# Patient Record
Sex: FEMALE | Race: WHITE | NOT HISPANIC OR LATINO | Employment: OTHER | URBAN - METROPOLITAN AREA
[De-identification: names, ages, dates, MRNs, and addresses within clinical notes are randomized per-mention and may not be internally consistent; named-entity substitution may affect disease eponyms.]

---

## 2023-03-10 ENCOUNTER — LAB VISIT (OUTPATIENT)
Dept: LAB | Facility: HOSPITAL | Age: 78
End: 2023-03-10
Attending: INTERNAL MEDICINE
Payer: MEDICARE

## 2023-03-10 ENCOUNTER — OFFICE VISIT (OUTPATIENT)
Dept: PRIMARY CARE CLINIC | Facility: CLINIC | Age: 78
End: 2023-03-10
Payer: MEDICARE

## 2023-03-10 VITALS
DIASTOLIC BLOOD PRESSURE: 58 MMHG | SYSTOLIC BLOOD PRESSURE: 118 MMHG | HEIGHT: 66 IN | RESPIRATION RATE: 18 BRPM | OXYGEN SATURATION: 100 % | BODY MASS INDEX: 24.1 KG/M2 | HEART RATE: 99 BPM | TEMPERATURE: 98 F | WEIGHT: 149.94 LBS

## 2023-03-10 DIAGNOSIS — I10 HYPERTENSION, UNSPECIFIED TYPE: ICD-10-CM

## 2023-03-10 DIAGNOSIS — E03.9 HYPOTHYROIDISM, UNSPECIFIED TYPE: ICD-10-CM

## 2023-03-10 DIAGNOSIS — D69.2 SENILE PURPURA: Primary | ICD-10-CM

## 2023-03-10 DIAGNOSIS — I47.10 SVT (SUPRAVENTRICULAR TACHYCARDIA): ICD-10-CM

## 2023-03-10 DIAGNOSIS — E78.5 DYSLIPIDEMIA: ICD-10-CM

## 2023-03-10 DIAGNOSIS — L65.9 HAIR LOSS: ICD-10-CM

## 2023-03-10 LAB
ALBUMIN SERPL BCP-MCNC: 3.9 G/DL (ref 3.5–5.2)
ALP SERPL-CCNC: 83 U/L (ref 55–135)
ALT SERPL W/O P-5'-P-CCNC: 18 U/L (ref 10–44)
ANION GAP SERPL CALC-SCNC: 9 MMOL/L (ref 8–16)
AST SERPL-CCNC: 24 U/L (ref 10–40)
BASOPHILS # BLD AUTO: 0.06 K/UL (ref 0–0.2)
BASOPHILS NFR BLD: 0.8 % (ref 0–1.9)
BILIRUB SERPL-MCNC: 0.6 MG/DL (ref 0.1–1)
BUN SERPL-MCNC: 17 MG/DL (ref 8–23)
CALCIUM SERPL-MCNC: 9.6 MG/DL (ref 8.7–10.5)
CHLORIDE SERPL-SCNC: 109 MMOL/L (ref 95–110)
CHOLEST SERPL-MCNC: 148 MG/DL (ref 120–199)
CHOLEST/HDLC SERPL: 3.1 {RATIO} (ref 2–5)
CO2 SERPL-SCNC: 22 MMOL/L (ref 23–29)
CREAT SERPL-MCNC: 0.9 MG/DL (ref 0.5–1.4)
DIFFERENTIAL METHOD: ABNORMAL
EOSINOPHIL # BLD AUTO: 0.3 K/UL (ref 0–0.5)
EOSINOPHIL NFR BLD: 3.8 % (ref 0–8)
ERYTHROCYTE [DISTWIDTH] IN BLOOD BY AUTOMATED COUNT: 14.2 % (ref 11.5–14.5)
EST. GFR  (NO RACE VARIABLE): >60 ML/MIN/1.73 M^2
GLUCOSE SERPL-MCNC: 101 MG/DL (ref 70–110)
HCT VFR BLD AUTO: 43.3 % (ref 37–48.5)
HCV AB SERPL QL IA: NORMAL
HDLC SERPL-MCNC: 48 MG/DL (ref 40–75)
HDLC SERPL: 32.4 % (ref 20–50)
HGB BLD-MCNC: 13.4 G/DL (ref 12–16)
IMM GRANULOCYTES # BLD AUTO: 0.01 K/UL (ref 0–0.04)
IMM GRANULOCYTES NFR BLD AUTO: 0.1 % (ref 0–0.5)
LDLC SERPL CALC-MCNC: 82.4 MG/DL (ref 63–159)
LYMPHOCYTES # BLD AUTO: 2.2 K/UL (ref 1–4.8)
LYMPHOCYTES NFR BLD: 30.7 % (ref 18–48)
MCH RBC QN AUTO: 26 PG (ref 27–31)
MCHC RBC AUTO-ENTMCNC: 30.9 G/DL (ref 32–36)
MCV RBC AUTO: 84 FL (ref 82–98)
MONOCYTES # BLD AUTO: 0.6 K/UL (ref 0.3–1)
MONOCYTES NFR BLD: 8.1 % (ref 4–15)
NEUTROPHILS # BLD AUTO: 4.1 K/UL (ref 1.8–7.7)
NEUTROPHILS NFR BLD: 56.5 % (ref 38–73)
NONHDLC SERPL-MCNC: 100 MG/DL
NRBC BLD-RTO: 0 /100 WBC
PLATELET # BLD AUTO: 227 K/UL (ref 150–450)
PMV BLD AUTO: 12 FL (ref 9.2–12.9)
POTASSIUM SERPL-SCNC: 4.6 MMOL/L (ref 3.5–5.1)
PROT SERPL-MCNC: 6.8 G/DL (ref 6–8.4)
RBC # BLD AUTO: 5.16 M/UL (ref 4–5.4)
SODIUM SERPL-SCNC: 140 MMOL/L (ref 136–145)
T4 FREE SERPL-MCNC: 1.25 NG/DL (ref 0.71–1.51)
TRIGL SERPL-MCNC: 88 MG/DL (ref 30–150)
TSH SERPL DL<=0.005 MIU/L-ACNC: 1.18 UIU/ML (ref 0.4–4)
WBC # BLD AUTO: 7.19 K/UL (ref 3.9–12.7)

## 2023-03-10 PROCEDURE — 80053 COMPREHEN METABOLIC PANEL: CPT | Performed by: INTERNAL MEDICINE

## 2023-03-10 PROCEDURE — 99204 OFFICE O/P NEW MOD 45 MIN: CPT | Mod: S$PBB,,, | Performed by: INTERNAL MEDICINE

## 2023-03-10 PROCEDURE — 36415 COLL VENOUS BLD VENIPUNCTURE: CPT | Mod: PN | Performed by: INTERNAL MEDICINE

## 2023-03-10 PROCEDURE — 99999 PR PBB SHADOW E&M-NEW PATIENT-LVL III: ICD-10-PCS | Mod: PBBFAC,,, | Performed by: INTERNAL MEDICINE

## 2023-03-10 PROCEDURE — 85025 COMPLETE CBC W/AUTO DIFF WBC: CPT | Performed by: INTERNAL MEDICINE

## 2023-03-10 PROCEDURE — 80061 LIPID PANEL: CPT | Performed by: INTERNAL MEDICINE

## 2023-03-10 PROCEDURE — 99203 OFFICE O/P NEW LOW 30 MIN: CPT | Mod: PBBFAC,PN | Performed by: INTERNAL MEDICINE

## 2023-03-10 PROCEDURE — 99204 PR OFFICE/OUTPT VISIT, NEW, LEVL IV, 45-59 MIN: ICD-10-PCS | Mod: S$PBB,,, | Performed by: INTERNAL MEDICINE

## 2023-03-10 PROCEDURE — 84443 ASSAY THYROID STIM HORMONE: CPT | Performed by: INTERNAL MEDICINE

## 2023-03-10 PROCEDURE — 86803 HEPATITIS C AB TEST: CPT | Performed by: INTERNAL MEDICINE

## 2023-03-10 PROCEDURE — 84630 ASSAY OF ZINC: CPT | Performed by: INTERNAL MEDICINE

## 2023-03-10 PROCEDURE — 84439 ASSAY OF FREE THYROXINE: CPT | Performed by: INTERNAL MEDICINE

## 2023-03-10 PROCEDURE — 99999 PR PBB SHADOW E&M-NEW PATIENT-LVL III: CPT | Mod: PBBFAC,,, | Performed by: INTERNAL MEDICINE

## 2023-03-10 RX ORDER — LOSARTAN POTASSIUM 100 MG/1
100 TABLET ORAL EVERY MORNING
Qty: 90 TABLET | Refills: 1 | Status: SHIPPED | OUTPATIENT
Start: 2023-03-10 | End: 2023-09-07

## 2023-03-10 RX ORDER — LOSARTAN POTASSIUM 100 MG/1
100 TABLET ORAL EVERY MORNING
COMMUNITY
Start: 2022-12-20 | End: 2023-03-10 | Stop reason: SDUPTHER

## 2023-03-10 RX ORDER — METOPROLOL TARTRATE 100 MG/1
100 TABLET ORAL 2 TIMES DAILY
COMMUNITY
Start: 2023-01-28 | End: 2023-03-10 | Stop reason: SDUPTHER

## 2023-03-10 RX ORDER — METOPROLOL TARTRATE 100 MG/1
100 TABLET ORAL 2 TIMES DAILY
Qty: 180 TABLET | Refills: 1 | Status: SHIPPED | OUTPATIENT
Start: 2023-03-10 | End: 2023-10-27

## 2023-03-10 RX ORDER — NAPROXEN SODIUM 220 MG/1
81 TABLET, FILM COATED ORAL DAILY
COMMUNITY
End: 2024-01-24

## 2023-03-10 RX ORDER — ROSUVASTATIN CALCIUM 10 MG/1
10 TABLET, COATED ORAL
COMMUNITY
Start: 2022-12-20 | End: 2023-03-10 | Stop reason: SDUPTHER

## 2023-03-10 RX ORDER — LEVOTHYROXINE SODIUM 75 UG/1
75 TABLET ORAL EVERY MORNING
Qty: 90 TABLET | Refills: 1 | Status: SHIPPED | OUTPATIENT
Start: 2023-03-10 | End: 2023-09-29

## 2023-03-10 RX ORDER — LEVOTHYROXINE SODIUM 75 UG/1
75 TABLET ORAL EVERY MORNING
COMMUNITY
Start: 2023-01-07 | End: 2023-03-10 | Stop reason: SDUPTHER

## 2023-03-10 RX ORDER — ROSUVASTATIN CALCIUM 10 MG/1
10 TABLET, COATED ORAL DAILY
Qty: 90 TABLET | Refills: 1 | Status: SHIPPED | OUTPATIENT
Start: 2023-03-10 | End: 2023-09-03

## 2023-03-10 NOTE — PROGRESS NOTES
[facility] Progress Note  3/10/2023          Reason for Visit:      had concerns including Establish Care (Just moved [place] /Establishing care /Would like some refills on some medications/).       History of Present Illness:     Pt is here today to establish care after recently moving her from [facility].    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  Doesn't really want to take oral nsaids.  Discussed possibly trying topical voltaren gel and she was agreeable to try that.      SVT  Previously diagnosed.  On metoprolol 100 bid and has extra 25 mg tablets that she takes if she has episodes, which isn't often.  Has always resolved within just a few minutes with 1 or 2 doses of the 25 mg tablet.      Skin lesion face  There is a slightly scaly triangular fleshy colored area on the right cheek just below right eye.  It isn't painful or itchy.  Doesn't really look concerning for a skin cancer.  Suggested trying topical emmollient/moisturizer, considering derm follow up if not improving.    Hair loss  PT has had hair loss.    Has family history of hair loss in females.  Mom had to wear a wig  Pt does have hypothyroidism, thinks its been well controlled on current dose of meds  Agreeable to check labs including zinc, cbc today    Hypothyroidism  Diagnosed on routine labs.  Has been on stable dose for a while  Agreeable to recheck labs today    Senile purpura  Has scattered bruising on both forearms.    No significant bleeding.  Reassured about the senile purupura    HM  Last booster 6/22/22-discussed bivalent covid vaccine.  Has had pneumococcal vaccination previously - will request immunization records from [employer].  Has had shingles vaccine in past  Pt declined MMG/Dexa - doesn't want screening tests anymore    Problem List:     Patient Active Problem List   Diagnosis    Hypothyroidism    HTN  "(hypertension)    Dyslipidemia    SVT (supraventricular tachycardia)         Medications:       Current Outpatient Medications:     aspirin 81 MG Chew, Take 81 mg by mouth once daily., Disp: , Rfl:     levothyroxine (SYNTHROID) 75 MCG tablet, Take 1 tablet (75 mcg total) by mouth every morning., Disp: 90 tablet, Rfl: 1    losartan (COZAAR) 100 MG tablet, Take 1 tablet (100 mg total) by mouth every morning., Disp: 90 tablet, Rfl: 1    metoprolol tartrate (LOPRESSOR) 100 MG tablet, Take 1 tablet (100 mg total) by mouth 2 (two) times daily., Disp: 180 tablet, Rfl: 1    rosuvastatin (CRESTOR) 10 MG tablet, Take 1 tablet (10 mg total) by mouth once daily., Disp: 90 tablet, Rfl: 1        Review of Systems:     Review of Systems   Constitutional:  Negative for chills and fever.   HENT:  Negative for ear pain and sore throat.    Respiratory:  Negative for cough, shortness of breath and wheezing.    Cardiovascular:  Negative for chest pain and palpitations.   Gastrointestinal:  Negative for constipation, diarrhea, nausea and vomiting.   Genitourinary:  Negative for dysuria and hematuria.   Musculoskeletal:  Negative for joint swelling and joint deformity.   Neurological:  Negative for dizziness and weakness.         Physical Exam:     Temp:    97.9 °F (36.6 °C) (Oral)        Blood Pressure:  (!) 118/58        Pulse:   99     Respirations:  18  Weight:   68 kg (149 lb 14.6 oz)  Height:   5' 6" (1.676 m)  BMI:     Body mass index is 24.2 kg/m².    Physical Exam  Constitutional:       General: She is not in acute distress.  HENT:      Right Ear: Tympanic membrane normal.      Left Ear: Tympanic membrane normal.      Nose: No congestion or rhinorrhea.      Mouth/Throat:      Pharynx: No oropharyngeal exudate or posterior oropharyngeal erythema.   Cardiovascular:      Rate and Rhythm: Normal rate and regular rhythm.   Pulmonary:      Effort: Pulmonary effort is normal. No respiratory distress.      Breath sounds: No wheezing. "   Abdominal:      Palpations: Abdomen is soft.      Tenderness: There is no abdominal tenderness.   Skin:     General: Skin is warm.   Neurological:      General: No focal deficit present.      Mental Status: She is alert and oriented to person, place, and time.         Labs/Imaging/Testing:     Most recent CBC:  Lab Results   Component Value Date    WBC 7.19 03/10/2023    HGB 13.4 03/10/2023     03/10/2023    MCV 84 03/10/2023       Most recent Lipids:  Lab Results   Component Value Date    CHOL 148 03/10/2023    HDL 48 03/10/2023    LDLCALC 82.4 03/10/2023    TRIG 88 03/10/2023       Most recent Chemistry:  Lab Results   Component Value Date     03/10/2023    K 4.6 03/10/2023     03/10/2023    CO2 22 (L) 03/10/2023    BUN 17 03/10/2023    CREATININE 0.9 03/10/2023     03/10/2023    CALCIUM 9.6 03/10/2023    ALT 18 03/10/2023    AST 24 03/10/2023    ALKPHOS 83 03/10/2023    PROT 6.8 03/10/2023    ALBUMIN 3.9 03/10/2023       Other tests:  Lab Results   Component Value Date    TSH 1.183 03/10/2023    FREET4 1.25 03/10/2023             Assessment and Plan:     1. SVT (supraventricular tachycardia)  Continue metoprolol, stable    2. Hypothyroidism, unspecified type  Recheck labs including thyroid fucntions, continue levothyroxine 75 mcg      3. Hypertension, unspecified type  Cotninue losartan 100 and metoprolol 100 bid  Stable    4. Dyslipidemia  Recheck FLP    5. Senile purpura  Reassured today    6. Hair loss  - CBC Auto Differential; Future  - Comprehensive Metabolic Panel; Future  - Lipid Panel; Future  - TSH; Future  - Hepatitis C Antibody; Future  - ZINC; Future  - T4, Free; Future     RTC 6 mos or sooner honorio Hernandez MD  3/10/2023    This note was prepared using voice-recognition software.  Although efforts are made to proofread the note, some errors may persist in the final document.

## 2023-03-13 ENCOUNTER — TELEPHONE (OUTPATIENT)
Dept: PRIMARY CARE CLINIC | Facility: CLINIC | Age: 78
End: 2023-03-13
Payer: MEDICARE

## 2023-03-13 NOTE — TELEPHONE ENCOUNTER
----- Message from Simon Hernandez MD sent at 3/13/2023 12:56 PM CDT -----  Please advise - the labs looked ok - no new recommendations at this time (the zinc test is still pending).

## 2023-03-14 LAB — ZINC SERPL-MCNC: 61 UG/DL (ref 60–130)

## 2023-09-02 NOTE — TELEPHONE ENCOUNTER
No care due was identified.  Health [facility] Embedded Care Due Messages. Reference number: [id number].   9/02/2023 7:46:28 AM CDT

## 2023-09-03 RX ORDER — ROSUVASTATIN CALCIUM 10 MG/1
10 TABLET, COATED ORAL
Qty: 90 TABLET | Refills: 1 | Status: SHIPPED | OUTPATIENT
Start: 2023-09-03 | End: 2023-12-05 | Stop reason: SDUPTHER

## 2023-09-03 NOTE — TELEPHONE ENCOUNTER
Refill Authorization Note     Refill Decision Note   Anette Willis  is requesting a refill authorization.  Brief Assessment and Rationale for Refill:  Approve     Medication Therapy Plan:       Medication Reconciliation Completed: No   Comments:     No Care Gaps recommended.     Note composed:10:55 AM 09/03/2023

## 2023-09-07 RX ORDER — LOSARTAN POTASSIUM 100 MG/1
100 TABLET ORAL
Qty: 90 TABLET | Refills: 1 | Status: SHIPPED | OUTPATIENT
Start: 2023-09-07 | End: 2023-12-05 | Stop reason: SDUPTHER

## 2023-09-07 NOTE — TELEPHONE ENCOUNTER
Refill Decision Note   Anette Lazaro  is requesting a refill authorization.  Brief Assessment and Rationale for Refill:  Approve     Medication Therapy Plan:         Comments:     Note composed:12:53 PM 09/07/2023

## 2023-09-07 NOTE — TELEPHONE ENCOUNTER
No care due was identified.  Health [facility] Embedded Care Due Messages. Reference number: [id number].   9/07/2023 12:41:41 AM CDT

## 2023-09-19 RX ORDER — METOPROLOL TARTRATE 25 MG/1
25 TABLET, FILM COATED ORAL 2 TIMES DAILY
Qty: 180 TABLET | Refills: 3 | OUTPATIENT
Start: 2023-09-19

## 2023-09-19 NOTE — TELEPHONE ENCOUNTER
No care due was identified.  Health [facility] Embedded Care Due Messages. Reference number: [id number].   9/19/2023 12:47:50 AM CDT

## 2023-09-19 NOTE — TELEPHONE ENCOUNTER
Refill Decision Note   Anette Willis  is requesting a refill authorization.  Brief Assessment and Rationale for Refill:  Quick Discontinue     Medication Therapy Plan:  PATIENT WAS INCREASED TO 100MG ON 01/28/23    Medication Reconciliation Completed: No   Comments:     No Care Gaps recommended.     Note composed:12:23 PM 09/19/2023

## 2023-09-20 DIAGNOSIS — Z78.0 MENOPAUSE: ICD-10-CM

## 2023-09-29 RX ORDER — LEVOTHYROXINE SODIUM 75 UG/1
75 TABLET ORAL EVERY MORNING
Qty: 90 TABLET | Refills: 1 | Status: SHIPPED | OUTPATIENT
Start: 2023-09-29 | End: 2023-12-05 | Stop reason: SDUPTHER

## 2023-09-29 NOTE — TELEPHONE ENCOUNTER
No care due was identified.  Health [facility] Embedded Care Due Messages. Reference number: [id number].   9/29/2023 12:41:50 AM CDT

## 2023-09-29 NOTE — TELEPHONE ENCOUNTER
Refill Decision Note   Anette Willis  is requesting a refill authorization.  Brief Assessment and Rationale for Refill:  Approve     Medication Therapy Plan:       Medication Reconciliation Completed: No    Comments:     No Care Gaps recommended.     Note composed:12:11 PM 09/29/2023

## 2023-10-27 RX ORDER — METOPROLOL TARTRATE 100 MG/1
100 TABLET ORAL 2 TIMES DAILY
Qty: 180 TABLET | Refills: 1 | Status: SHIPPED | OUTPATIENT
Start: 2023-10-27 | End: 2023-12-05 | Stop reason: SDUPTHER

## 2023-10-27 NOTE — TELEPHONE ENCOUNTER
No care due was identified.  Health [facility] Embedded Care Due Messages. Reference number: [id number].   10/27/2023 1:39:19 AM CDT

## 2023-10-27 NOTE — TELEPHONE ENCOUNTER
Refill Decision Note   Anette Willis  is requesting a refill authorization.  Brief Assessment and Rationale for Refill:  Approve     Medication Therapy Plan:       Medication Reconciliation Completed: No   Comments:     No Care Gaps recommended.     Note composed:10:04 AM 10/27/2023

## 2023-10-31 ENCOUNTER — OFFICE VISIT (OUTPATIENT)
Dept: DERMATOLOGY | Facility: CLINIC | Age: 78
End: 2023-10-31
Payer: MEDICARE

## 2023-10-31 VITALS — WEIGHT: 149 LBS | BODY MASS INDEX: 24.05 KG/M2

## 2023-10-31 DIAGNOSIS — D18.01 CHERRY ANGIOMA: ICD-10-CM

## 2023-10-31 DIAGNOSIS — L82.1 SEBORRHEIC KERATOSES: Primary | ICD-10-CM

## 2023-10-31 DIAGNOSIS — L81.1 MELASMA: ICD-10-CM

## 2023-10-31 DIAGNOSIS — Z12.83 SKIN EXAM, SCREENING FOR CANCER: ICD-10-CM

## 2023-10-31 DIAGNOSIS — L81.4 LENTIGINES: ICD-10-CM

## 2023-10-31 PROCEDURE — 99203 PR OFFICE/OUTPT VISIT, NEW, LEVL III, 30-44 MIN: ICD-10-PCS | Mod: S$PBB,,, | Performed by: DERMATOLOGY

## 2023-10-31 PROCEDURE — 99999 PR PBB SHADOW E&M-EST. PATIENT-LVL III: CPT | Mod: PBBFAC,,, | Performed by: DERMATOLOGY

## 2023-10-31 PROCEDURE — 99203 OFFICE O/P NEW LOW 30 MIN: CPT | Mod: S$PBB,,, | Performed by: DERMATOLOGY

## 2023-10-31 PROCEDURE — 99213 OFFICE O/P EST LOW 20 MIN: CPT | Mod: PBBFAC,PO | Performed by: DERMATOLOGY

## 2023-10-31 PROCEDURE — 99999 PR PBB SHADOW E&M-EST. PATIENT-LVL III: ICD-10-PCS | Mod: PBBFAC,,, | Performed by: DERMATOLOGY

## 2023-10-31 NOTE — PROGRESS NOTES
Subjective:      Patient ID:  Anette Willis is a 78 y.o. female who presents for   Chief Complaint   Patient presents with    Skin Check     Recently moved here from [place] no hx of skin cancers, would like skin check.  Has problems with dry skin on finger tips.       Review of Systems   Constitutional:  Negative for fever, chills, weight loss, weight gain, fatigue, night sweats and malaise.   Skin:  Positive for daily sunscreen use and wears hat. Negative for activity-related sunscreen use.   Hematologic/Lymphatic: Does not bruise/bleed easily.       Objective:   Physical Exam   Constitutional: She appears well-developed and well-nourished. No distress.   Neurological: She is alert and oriented to person, place, and time. She is not disoriented.   Psychiatric: She has a normal mood and affect.   Skin:   Areas Examined (abnormalities noted in diagram):   Head / Face Inspection Performed  Neck Inspection Performed  Chest / Axilla Inspection Performed  Abdomen Inspection Performed  Back Inspection Performed  RUE Inspected  LUE Inspection Performed  RLE Inspected  LLE Inspection Performed  Nails and Digits Inspection Performed                     Diagram Legend     Erythematous scaling macule/papule c/w actinic keratosis       Vascular papule c/w angioma      Pigmented verrucoid papule/plaque c/w seborrheic keratosis      Yellow umbilicated papule c/w sebaceous hyperplasia      Irregularly shaped tan macule c/w lentigo     1-2 mm smooth white papules consistent with Milia      Movable subcutaneous cyst with punctum c/w epidermal inclusion cyst      Subcutaneous movable cyst c/w pilar cyst      Firm pink to brown papule c/w dermatofibroma      Pedunculated fleshy papule(s) c/w skin tag(s)      Evenly pigmented macule c/w junctional nevus     Mildly variegated pigmented, slightly irregular-bordered macule c/w mildly atypical nevus      Flesh colored to evenly pigmented papule c/w intradermal nevus       Pink pearly  "papule/plaque c/w basal cell carcinoma      Erythematous hyperkeratotic cursted plaque c/w SCC      Surgical scar with no sign of skin cancer recurrence      Open and closed comedones      Inflammatory papules and pustules      Verrucoid papule consistent consistent with wart     Erythematous eczematous patches and plaques     Dystrophic onycholytic nail with subungual debris c/w onychomycosis     Umbilicated papule    Erythematous-base heme-crusted tan verrucoid plaque consistent with inflamed seborrheic keratosis     Erythematous Silvery Scaling Plaque c/w Psoriasis     See annotation      Assessment / Plan:        Seborrheic keratoses  Seborrheic keratosis scattered, told benign no treatment needed.  Brochure provided.  Can try cerave renewing cream for lesions on face      Cherry angiomas  This is a benign vascular lesion. Reassurance given. No treatment required.       Lentigines  The "ABCD" rules to observe pigmented lesions were reviewed.      Melasma  sunscreen    Skin exam, screening for cancer   No lesions suspicious for malignancy noted.    Recommend daily sun protection/avoidance and use of at least SPF 30, broad spectrum sunscreen (OTC drug).     Dermatitis fingers  Aveeno moisturizer after hand washing           Follow up in about 1 year (around 10/31/2024).  "

## 2023-11-14 ENCOUNTER — OFFICE VISIT (OUTPATIENT)
Dept: INTERNAL MEDICINE | Facility: CLINIC | Age: 78
End: 2023-11-14
Payer: MEDICARE

## 2023-11-14 DIAGNOSIS — M79.605 LEFT LEG PAIN: Primary | ICD-10-CM

## 2023-11-14 PROCEDURE — 99499 NO LOS: ICD-10-PCS | Mod: 95,,, | Performed by: PHYSICIAN ASSISTANT

## 2023-11-14 PROCEDURE — 99499 UNLISTED E&M SERVICE: CPT | Mod: 95,,, | Performed by: PHYSICIAN ASSISTANT

## 2023-11-14 NOTE — PROGRESS NOTES
Pt re-directed to UC - c/o not appropriate for audio call only. -ACM   Answers submitted by the patient for this visit:  Back Pain Questionnaire (Submitted on 11/14/2023)  Chief Complaint: Back pain  Chronicity: new  Onset: in the past 7 days  Frequency: daily  Progression since onset: waxing and waning  Pain location: sacro-iliac  Pain quality: stabbing  Radiates to: right foot, right knee, right thigh  Pain - numeric: 8/10  Pain is: worse during the day  Aggravated by: position, standing  Stiffness is present: all day  leg pain: Yes  weakness: Yes  Risk factors: lack of exercise  Pain severity: moderate  Improvement on treatment: mild

## 2023-11-15 ENCOUNTER — OFFICE VISIT (OUTPATIENT)
Dept: URGENT CARE | Facility: CLINIC | Age: 78
End: 2023-11-15
Payer: MEDICARE

## 2023-11-15 VITALS
RESPIRATION RATE: 16 BRPM | WEIGHT: 149.06 LBS | SYSTOLIC BLOOD PRESSURE: 163 MMHG | TEMPERATURE: 98 F | OXYGEN SATURATION: 98 % | HEART RATE: 77 BPM | HEIGHT: 66 IN | DIASTOLIC BLOOD PRESSURE: 84 MMHG | BODY MASS INDEX: 23.95 KG/M2

## 2023-11-15 DIAGNOSIS — M54.41 ACUTE RIGHT-SIDED LOW BACK PAIN WITH RIGHT-SIDED SCIATICA: Primary | ICD-10-CM

## 2023-11-15 DIAGNOSIS — M62.838 MUSCLE SPASM: ICD-10-CM

## 2023-11-15 PROCEDURE — 99214 PR OFFICE/OUTPT VISIT, EST, LEVL IV, 30-39 MIN: ICD-10-PCS | Mod: S$GLB,,, | Performed by: PHYSICIAN ASSISTANT

## 2023-11-15 PROCEDURE — 99214 OFFICE O/P EST MOD 30 MIN: CPT | Mod: S$GLB,,, | Performed by: PHYSICIAN ASSISTANT

## 2023-11-15 RX ORDER — METOPROLOL TARTRATE 25 MG/1
25 TABLET, FILM COATED ORAL 2 TIMES DAILY
COMMUNITY
Start: 2023-06-16 | End: 2023-12-05 | Stop reason: SDUPTHER

## 2023-11-15 RX ORDER — MELOXICAM 7.5 MG/1
7.5 TABLET ORAL DAILY PRN
Qty: 30 TABLET | Refills: 0 | Status: SHIPPED | OUTPATIENT
Start: 2023-11-15 | End: 2023-11-15 | Stop reason: SDUPTHER

## 2023-11-15 RX ORDER — METHOCARBAMOL 500 MG/1
500 TABLET, FILM COATED ORAL EVERY 12 HOURS PRN
Qty: 30 TABLET | Refills: 0 | Status: SHIPPED | OUTPATIENT
Start: 2023-11-15 | End: 2023-11-25

## 2023-11-15 RX ORDER — GABAPENTIN 300 MG/1
CAPSULE ORAL
Qty: 59 CAPSULE | Refills: 0 | Status: SHIPPED | OUTPATIENT
Start: 2023-11-15 | End: 2023-12-05

## 2023-11-15 RX ORDER — MELOXICAM 7.5 MG/1
7.5 TABLET ORAL DAILY PRN
Qty: 30 TABLET | Refills: 0 | Status: SHIPPED | OUTPATIENT
Start: 2023-11-15 | End: 2023-12-05

## 2023-11-15 RX ORDER — GABAPENTIN 300 MG/1
CAPSULE ORAL
Qty: 59 CAPSULE | Refills: 0 | Status: SHIPPED | OUTPATIENT
Start: 2023-11-15 | End: 2023-11-15 | Stop reason: SDUPTHER

## 2023-11-15 RX ORDER — METHOCARBAMOL 500 MG/1
500 TABLET, FILM COATED ORAL EVERY 12 HOURS PRN
Qty: 30 TABLET | Refills: 0 | Status: SHIPPED | OUTPATIENT
Start: 2023-11-15 | End: 2023-11-15 | Stop reason: SDUPTHER

## 2023-11-15 NOTE — PATIENT INSTRUCTIONS
PLEASE READ YOUR DISCHARGE INSTRUCTIONS ENTIRELY AS IT CONTAINS IMPORTANT INFORMATION.  - OTC Tylenol/anti-inflammatory as needed for pain (see below). These medications can be obtained over the counter at any local pharmacy without requiring a prescription.   OTC ORAL medications for pain reliever or fever reducing:  - Acetaminophen (tylenol 650mg every 8 hour as needed with food) or Ibuprofen (advil,motrin 400-600mg every 8 hour with food as needed) as directed as needed for fever/pain. Avoid tylenol if you have a history of liver disease or allergic reactions. Do not take ibuprofen if you have a history of allergic reactions, stomach bleeding ulcers, kidney disease, or if you take blood thinners.  -The patient was advised that NSAID-type medications have two very important potential side effects: gastrointestinal irritation including hemorrhage and renal injuries. patient was asked to take the medication with food and to stop if patient experiences any GI upset. I asked patient to call for vomiting, abdominal pain or black/bloody stools. The patient expresses understanding of these issues and all questions were answered.  OTC TOPICAL meds for pain reliever :  -You can apply OTC diclofenac or Volatren Gel 2-3 times daily to muscle or joints.  -You can also apply OTC lidocaine 4-5% with OR without menthol ointment 2-3 daily to muscle or joints.  -Please let one absorb before using the other. Do not use both at the same time as it may decrease efficacy. Please stop using if you develop any skin rash or irritation.  -Please wash your hands after application of these topical products.     - do not use OTC anti-inflammatory if taking prescribed (Rx--meloxicam) anti-inflammatory. Start Rx inflammatory in 6-12 hours from clinic visit because you were given a concentrated anti-inflammatory injection in clinic.  - no operating machinery with gabapentin or muscle relaxant as it may cause drowsiness.  Take 300mg (1cap) every  evening x3days. Then, increase to 300mg (1cap) twice daily x3days. May titrate up to 300mg three times daily as tolerated. Hold/stop taking if you develop any daytime drowsiness, vision changes, or leg swelling.  - continue heat (muscle) /ice (bone/joint) compression, rice therapy, and muscle stretches.   - Radiographs and all diagnostic testing reviewed with patient  - if no improvement or worsening symptoms, recommend follow-up with *orthopedics or PCP for further evaluation.  Please call the number below to set up appointment; if a referral has been placed.  - Follow up with your PCP or specialty clinic as directed.  You can call [phone] or [phone] to schedule an appointment with the appropriate provider.    - If you smoke, please stop smoking.  - discussed weight loss given obesity  -You must understand that you've received an Urgent Care treatment only and that you may be released before all your medical problems are known or treated. You, the patient, will arrange for follow up care as instructed. Please arrange follow up with your primary medical clinic within 2-5 days if your signs and symptoms have not resolved or worsen.   - If your condition worsens or fails to improve we recommend that you receive another evaluation at the emergency room immediately or contact your primary medical clinic to discuss your concerns in next 2-5 days.  Strict ER versus clinic precautions given.      RED FLAGS/WARNING SYMPTOMS DISCUSSED WITH PATIENT THAT WOULD WARRANT EMERGENT MEDICAL ATTENTION. Patient aware and verbalized understanding.      RICE     Rest an injury, elevate it, and use ice and compression as directed.   RICE stands for rest, ice, compression, and elevation. These can limit pain and swelling after an injury. RICE may be recommended to help treat fractures, sprains, strains, and bruises or bumps.   Home care  The following explain the details of RICE:  Rest. Limit the use of the injured body part.  This helps prevent further damage to the body part and gives it time to heal. In some cases, you may need a sling, brace, splint, or cast to help keep the body part still until it has healed.  Ice. Applying ice right after an injury helps relieve pain and swelling. Wrap a bag of ice in a thin towel. Then, place it over the injured area. Do this for 10 to 15 minutes every 3 to 4 hours. Continue for the next 1 to 3 days or until your symptoms improve. Never put ice directly on your skin or ice an area longer than 15 minutes at a time.  Compression. Putting pressure on an injury helps reduce swelling and provides support. Wrap the injured area firmly with an elastic bandage/wrap. Make sure not to wrap the bandage too tightly or you will cut off blood flow to the injured area. If your bandage loosens, rewrap it.  Elevation. Keeping an injury raised above the level of your heart reduces swelling, pain, and throbbing. For instance, if you have a broken leg, it may help to rest your leg on several pillows when sitting or lying down. Try to keep the injured area elevated for at least 2 to 3 hours per day.  Follow-up care  Follow up with your healthcare provider, or as advised.  When to seek medical advice  Call your healthcare provider right away if any of these occur:  Fever of 100.4°F (38°C) or higher, or as directed by your healthcare provider  Increased pain or swelling in the injured body part  Injured body part becomes cold, blue, numb, or tingly  Signs of infection. These include warmth in the skin, redness, drainage, or bad smell coming from the injured body part.  Date Last Reviewed: 1/18/2016  © 9814-1645 [employer]. [address] [place] [address], [place] [address]. All rights reserved. This information is not intended as a substitute for professional medical care. Always follow your healthcare professional's instructions.

## 2023-11-15 NOTE — PROGRESS NOTES
"Subjective:      Patient ID: Anette Willis is a 78 y.o. female.    Vitals:  height is 5' 6" (1.676 m) and weight is 67.6 kg (149 lb 0.5 oz). Her oral temperature is 98.3 °F (36.8 °C). Her blood pressure is 163/84 (abnormal) and her pulse is 77. Her respiration is 16 and oxygen saturation is 98%.     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  Complains symptoms started 1 week ago after she was stretching.  Complaining new right buttocks pain radiating to right posterior lateral leg until ankle.  Pain worsens at night when she is lying down or increased physical activity.  Pain improves with bending over while walking.  Has tried OTC Tylenol, leave, icy Hot, ice pack, and warm compresses with no significant relief.  Has associated muscle spasms and legs at night.  Using support cane since pain started as she feels her right leg is weak when pain is severe.  No other associated symptoms.  No fever, rash, recent hospitalization, surgery, immobilization, malignancy, urinary symptoms, bladder bowel incontinence, or saddle anesthesia.    Leg Pain   The incident occurred more than 1 week ago. The incident occurred at home. Injury mechanism: possible stretching. The pain is present in the right foot, right ankle, right thigh, right hip, right knee and right leg. The quality of the pain is described as aching, cramping and burning. The pain is at a severity of 6/10. The pain is moderate. The pain has been Intermittent since onset. Associated symptoms include muscle weakness. Pertinent negatives include no inability to bear weight, loss of motion, loss of sensation, numbness or tingling. She reports no foreign bodies present. Nothing aggravates the symptoms. She has tried acetaminophen, heat, ice, NSAIDs, immobilization and rest (Aleve) for the " symptoms. The treatment provided mild relief.       Constitution: Negative for activity change, appetite change, chills, sweating, fatigue, fever and generalized weakness.   HENT:  Negative for ear pain, hearing loss, facial swelling, congestion, postnasal drip, sinus pain, sinus pressure, sore throat, trouble swallowing and voice change.    Neck: Negative for neck pain, neck stiffness and painful lymph nodes.   Cardiovascular:  Negative for chest pain, leg swelling, palpitations, sob on exertion and passing out.   Eyes:  Negative for eye discharge, eye pain, photophobia, vision loss, double vision and blurred vision.   Respiratory:  Negative for chest tightness, cough, sputum production, bloody sputum, COPD, shortness of breath, stridor, wheezing and asthma.    Gastrointestinal:  Negative for abdominal pain, nausea, vomiting, constipation, diarrhea, bright red blood in stool, rectal bleeding, heartburn and bowel incontinence.   Genitourinary:  Negative for dysuria, frequency, urgency, urine decreased, flank pain, bladder incontinence and hematuria.   Musculoskeletal:  Positive for back pain and muscle ache. Negative for trauma, joint pain, joint swelling, abnormal ROM of joint, muscle cramps and history of spine disorder.   Skin:  Negative for color change, pale, rash and wound.   Allergic/Immunologic: Negative for seasonal allergies, asthma and immunocompromised state.   Neurological:  Negative for dizziness, history of vertigo, light-headedness, passing out, facial drooping, speech difficulty, coordination disturbances, loss of balance, headaches, disorientation, altered mental status, loss of consciousness, numbness, tingling and seizures.   Hematologic/Lymphatic: Negative for swollen lymph nodes, easy bruising/bleeding and trouble clotting. Does not bruise/bleed easily.   Psychiatric/Behavioral:  Negative for altered mental status and disorientation.       Objective:     Physical Exam   Constitutional: She  appears well-developed. She is cooperative.  Non-toxic appearance. She does not appear ill. No distress.   HENT:   Head: Normocephalic and atraumatic.   Ears:   Right Ear: Hearing, external ear and ear canal normal.   Left Ear: Hearing, external ear and ear canal normal.   Nose: Nose normal.   Mouth/Throat: Oropharynx is clear and moist. Mucous membranes are moist. Oropharynx is clear.   Eyes: Conjunctivae, EOM and lids are normal. Right eye exhibits no discharge. Left eye exhibits no discharge. vision grossly intact gaze aligned appropriately   Neck: Neck supple. No neck rigidity present.   Cardiovascular: Normal rate, regular rhythm and normal pulses.      Comments: No leg edema, calf tenderness/erythema, or Homans sign bilaterally.       Pulmonary/Chest: Effort normal. No accessory muscle usage. No respiratory distress. She has no wheezes. She exhibits no tenderness.   Abdominal: Normal appearance. She exhibits no distension. There is no abdominal tenderness. There is no rebound, no guarding, no left CVA tenderness and no right CVA tenderness.   Musculoskeletal: Normal range of motion.         General: No tenderness. Normal range of motion.      Right lower leg: No edema.      Left lower leg: No edema.      Comments: Spinal exam:   Moves all extremities with good strength 5/5  BUE- deltoid, triceps, biceps, wrist ext/flexion, hand , and interossei  BLE- hip flexion, knee ext/flexion, dorsiflexion, plantar flexion, EHL, ankle inversion, and ankle eversion      Standing and sitting posture normal.  Gait antalgic.      Negative straight leg raise   Negative clonus   Negative Pam's bilaterally.    Sensation intact to light touch throughout.  2+ DTR bilaterally.    Full back ROM; no pain with all ROM  Full neck ROM; no pain with all ROM.    Negative Lhermitte's or Spurling's    There is no tenderness to palpation of midline spine.  There is no bony step-off, crepitus, or bony tenderness to palpation.       There is muscle spasms present.     No tenderness to palpation of bilateral SI joint   No pain on hip ROM. No TTP trochanteric bursa.  Enoc's test negative         Neurological: no focal deficit. She is alert and at baseline. She has normal motor skills and normal sensation. She displays no weakness, facial symmetry and normal reflexes. No cranial nerve deficit or sensory deficit. She exhibits normal muscle tone. Gait and coordination normal. Coordination normal.   Skin: Skin is warm, dry, not diaphoretic and no rash. Capillary refill takes less than 2 seconds.        Psychiatric: Her behavior is normal. Mood and thought content normal.   Nursing note and vitals reviewed.      Assessment:     1. Acute right-sided low back pain with right-sided sciatica    2. Muscle spasm      Note dictated with voice recognition software, please excuse any grammatical errors.    Patient presents with clinical exam findings and history consistent with above.  We discussed the differential diagnosis.    On exam, patient is nontoxic appearing and vitals are stable.  Patient is essentially neurovascularly intact on exam.      Diagnostic testing results were independently reviewed and interpreted, which were discussed in depth with patient.   Test ordered in clinic:  None  Additionally, previous progress notes/admissions/lab were reviewed and interpreted.  CMP 03/10/2023 with Good kidney function and EGFR.  PCP progress note 03/10/2023 reviewed.      Plan:     Offered outpatient referral for physical therapy but patient declined.  Patient was prescribed meds and recommended OTC treatments for their symptoms. Patient was treated conservatively with activity modification, OTC pain reliever, muscle stretches, and Warm vs. RICE therapy.   Patient was referred to orthopedics/back and spine clinic for evaluation or if they fail conservative treatment.   If symptoms do not improve/worsens, patient was referred back to PCP for continued  outpatient workup and management.       Acute right-sided low back pain with right-sided sciatica  -     Ambulatory referral/consult to Back & Spine Clinic  -     Discontinue: meloxicam (MOBIC) 7.5 MG tablet; Take 1 tablet (7.5 mg total) by mouth daily as needed for Pain (Take with food in AM).  Dispense: 30 tablet; Refill: 0  -     Discontinue: methocarbamoL (ROBAXIN) 500 MG Tab; Take 1 tablet (500 mg total) by mouth every 12 (twelve) hours as needed (Muscle spasm).  Dispense: 30 tablet; Refill: 0  -     Discontinue: gabapentin (NEURONTIN) 300 MG capsule; Take 1 capsule (300 mg total) by mouth every evening for 3 days, THEN 1 capsule (300 mg total) every 12 (twelve) hours.  Dispense: 59 capsule; Refill: 0  -     gabapentin (NEURONTIN) 300 MG capsule; Take 1 capsule (300 mg total) by mouth every evening for 3 days, THEN 1 capsule (300 mg total) every 12 (twelve) hours.  Dispense: 59 capsule; Refill: 0  -     meloxicam (MOBIC) 7.5 MG tablet; Take 1 tablet (7.5 mg total) by mouth daily as needed for Pain (Take with food in AM).  Dispense: 30 tablet; Refill: 0  -     methocarbamoL (ROBAXIN) 500 MG Tab; Take 1 tablet (500 mg total) by mouth every 12 (twelve) hours as needed (Muscle spasm).  Dispense: 30 tablet; Refill: 0    Muscle spasm  -     Ambulatory referral/consult to Back & Spine Clinic  -     Discontinue: meloxicam (MOBIC) 7.5 MG tablet; Take 1 tablet (7.5 mg total) by mouth daily as needed for Pain (Take with food in AM).  Dispense: 30 tablet; Refill: 0  -     Discontinue: methocarbamoL (ROBAXIN) 500 MG Tab; Take 1 tablet (500 mg total) by mouth every 12 (twelve) hours as needed (Muscle spasm).  Dispense: 30 tablet; Refill: 0  -     Discontinue: gabapentin (NEURONTIN) 300 MG capsule; Take 1 capsule (300 mg total) by mouth every evening for 3 days, THEN 1 capsule (300 mg total) every 12 (twelve) hours.  Dispense: 59 capsule; Refill: 0  -     gabapentin (NEURONTIN) 300 MG capsule; Take 1 capsule (300 mg total) by  mouth every evening for 3 days, THEN 1 capsule (300 mg total) every 12 (twelve) hours.  Dispense: 59 capsule; Refill: 0  -     meloxicam (MOBIC) 7.5 MG tablet; Take 1 tablet (7.5 mg total) by mouth daily as needed for Pain (Take with food in AM).  Dispense: 30 tablet; Refill: 0  -     methocarbamoL (ROBAXIN) 500 MG Tab; Take 1 tablet (500 mg total) by mouth every 12 (twelve) hours as needed (Muscle spasm).  Dispense: 30 tablet; Refill: 0             Additional MDM:     Heart Failure Score:   COPD = No          Patient Instructions   PLEASE READ YOUR DISCHARGE INSTRUCTIONS ENTIRELY AS IT CONTAINS IMPORTANT INFORMATION.  - OTC Tylenol/anti-inflammatory as needed for pain (see below). These medications can be obtained over the counter at any local pharmacy without requiring a prescription.   OTC ORAL medications for pain reliever or fever reducing:  - Acetaminophen (tylenol 650mg every 8 hour as needed with food) or Ibuprofen (advil,motrin 400-600mg every 8 hour with food as needed) as directed as needed for fever/pain. Avoid tylenol if you have a history of liver disease or allergic reactions. Do not take ibuprofen if you have a history of allergic reactions, stomach bleeding ulcers, kidney disease, or if you take blood thinners.  -The patient was advised that NSAID-type medications have two very important potential side effects: gastrointestinal irritation including hemorrhage and renal injuries. patient was asked to take the medication with food and to stop if patient experiences any GI upset. I asked patient to call for vomiting, abdominal pain or black/bloody stools. The patient expresses understanding of these issues and all questions were answered.  OTC TOPICAL meds for pain reliever :  -You can apply OTC diclofenac or Volatren Gel 2-3 times daily to muscle or joints.  -You can also apply OTC lidocaine 4-5% with OR without menthol ointment 2-3 daily to muscle or joints.  -Please let one absorb before using the  other. Do not use both at the same time as it may decrease efficacy. Please stop using if you develop any skin rash or irritation.  -Please wash your hands after application of these topical products.     - do not use OTC anti-inflammatory if taking prescribed (Rx--meloxicam) anti-inflammatory. Start Rx inflammatory in 6-12 hours from clinic visit because you were given a concentrated anti-inflammatory injection in clinic.  - no operating machinery with gabapentin or muscle relaxant as it may cause drowsiness.  Take 300mg (1cap) every evening x3days. Then, increase to 300mg (1cap) twice daily x3days. May titrate up to 300mg three times daily as tolerated. Hold/stop taking if you develop any daytime drowsiness, vision changes, or leg swelling.  - continue heat (muscle) /ice (bone/joint) compression, rice therapy, and muscle stretches.   - Radiographs and all diagnostic testing reviewed with patient  - if no improvement or worsening symptoms, recommend follow-up with *orthopedics or PCP for further evaluation.  Please call the number below to set up appointment; if a referral has been placed.  - Follow up with your PCP or specialty clinic as directed.  You can call [phone] or [phone] to schedule an appointment with the appropriate provider.    - If you smoke, please stop smoking.  - discussed weight loss given obesity  -You must understand that you've received an Urgent Care treatment only and that you may be released before all your medical problems are known or treated. You, the patient, will arrange for follow up care as instructed. Please arrange follow up with your primary medical clinic within 2-5 days if your signs and symptoms have not resolved or worsen.   - If your condition worsens or fails to improve we recommend that you receive another evaluation at the emergency room immediately or contact your primary medical clinic to discuss your concerns in next 2-5 days.  Strict ER versus clinic  precautions given.      RED FLAGS/WARNING SYMPTOMS DISCUSSED WITH PATIENT THAT WOULD WARRANT EMERGENT MEDICAL ATTENTION. Patient aware and verbalized understanding.      RICE     Rest an injury, elevate it, and use ice and compression as directed.   RICE stands for rest, ice, compression, and elevation. These can limit pain and swelling after an injury. RICE may be recommended to help treat fractures, sprains, strains, and bruises or bumps.   Home care  The following explain the details of RICE:  Rest. Limit the use of the injured body part. This helps prevent further damage to the body part and gives it time to heal. In some cases, you may need a sling, brace, splint, or cast to help keep the body part still until it has healed.  Ice. Applying ice right after an injury helps relieve pain and swelling. Wrap a bag of ice in a thin towel. Then, place it over the injured area. Do this for 10 to 15 minutes every 3 to 4 hours. Continue for the next 1 to 3 days or until your symptoms improve. Never put ice directly on your skin or ice an area longer than 15 minutes at a time.  Compression. Putting pressure on an injury helps reduce swelling and provides support. Wrap the injured area firmly with an elastic bandage/wrap. Make sure not to wrap the bandage too tightly or you will cut off blood flow to the injured area. If your bandage loosens, rewrap it.  Elevation. Keeping an injury raised above the level of your heart reduces swelling, pain, and throbbing. For instance, if you have a broken leg, it may help to rest your leg on several pillows when sitting or lying down. Try to keep the injured area elevated for at least 2 to 3 hours per day.  Follow-up care  Follow up with your healthcare provider, or as advised.  When to seek medical advice  Call your healthcare provider right away if any of these occur:  Fever of 100.4°F (38°C) or higher, or as directed by your healthcare provider  Increased pain or swelling in the  injured body part  Injured body part becomes cold, blue, numb, or tingly  Signs of infection. These include warmth in the skin, redness, drainage, or bad smell coming from the injured body part.  Date Last Reviewed: 1/18/2016  © 5578-6839 [employer]. [address] [place] [address]. All rights reserved. This information is not intended as a substitute for professional medical care. Always follow your healthcare professional's instructions.

## 2023-11-27 ENCOUNTER — OFFICE VISIT (OUTPATIENT)
Dept: SPINE | Facility: CLINIC | Age: 78
End: 2023-11-27
Payer: MEDICARE

## 2023-11-27 VITALS
OXYGEN SATURATION: 100 % | HEIGHT: 66 IN | BODY MASS INDEX: 27.26 KG/M2 | DIASTOLIC BLOOD PRESSURE: 53 MMHG | TEMPERATURE: 98 F | SYSTOLIC BLOOD PRESSURE: 109 MMHG | WEIGHT: 169.63 LBS | HEART RATE: 65 BPM | RESPIRATION RATE: 12 BRPM

## 2023-11-27 DIAGNOSIS — M47.816 SPONDYLOSIS OF LUMBAR REGION WITHOUT MYELOPATHY OR RADICULOPATHY: ICD-10-CM

## 2023-11-27 DIAGNOSIS — M54.9 DORSALGIA: Primary | ICD-10-CM

## 2023-11-27 DIAGNOSIS — M51.36 DDD (DEGENERATIVE DISC DISEASE), LUMBAR: ICD-10-CM

## 2023-11-27 DIAGNOSIS — S39.012A LUMBAR STRAIN, INITIAL ENCOUNTER: ICD-10-CM

## 2023-11-27 PROCEDURE — 99215 OFFICE O/P EST HI 40 MIN: CPT | Mod: PBBFAC | Performed by: NURSE PRACTITIONER

## 2023-11-27 PROCEDURE — 99999 PR PBB SHADOW E&M-EST. PATIENT-LVL V: ICD-10-PCS | Mod: PBBFAC,,, | Performed by: NURSE PRACTITIONER

## 2023-11-27 PROCEDURE — 99214 PR OFFICE/OUTPT VISIT, EST, LEVL IV, 30-39 MIN: ICD-10-PCS | Mod: S$PBB,,, | Performed by: NURSE PRACTITIONER

## 2023-11-27 PROCEDURE — 99999 PR PBB SHADOW E&M-EST. PATIENT-LVL V: CPT | Mod: PBBFAC,,, | Performed by: NURSE PRACTITIONER

## 2023-11-27 PROCEDURE — 99214 OFFICE O/P EST MOD 30 MIN: CPT | Mod: S$PBB,,, | Performed by: NURSE PRACTITIONER

## 2023-11-27 NOTE — PROGRESS NOTES
Subjective:     Patient ID: Anette Willis is a 78 y.o. female.    Chief Complaint: Low-back Pain, Leg Pain, and Neck Pain (Right side)    HPI Ms. Willis is a 78-year-old female here for evaluation of low back and leg pain    Complaints of intermittent low back and leg pain for the past few weeks  She reports that the pain radiates into her right glute down to the calf, she denies numbness or tingling for describes the pain as sharp and shooting  She also endorses neck pain  No PT or injections in the past but has tried stretching exercises  Prescribed gabapentin, meloxicam, and Robaxin    No past medical history on file.    History reviewed. No pertinent surgical history.    No family history on file.    Social History     Socioeconomic History    Marital status:    Tobacco Use    Smoking status: Never     Passive exposure: Never    Smokeless tobacco: Never     Social Determinants of Health     Financial Resource Strain: Low Risk  (11/14/2023)    Overall Financial Resource Strain (CARDIA)     Difficulty of Paying Living Expenses: Not hard at all   Food Insecurity: No Food Insecurity (11/14/2023)    Hunger Vital Sign     Worried About Running Out of Food in the Last Year: Never true     Ran Out of Food in the Last Year: Never true   Transportation Needs: No Transportation Needs (11/14/2023)    PRAPARE - Transportation     Lack of Transportation (Medical): No     Lack of Transportation (Non-Medical): No   Physical Activity: Insufficiently Active (11/14/2023)    Exercise Vital Sign     Days of Exercise per Week: 2 days     Minutes of Exercise per Session: 20 min   Stress: No Stress Concern Present (11/14/2023)    [employer] [place] [employer] - Occupational Stress Questionnaire     Feeling of Stress : Only a little   Social Connections: Unknown (11/14/2023)    Social Connection and Isolation Panel [NHANES]     Frequency of Communication with Friends and Family: More than three times a week     Frequency  of Social Gatherings with Friends and Family: Once a week     Active Member of Clubs or Organizations: Yes     Attends Club or Organization Meetings: 1 to 4 times per year     Marital Status:    Housing Stability: Low Risk  (11/14/2023)    Housing Stability Vital Sign     Unable to Pay for Housing in the Last Year: No     Number of Places Lived in the Last Year: 1     Unstable Housing in the Last Year: No       Current Outpatient Medications   Medication Sig Dispense Refill    aspirin 81 MG Chew Take 81 mg by mouth once daily.      gabapentin (NEURONTIN) 300 MG capsule Take 1 capsule (300 mg total) by mouth every evening for 3 days, THEN 1 capsule (300 mg total) every 12 (twelve) hours. 59 capsule 0    levothyroxine (SYNTHROID) 75 MCG tablet Take 1 tablet (75 mcg total) by mouth every morning. 90 tablet 1    losartan (COZAAR) 100 MG tablet TAKE 1 TABLET BY MOUTH EVERY DAY IN THE MORNING 90 tablet 1    meloxicam (MOBIC) 7.5 MG tablet Take 1 tablet (7.5 mg total) by mouth daily as needed for Pain (Take with food in AM). 30 tablet 0    metoprolol tartrate (LOPRESSOR) 100 MG tablet TAKE 1 TABLET BY MOUTH TWICE A  tablet 1    metoprolol tartrate (LOPRESSOR) 25 MG tablet Take 25 mg by mouth 2 (two) times daily.      rosuvastatin (CRESTOR) 10 MG tablet TAKE 1 TABLET BY MOUTH EVERY DAY 90 tablet 1     No current facility-administered medications for this visit.       Review of patient's allergies indicates:  No Known Allergies      Review of Systems   Constitutional: Negative for fever.   Cardiovascular:  Negative for chest pain.   Respiratory:  Negative for shortness of breath.    Musculoskeletal:  Positive for back pain. Negative for falls.        Right leg   Gastrointestinal:  Negative for bowel incontinence.   Genitourinary:  Negative for bladder incontinence.   Neurological:  Negative for numbness and paresthesias.        Objective:     General: Anette is well-developed, well-nourished, appears stated  age, in no acute distress, alert and oriented to time, place and person.     General    Vitals reviewed.  Constitutional: She is oriented to person, place, and time. She appears well-developed and well-nourished.   HENT:   Head: Atraumatic.   Nose: Nose normal.   Eyes: Conjunctivae are normal.   Cardiovascular:  Normal rate.            Pulmonary/Chest: Effort normal.   Neurological: She is alert and oriented to person, place, and time.   Psychiatric: She has a normal mood and affect. Her behavior is normal. Judgment and thought content normal.     General Musculoskeletal Exam   Gait: normal     Back (L-Spine & T-Spine) / Neck (C-Spine) Exam     Tenderness Right paramedian tenderness of the Lower L-Spine and Sacrum.     Back (L-Spine & T-Spine) Range of Motion   Extension:  10   Flexion:  90     Spinal Sensation   Right Side Sensation  C-Spine Level: normal   L-Spine Level: normal  S-Spine Level: normal  Left Side Sensation  C-Spine Level: normal  L-Spine Level: normal  S-Spine Level: normal    Other   She has no scoliosis .  Spinal Kyphosis:  Absent      Muscle Strength   Right Upper Extremity   Biceps: 5/5   Deltoid:  5/5  Triceps:  5/5  Wrist extension: 5/5   Finger Extensors:  5/5  Left Upper Extremity  Biceps: 5/5   Deltoid:  5/5  Triceps:  5/5  Wrist extension: 5/5   Finger Extensors:  5/5  Right Lower Extremity   Hip Flexion: 5/5   Quadriceps:  5/5   Ankle Dorsiflexion:  5/5   Anterior tibial:  5/5   EHL:  5/5  Left Lower Extremity   Hip Flexion: 5/5   Quadriceps:  5/5   Ankle Dorsiflexion:  5/5   Anterior tibial:  5/5   EHL:  5/5    Reflexes     Left Side  Biceps:  2+  Brachioradialis:  2+  Achilles:  2+    Right Side   Biceps:  2+  Brachioradialis:  2+  Achilles:  2+    Vascular Exam     Right Pulses        Carotid:                  2+    Left Pulses        Carotid:                  2+          Assessment:     1. Dorsalgia    2. DDD (degenerative disc disease), lumbar    3. Spondylosis of lumbar region  without myelopathy or radiculopathy    4. Lumbar strain, initial encounter         Plan:        Prior records reviewed today  We discussed back pain and the nature of back pain.  We discussed that it is not one thing that causes the pain but an accumulation of multiple things that we do.    Referral to physical therapy for evaluation and treatment low back and leg pain  Consider lumbar MRI if no improvement with PT  We discussed posture sitting and the importance of trying to sit better.    We discussed the benefits of therapy and exercise and continuing to move.   Return for follow-up in 8 weeks    More than 50% of the total time  of 45 minutes was spent face to face in counseling on diagnosis and treatment options. I also counseled patient  on common and most usual side effect of prescribed medications.  I reviewed Primary care , and other specialty's notes to better coordinate patient's care. All questions were answered, and patient voiced understanding.      Follow-up: No follow-ups on file. If there are any questions prior to this, the patient was instructed to contact the office.

## 2023-11-29 ENCOUNTER — CLINICAL SUPPORT (OUTPATIENT)
Dept: REHABILITATION | Facility: HOSPITAL | Age: 78
End: 2023-11-29
Payer: MEDICARE

## 2023-11-29 DIAGNOSIS — M47.816 SPONDYLOSIS OF LUMBAR REGION WITHOUT MYELOPATHY OR RADICULOPATHY: ICD-10-CM

## 2023-11-29 DIAGNOSIS — Z74.09 IMPAIRED FUNCTIONAL MOBILITY, BALANCE, GAIT, AND ENDURANCE: Primary | ICD-10-CM

## 2023-11-29 DIAGNOSIS — M51.36 DDD (DEGENERATIVE DISC DISEASE), LUMBAR: ICD-10-CM

## 2023-11-29 DIAGNOSIS — S39.012A LUMBAR STRAIN, INITIAL ENCOUNTER: ICD-10-CM

## 2023-11-29 DIAGNOSIS — M54.2 CERVICALGIA: Primary | ICD-10-CM

## 2023-11-29 PROBLEM — M51.369 DDD (DEGENERATIVE DISC DISEASE), LUMBAR: Status: ACTIVE | Noted: 2023-11-29

## 2023-11-29 PROCEDURE — 97161 PT EVAL LOW COMPLEX 20 MIN: CPT | Mod: PO

## 2023-11-29 PROCEDURE — 97110 THERAPEUTIC EXERCISES: CPT | Mod: PO

## 2023-11-29 NOTE — PLAN OF CARE
[facility]   Physical Therapy Initial Evaluation      Name: Anette Willis  Clinic Number: [id number]    Therapy Diagnosis:   Encounter Diagnoses   Name Primary?    DDD (degenerative disc disease), lumbar     Spondylosis of lumbar region without myelopathy or radiculopathy     Lumbar strain, initial encounter     Impaired functional mobility, balance, gait, and endurance Yes        Physician: Ruth Johnson, NP    Physician Orders: PT Eval and Treat   Medical Diagnosis from Referral: DDD (degenerative disc disease), lumbar [M51.36], Spondylosis of lumbar region without myelopathy or radiculopathy [M47.816], Lumbar strain, initial encounter [S39.012A]   Evaluation Date: 11/29/2023  Authorization Period Expiration: 11/26/24  Plan of Care Expiration: 2/29/24  Progress Note Due: 12/29/23  Date of Surgery: N/A  Visit # / Visits authorized: 1/ 1   FOTO: 1/ 3    Precautions: Standard     Time In: 10:00  Time Out: 10:46  Total Billable Time: 46 minutes    Subjective     Date of onset: several weeks    History of current condition - Barbara reports: right sided back and leg pain that comes and goes for a few weeks. For the last week, she has been feeling good most of the day but has pain at night or early in the morning. She feels like muscles and nerves are extremely tight at times. Pain began after doing some stretches.     Falls: no    Imaging: no    Prior Therapy: no  Social History:  lives with their spouse, no steps in home  Occupation: retired teacher  Prior Level of Function: independent  Current Level of Function: intermittent pain with ADLs    Pain:  Current 0/10, worst 9/10, best 0/10   Location: right low back and leg   Description: muscle feel tight, pain and cramping into the calf,   Aggravating Factors: night time, morning time  Easing Factors: rest    Patients goals: reduce back and leg pain.      Medical History:   No past medical history on file.    Surgical History:   Anette  Lazaro  has no past surgical history on file.      Medications:   Anette has a current medication list which includes the following prescription(s): aspirin, gabapentin, levothyroxine, losartan, meloxicam, metoprolol tartrate, metoprolol tartrate, and rosuvastatin.    Allergies:   Review of patient's allergies indicates:  No Known Allergies     Objective      Observation: enters clinic ambulating independently     Posture:  forward head, rounded shoulders    Lumbar Range of Motion:    Limitations Pain   Flexion Unable to assess   This is what initiated the pain         Extension 0%   no        Left Side Bending 0% no        Right Side Bending 0% no          Rotation: full and pain free bilaterally     Lower Extremity Strength  Right LE  Left LE    Quadriceps: 5/5 Quadriceps: 5/5   Hamstrings: 5/5 Hamstrings: 5/5   Iliospoas: 4/5 Iliospoas: 4/5   Hip extension:  4/5 Hip extension: 4/5   PGM: 4-/5 PGM: 4-/5   Hip ER:  4-/5 Hip ER: 4/5   Hip IR: 4-/5 Hip IR: 4/5   Ankle dorsiflexion: 5/5 Ankle dorsiflexion: 5/5       Special Tests:  -SLR Test: positive R; negative L   -Repeated Flexion: pain not present currently  -Repeated Ext: pain not present currently   -Slump Test: negative bilaterally       Joint Mobility: hypomobile lumbar segmental mobility      Palpation: tenderness at posterior greater trochanter      Flexibility:     -Hamstring : R = mod limitation ; L = mod limitation    Jagdish Test Right  Left    Iliopsoas - -   Rectus Femoris  - -           Intake Outcome Measure for FOTO lumbar Survey    Therapist reviewed FOTO scores for Anette Willis on 11/29/2023.   FOTO report - see Media section or FOTO account episode details.    Intake Score: 65%         Treatment     Total Treatment time (time-based codes) separate from Evaluation: 15 minutes     Barbara received the treatments listed below:      therapeutic exercises to develop strength, endurance, ROM, flexibility, posture, and core stabilization for 10 minutes  "including:  Supine sciatic nerve glide with ankle pump x10 B  Sidelying clamshells 2x10  Pt education  manual therapy techniques: Joint mobilizations, Manual traction, and Soft tissue Mobilization were applied for 0 minutes, including:      neuromuscular re-education activities to improve: Coordination, Kinesthetic, Sense, and Proprioception for 5 minutes. The following activities were included:  Posterior pelvic tilt 20x3"    therapeutic activities to improve functional performance for 0  minutes, including:        Patient Education and Home Exercises     Education provided:   - education on condition  -home exercise program    Written Home Exercises Provided: yes. Exercises were reviewed and Barbara was able to demonstrate them prior to the end of the session.  Barbara demonstrated good  understanding of the education provided. See EMR under Patient Instructions for exercises provided during therapy sessions.    Assessment     Anette is a 78 y.o. female referred to outpatient Physical Therapy with a medical diagnosis of DDD (degenerative disc disease), lumbar [M51.36], Spondylosis of lumbar region without myelopathy or radiculopathy [M47.816], Lumbar strain, initial encounter [S39.012A] . Patient presents with signs/symptoms consistent with neural tension beginning after doing stretches at home several weeks ago. She presents today with positive special testing, lower extremity weakness, and flexibility deficits. She will benefit from skilled PT to address these deficits and reduce risk for future episodes of back and leg pain.     Patient prognosis is Good.   Patient will benefit from skilled outpatient Physical Therapy to address the deficits stated above and in the chart below, provide patient /family education, and to maximize patientt's level of independence.     Plan of care discussed with patient: Yes  Patient's spiritual, cultural and educational needs considered and patient is agreeable to the plan of care and " goals as stated below:     Anticipated Barriers for therapy: none    Medical Necessity is demonstrated by the following  History  Co-morbidities and personal factors that may impact the plan of care [x] LOW: no personal factors / co-morbidities  [] MODERATE: 1-2 personal factors / co-morbidities  [] HIGH: 3+ personal factors / co-morbidities    Moderate / High Support Documentation:   Co-morbidities affecting plan of care:     Personal Factors:   age     Examination  Body Structures and Functions, activity limitations and participation restrictions that may impact the plan of care [x] LOW: addressing 1-2 elements  [] MODERATE: 3+ elements  [] HIGH: 4+ elements (please support below)    Moderate / High Support Documentation:      Clinical Presentation [x] LOW: stable  [] MODERATE: Evolving  [] HIGH: Unstable     Decision Making/ Complexity Score: low       GOALS: (not met, progressing unless otherwise specified)  Short Term Goals:  4 weeks  1.Report decreased right sided back and leg pain  < / =  4/10 at worst to increase tolerance for sleeping  2. Increase lumbar flexion range of motion to 0% limited in order to perform ADLs without difficulty.  3. Increase strength by 1/3 MMT grade in bilateral lower extremity to increase tolerance for ADL and work activities.  4. Pt to tolerate HEP to improve ROM and independence with ADL's    Long Term Goals: 8 weeks  1.Report decreased right sided back and leg pain < / = 2/10  to increase tolerance for ADLs  3.Increase strength to 4+/5 in  bilateral lower extremity to increase tolerance for ADL and work activities.  4. Pt will report at CJ level (20-40%) on FOTO  to demonstrate increase in LE function with every day tasks.      Plan     Plan of care Certification: 11/29/2023 to 2/29/24.    Outpatient Physical Therapy 1 times weekly for 10 weeks to include the following interventions: Cervical/Lumbar Traction, Electrical Stimulation  , Gait Training, Manual Therapy, Moist Heat/  Ice, Neuromuscular Re-ed, Patient Education, Therapeutic Activities, Therapeutic Exercise, and Ultrasound.     Brittni Guzman, PT        Physician's Signature: _________________________________________ Date: ________________

## 2023-11-29 NOTE — Clinical Note
Good afternoon Ruth, This patient reports she has been having neck pain. Would you mind placing a referral for this so we can eval at her next visit?   Thanks, Brittni Guzman

## 2023-12-04 NOTE — PROGRESS NOTES
[facility] Progress Note  12/5/2023          Reason for Visit:      had concerns including Follow-up and Spasms.       History of Present Illness:     Pt is here today for a check up    Left leg pain  Started when stretching a few weeks ago  Went to Urgent care 11/15/23 for this  Rx gabapentin, methocarbmaol and meloxicam.  The methocarbamol has been helping a great deal for sleep, which has been a chronic issue for her    Poor memory  Has been ongoing about 1 year  Has seemed to get worse with 's medical problems  Not affecting ability to do daily activities.  Discussed doing MMSE but she wanted to wait until a future visit.    ASVT  Previously diagnosed.  On metoprolol 100 bid and has extra 25 mg tablets that she takes if she has episodes, which isn't often.  Has always resolved within just a few minutes with 1 or 2 doses of the 25 mg tablet.  Requests refill on that today     Hypothyroidism  Diagnosed on routine labs.  Has been on stable dose for a while  Agreeable to recheck labs in march- orders placed     Senile purpura  Has scattered bruising on both forearms.    No significant bleeding.  Reassured about the senile purupura     HM  Has had pneumococcal vaccination previously at [facility] she says  Has had shingles vaccine in past  Pt declined MMG/Dexa - doesn't want screening tests anymore      Problem List:     Patient Active Problem List   Diagnosis    Hypothyroidism    HTN (hypertension)    Dyslipidemia    SVT (supraventricular tachycardia)    Impaired functional mobility, balance, gait, and endurance    DDD (degenerative disc disease), lumbar    Spondylosis of lumbar region without myelopathy or radiculopathy    Lumbar strain, initial encounter         Medications:       Current Outpatient Medications:     aspirin 81 MG Chew, Take 81 mg by mouth once daily., Disp: , Rfl:     levothyroxine (SYNTHROID) 75 MCG tablet, Take 1 tablet (75 mcg total) by mouth every morning., Disp: 90 tablet,  "Rfl: 1    losartan (COZAAR) 100 MG tablet, Take 1 tablet (100 mg total) by mouth once daily., Disp: 90 tablet, Rfl: 1    methocarbamoL (ROBAXIN) 500 MG Tab, Take 1 tablet (500 mg total) by mouth 3 (three) times daily as needed (90)., Disp: 90 tablet, Rfl: 3    metoprolol tartrate (LOPRESSOR) 100 MG tablet, Take 1 tablet (100 mg total) by mouth 2 (two) times daily., Disp: 180 tablet, Rfl: 1    metoprolol tartrate (LOPRESSOR) 25 MG tablet, Take 1 tablet (25 mg total) by mouth 2 (two) times daily., Disp: 60 tablet, Rfl: 5    rosuvastatin (CRESTOR) 10 MG tablet, Take 1 tablet (10 mg total) by mouth once daily., Disp: 90 tablet, Rfl: 1        Review of Systems:     Review of Systems   Constitutional:  Negative for chills and fever.   HENT:  Negative for ear pain and sore throat.    Respiratory:  Negative for cough, shortness of breath and wheezing.    Cardiovascular:  Negative for chest pain and palpitations.   Gastrointestinal:  Negative for constipation, diarrhea, nausea and vomiting.   Genitourinary:  Negative for dysuria and hematuria.   Musculoskeletal:  Negative for joint swelling and joint deformity.   Neurological:  Negative for dizziness and weakness.           Physical Exam:     Temp:             Blood Pressure:  118/76        Pulse:   69     Respirations:  16  Weight:   75.3 kg (166 lb 0.1 oz)  Height:   5' 6" (1.676 m)  BMI:     Body mass index is 26.79 kg/m².    Physical Exam  Constitutional:       General: She is not in acute distress.  HENT:      Right Ear: Tympanic membrane normal.      Left Ear: Tympanic membrane normal.      Nose: No congestion or rhinorrhea.      Mouth/Throat:      Pharynx: No oropharyngeal exudate or posterior oropharyngeal erythema.   Cardiovascular:      Rate and Rhythm: Normal rate and regular rhythm.   Pulmonary:      Effort: Pulmonary effort is normal. No respiratory distress.      Breath sounds: No wheezing.   Abdominal:      Palpations: Abdomen is soft.      Tenderness: There " is no abdominal tenderness.   Skin:     General: Skin is warm.   Neurological:      General: No focal deficit present.      Mental Status: She is alert and oriented to person, place, and time.           Labs/Imaging/Testing:     Most recent CBC:  Lab Results   Component Value Date    WBC 7.19 03/10/2023    HGB 13.4 03/10/2023     03/10/2023    MCV 84 03/10/2023       Most recent Lipids:  Lab Results   Component Value Date    CHOL 148 03/10/2023    HDL 48 03/10/2023    LDLCALC 82.4 03/10/2023    TRIG 88 03/10/2023       Most recent Chemistry:  Lab Results   Component Value Date     03/10/2023    K 4.6 03/10/2023     03/10/2023    CO2 22 (L) 03/10/2023    BUN 17 03/10/2023    CREATININE 0.9 03/10/2023     03/10/2023    CALCIUM 9.6 03/10/2023    ALT 18 03/10/2023    AST 24 03/10/2023    ALKPHOS 83 03/10/2023    PROT 6.8 03/10/2023    ALBUMIN 3.9 03/10/2023       Other tests:  Lab Results   Component Value Date    TSH 1.183 03/10/2023    FREET4 1.25 03/10/2023         Assessment and Plan:     1. Hypertension, unspecified type  Continue current meds, controlled well    - CBC Auto Differential; Future  - Comprehensive Metabolic Panel; Future  - Lipid Panel; Future  - Hemoglobin A1C; Future  - TSH; Future  - T4, Free; Future  - Vitamin D; Future    2. Hypothyroidism, unspecified type  Recheck thyroid fucntions  Continue current meds  - TSH; Future  - T4, Free; Future    3. Vitamin D deficiency  - Vitamin D; Future    4. Dyslipidemia  - Lipid Panel; Future    Will refill the methocarbamol for the leg spasms but may continue to use prn for sleep    RTC 6 mos or sooner prn       Simon Hernandez MD  12/5/2023    This note was prepared using voice-recognition software.  Although efforts are made to proofread the note, some errors may persist in the final document.

## 2023-12-05 ENCOUNTER — OFFICE VISIT (OUTPATIENT)
Dept: PRIMARY CARE CLINIC | Facility: CLINIC | Age: 78
End: 2023-12-05
Payer: MEDICARE

## 2023-12-05 VITALS
SYSTOLIC BLOOD PRESSURE: 118 MMHG | WEIGHT: 166 LBS | RESPIRATION RATE: 16 BRPM | HEIGHT: 66 IN | BODY MASS INDEX: 26.68 KG/M2 | DIASTOLIC BLOOD PRESSURE: 76 MMHG | HEART RATE: 69 BPM | OXYGEN SATURATION: 96 %

## 2023-12-05 DIAGNOSIS — I10 HYPERTENSION, UNSPECIFIED TYPE: Primary | ICD-10-CM

## 2023-12-05 DIAGNOSIS — E78.5 DYSLIPIDEMIA: ICD-10-CM

## 2023-12-05 DIAGNOSIS — E03.9 HYPOTHYROIDISM, UNSPECIFIED TYPE: ICD-10-CM

## 2023-12-05 DIAGNOSIS — E55.9 VITAMIN D DEFICIENCY: ICD-10-CM

## 2023-12-05 DIAGNOSIS — R73.01 IFG (IMPAIRED FASTING GLUCOSE): ICD-10-CM

## 2023-12-05 PROCEDURE — 99214 OFFICE O/P EST MOD 30 MIN: CPT | Mod: S$PBB,,, | Performed by: INTERNAL MEDICINE

## 2023-12-05 PROCEDURE — 99999 PR PBB SHADOW E&M-EST. PATIENT-LVL III: ICD-10-PCS | Mod: PBBFAC,,, | Performed by: INTERNAL MEDICINE

## 2023-12-05 PROCEDURE — 99213 OFFICE O/P EST LOW 20 MIN: CPT | Mod: PBBFAC,PN | Performed by: INTERNAL MEDICINE

## 2023-12-05 PROCEDURE — 99999 PR PBB SHADOW E&M-EST. PATIENT-LVL III: CPT | Mod: PBBFAC,,, | Performed by: INTERNAL MEDICINE

## 2023-12-05 PROCEDURE — 99214 PR OFFICE/OUTPT VISIT, EST, LEVL IV, 30-39 MIN: ICD-10-PCS | Mod: S$PBB,,, | Performed by: INTERNAL MEDICINE

## 2023-12-05 RX ORDER — METOPROLOL TARTRATE 25 MG/1
25 TABLET, FILM COATED ORAL 2 TIMES DAILY
Qty: 60 TABLET | Refills: 5 | Status: SHIPPED | OUTPATIENT
Start: 2023-12-05 | End: 2024-01-24

## 2023-12-05 RX ORDER — LOSARTAN POTASSIUM 100 MG/1
100 TABLET ORAL DAILY
Qty: 90 TABLET | Refills: 1 | Status: SHIPPED | OUTPATIENT
Start: 2023-12-05

## 2023-12-05 RX ORDER — LEVOTHYROXINE SODIUM 75 UG/1
75 TABLET ORAL EVERY MORNING
Qty: 90 TABLET | Refills: 1 | Status: SHIPPED | OUTPATIENT
Start: 2023-12-05

## 2023-12-05 RX ORDER — METOPROLOL TARTRATE 100 MG/1
100 TABLET ORAL 2 TIMES DAILY
Qty: 180 TABLET | Refills: 1 | Status: SHIPPED | OUTPATIENT
Start: 2023-12-05 | End: 2024-01-24

## 2023-12-05 RX ORDER — METHOCARBAMOL 500 MG/1
500 TABLET, FILM COATED ORAL
COMMUNITY
End: 2023-12-05 | Stop reason: SDUPTHER

## 2023-12-05 RX ORDER — ROSUVASTATIN CALCIUM 10 MG/1
10 TABLET, COATED ORAL DAILY
Qty: 90 TABLET | Refills: 1 | Status: SHIPPED | OUTPATIENT
Start: 2023-12-05

## 2023-12-05 RX ORDER — METHOCARBAMOL 500 MG/1
500 TABLET, FILM COATED ORAL 3 TIMES DAILY PRN
Qty: 90 TABLET | Refills: 3 | Status: SHIPPED | OUTPATIENT
Start: 2023-12-05 | End: 2024-02-29

## 2023-12-05 NOTE — PROGRESS NOTES
"[facility]   Physical Therapy Treatment Note      Name: Anette Willis  Clinic Number: [id number]    Therapy Diagnosis:   Encounter Diagnoses   Name Primary?    Impaired functional mobility, balance, gait, and endurance Yes    DDD (degenerative disc disease), lumbar     Spondylosis of lumbar region without myelopathy or radiculopathy     Lumbar strain, initial encounter      Physician: Ruth Johnson NP    Visit Date: 12/6/2023    Physician Orders: PT Eval and Treat   Medical Diagnosis from Referral: DDD (degenerative disc disease), lumbar [M51.36], Spondylosis of lumbar region without myelopathy or radiculopathy [M47.816], Lumbar strain, initial encounter [S39.012A]   Evaluation Date: 11/29/2023  Authorization Period Expiration: 11/26/24  Plan of Care Expiration: 2/29/24  Progress Note Due: 12/29/23  Date of Surgery: N/A  Visit # / Visits authorized: 1/ 20  FOTO: 1/ 3     Precautions: Standard      Time In: 9:00 am  Time Out: 9:47 am  Total Billable Time: 23 minutes one on one    PTA Visit #: 1/5       Subjective     Patient reports: "if you hurt me I'm not coming back."  She was compliant with home exercise program.  Response to previous treatment: tolerated well  Functional change: ongoing    Pain: 0/10  Location: low back/right LE    Objective      Objective Measures updated at progress report unless specified.     Treatment     Barbara received the treatments listed below:      Bold = performed    therapeutic exercises to develop strength, endurance, ROM, flexibility, posture, and core stabilization for 27 minutes including:  Nu step 5'  LTR 2x10  Hamstring string stretch 3x30"  Supine sciatic nerve glide with ankle pump x20 B  Sidelying clamshells 2x10  Bridges 2x10  Review of HEP    manual therapy techniques: Joint mobilizations, Manual traction, and Soft tissue Mobilization were applied for 0 minutes, including:        neuromuscular re-education activities to improve: " "Coordination, Kinesthetic, Sense, and Proprioception for 20 minutes. The following activities were included:  Posterior pelvic tilt 20x3"  TrA activation with orange physioball 20x3"  Hip adduction isometrics with ball 20x3"  Hip abduction isometrics with pilates ring 20x3"     therapeutic activities to improve functional performance for 0  minutes, including:    Patient Education and Home Exercises       Education provided:   - HEP  - Possibility of delayed onset muscle soreness from starting new exercises    Written Home Exercises Provided: Patient instructed to cont prior HEP. Exercises were reviewed and Barbara was able to demonstrate them prior to the end of the session.  Barbara demonstrated good  understanding of the education provided. See Electronic Medical Record under Patient Instructions for exercises provided during therapy sessions    Assessment     Barbara presents to treatment ambulating independently. She denied having pain today and reports compliance with HEP provided at initial evaluation. Discussed starting a walking program to tolerance as patient reports she used to walk 4-5x/week. Education provided on possibility of delayed onset muscle soreness due to starting new exercises. Good tolerance to exercises with no pain reported today. She was provided with additional exercises for HEP. Continue to progress as tolerated.     Barbara Is progressing well towards her goals.   Patient prognosis is Good.     Patient will continue to benefit from skilled outpatient physical therapy to address the deficits listed in the problem list box on initial evaluation, provide pt/family education and to maximize pt's level of independence in the home and community environment.     Patient's spiritual, cultural and educational needs considered and pt agreeable to plan of care and goals.     Anticipated barriers to physical therapy: none    GOALS: (not met, progressing unless otherwise specified)  Short Term Goals:  4 " weeks  1.Report decreased right sided back and leg pain  < / =  4/10 at worst to increase tolerance for sleeping  2. Increase lumbar flexion range of motion to 0% limited in order to perform ADLs without difficulty.  3. Increase strength by 1/3 MMT grade in bilateral lower extremity to increase tolerance for ADL and work activities.  4. Pt to tolerate HEP to improve ROM and independence with ADL's     Long Term Goals: 8 weeks  1.Report decreased right sided back and leg pain < / = 2/10  to increase tolerance for ADLs  3.Increase strength to 4+/5 in  bilateral lower extremity to increase tolerance for ADL and work activities.  4. Pt will report at CJ level (20-40%) on FOTO  to demonstrate increase in LE function with every day tasks.      Plan     Continue to progress per PT POC    Nathalia Chapman PTA

## 2023-12-06 ENCOUNTER — CLINICAL SUPPORT (OUTPATIENT)
Dept: REHABILITATION | Facility: HOSPITAL | Age: 78
End: 2023-12-06
Payer: MEDICARE

## 2023-12-06 DIAGNOSIS — S39.012A LUMBAR STRAIN, INITIAL ENCOUNTER: ICD-10-CM

## 2023-12-06 DIAGNOSIS — M47.816 SPONDYLOSIS OF LUMBAR REGION WITHOUT MYELOPATHY OR RADICULOPATHY: ICD-10-CM

## 2023-12-06 DIAGNOSIS — Z74.09 IMPAIRED FUNCTIONAL MOBILITY, BALANCE, GAIT, AND ENDURANCE: Primary | ICD-10-CM

## 2023-12-06 DIAGNOSIS — M51.36 DDD (DEGENERATIVE DISC DISEASE), LUMBAR: ICD-10-CM

## 2023-12-06 PROCEDURE — 97110 THERAPEUTIC EXERCISES: CPT | Mod: PO,CQ

## 2023-12-06 PROCEDURE — 97112 NEUROMUSCULAR REEDUCATION: CPT | Mod: PO,CQ

## 2023-12-12 NOTE — PROGRESS NOTES
"[facility]   Physical Therapy Treatment Note      Name: Anette Willis  Clinic Number: [id number]    Therapy Diagnosis:   Encounter Diagnoses   Name Primary?    Impaired functional mobility, balance, gait, and endurance Yes    DDD (degenerative disc disease), lumbar     Spondylosis of lumbar region without myelopathy or radiculopathy     Lumbar strain, initial encounter        Physician: Ruth Johnson NP    Visit Date: 12/13/2023    Physician Orders: PT Eval and Treat   Medical Diagnosis from Referral: DDD (degenerative disc disease), lumbar [M51.36], Spondylosis of lumbar region without myelopathy or radiculopathy [M47.816], Lumbar strain, initial encounter [S39.012A]   Evaluation Date: 11/29/2023  Authorization Period Expiration: 11/26/24  Plan of Care Expiration: 2/29/24  Progress Note Due: 12/29/23  Date of Surgery: N/A  Visit # / Visits authorized: 2/ 20  FOTO: 1/ 3     Precautions: Standard      Time In: 10:00  Time Out: 10:48  Total Billable Time: 25 minutes    PTA Visit #: 0/5       Subjective     Patient reports: getting twinges of pain in the right calf but nothing compared to what it was  She was compliant with home exercise program.  Response to previous treatment: tolerated well  Functional change: ongoing    Pain: .5 /10  Location: low back/right LE    Objective      Objective Measures updated at progress report unless specified.     Treatment     Barbara received the treatments listed below:      Bold = performed    therapeutic exercises to develop strength, endurance, ROM, flexibility, posture, and core stabilization for 25 minutes including:  Nu step 5'  LTR 2x10  Hamstring string stretch 3x30"  Supine sciatic nerve glide with ankle pump x20 B  Sidelying clamshells with red band 2x10 B  Bridges with red band 2x10  Review of HEP    manual therapy techniques: Joint mobilizations, Manual traction, and Soft tissue Mobilization were applied for 8 minutes, including:   " "hypervolt to right calf     neuromuscular re-education activities to improve: Coordination, Kinesthetic, Sense, and Proprioception for 15 minutes. The following activities were included:  Posterior pelvic tilt 20x3"  TrA activation with orange physioball 20x3"  Hip adduction isometrics with ball 20x3"  Hip abduction isometrics with pilates ring 20x3"     therapeutic activities to improve functional performance for 0  minutes, including:    Patient Education and Home Exercises       Education provided:   - HEP  - Possibility of delayed onset muscle soreness from starting new exercises    Written Home Exercises Provided: Patient instructed to cont prior HEP. Exercises were reviewed and Barbara was able to demonstrate them prior to the end of the session.  Barbara demonstrated good  understanding of the education provided. See Electronic Medical Record under Patient Instructions for exercises provided during therapy sessions    Assessment     Barbara presents with .5/10 pain today identified only as intermittent twinges of pain in the calf. Otherwise, she reports compliance with HEP and was provided with red theraband and printout of bridges to ADD to her home program. She was able to complete all exercises within session and will continue to benefit from strengthening to reduce future instances of back pain.     Barbara Is progressing well towards her goals.   Patient prognosis is Good.     Patient will continue to benefit from skilled outpatient physical therapy to address the deficits listed in the problem list box on initial evaluation, provide pt/family education and to maximize pt's level of independence in the home and community environment.     Patient's spiritual, cultural and educational needs considered and pt agreeable to plan of care and goals.     Anticipated barriers to physical therapy: none    GOALS: (not met, progressing unless otherwise specified)  Short Term Goals:  4 weeks   1.Report decreased right sided back " and leg pain  < / =  4/10 at worst to increase tolerance for sleeping  2. Increase lumbar flexion range of motion to 0% limited in order to perform ADLs without difficulty.  3. Increase strength by 1/3 MMT grade in bilateral lower extremity to increase tolerance for ADL and work activities.  4. Pt to tolerate HEP to improve ROM and independence with ADL's     Long Term Goals: 8 weeks  1.Report decreased right sided back and leg pain < / = 2/10  to increase tolerance for ADLs  3.Increase strength to 4+/5 in  bilateral lower extremity to increase tolerance for ADL and work activities.  4. Pt will report at CJ level (20-40%) on FOTO  to demonstrate increase in LE function with every day tasks.      Plan     Continue to progress per PT POC    Brittni Guzman, PT

## 2023-12-13 ENCOUNTER — CLINICAL SUPPORT (OUTPATIENT)
Dept: REHABILITATION | Facility: HOSPITAL | Age: 78
End: 2023-12-13
Payer: MEDICARE

## 2023-12-13 DIAGNOSIS — M47.816 SPONDYLOSIS OF LUMBAR REGION WITHOUT MYELOPATHY OR RADICULOPATHY: ICD-10-CM

## 2023-12-13 DIAGNOSIS — Z74.09 IMPAIRED FUNCTIONAL MOBILITY, BALANCE, GAIT, AND ENDURANCE: Primary | ICD-10-CM

## 2023-12-13 DIAGNOSIS — M51.36 DDD (DEGENERATIVE DISC DISEASE), LUMBAR: ICD-10-CM

## 2023-12-13 DIAGNOSIS — S39.012A LUMBAR STRAIN, INITIAL ENCOUNTER: ICD-10-CM

## 2023-12-13 PROCEDURE — 97112 NEUROMUSCULAR REEDUCATION: CPT | Mod: PO

## 2023-12-13 PROCEDURE — 97140 MANUAL THERAPY 1/> REGIONS: CPT | Mod: PO

## 2023-12-20 ENCOUNTER — CLINICAL SUPPORT (OUTPATIENT)
Dept: REHABILITATION | Facility: HOSPITAL | Age: 78
End: 2023-12-20
Payer: MEDICARE

## 2023-12-20 DIAGNOSIS — Z74.09 IMPAIRED FUNCTIONAL MOBILITY, BALANCE, GAIT, AND ENDURANCE: Primary | ICD-10-CM

## 2023-12-20 DIAGNOSIS — S39.012A LUMBAR STRAIN, INITIAL ENCOUNTER: ICD-10-CM

## 2023-12-20 DIAGNOSIS — M51.36 DDD (DEGENERATIVE DISC DISEASE), LUMBAR: ICD-10-CM

## 2023-12-20 DIAGNOSIS — M47.816 SPONDYLOSIS OF LUMBAR REGION WITHOUT MYELOPATHY OR RADICULOPATHY: ICD-10-CM

## 2023-12-20 PROCEDURE — 97140 MANUAL THERAPY 1/> REGIONS: CPT | Mod: PO,CQ

## 2023-12-20 PROCEDURE — 97110 THERAPEUTIC EXERCISES: CPT | Mod: PO,CQ

## 2023-12-20 PROCEDURE — 97112 NEUROMUSCULAR REEDUCATION: CPT | Mod: PO,CQ

## 2023-12-20 NOTE — PROGRESS NOTES
"[facility]   Physical Therapy Treatment Note      Name: Anette Willis  Clinic Number: [id number]    Therapy Diagnosis:   Encounter Diagnoses   Name Primary?    Impaired functional mobility, balance, gait, and endurance Yes    DDD (degenerative disc disease), lumbar     Spondylosis of lumbar region without myelopathy or radiculopathy     Lumbar strain, initial encounter        Physician: Ruth Johnson NP    Visit Date: 12/20/2023    Physician Orders: PT Eval and Treat   Medical Diagnosis from Referral: DDD (degenerative disc disease), lumbar [M51.36], Spondylosis of lumbar region without myelopathy or radiculopathy [M47.816], Lumbar strain, initial encounter [S39.012A]   Evaluation Date: 11/29/2023  Authorization Period Expiration: 11/26/24  Plan of Care Expiration: 2/29/24  Progress Note Due: 12/29/23  Date of Surgery: N/A  Visit # / Visits authorized: 3/ 20  FOTO: 1/ 3     Precautions: Standard      Time In: 10:00 am  Time Out: 10:50 am  Total Billable Time: 50 minutes    PTA Visit #: 1/5       Subjective     Patient reports: she has been having some pain in her right groin the past couple days  She was compliant with home exercise program.  Response to previous treatment: tolerated well  Functional change: ongoing    Pain: 0 /10  Location: low back/right LE, R groin - pain with certain movements only    Objective      Objective Measures updated at progress report unless specified.     Treatment     Barbara received the treatments listed below:      Bold = performed    therapeutic exercises to develop strength, endurance, ROM, flexibility, posture, and core stabilization for 20 minutes including:  Nu step 5'  LTR 2x10  Hamstring string stretch 3x30"  Supine sciatic nerve glide with ankle pump x20 B  Sidelying clamshells with red band 2x10 B  Bridges with red band 2x10  +seated ball rollouts 10x10"  Review of HEP    manual therapy techniques: Joint mobilizations, Manual traction, and " "Soft tissue Mobilization were applied for 10 minutes, including:   hypervolt to right calf  +short axis traction     neuromuscular re-education activities to improve: Coordination, Kinesthetic, Sense, and Proprioception for 20 minutes. The following activities were included:  Posterior pelvic tilt 20x3"  TrA activation with orange physioball 20x3"  Hip adduction isometrics with ball 20x3"  Hip abduction isometrics with pilates ring 20x3"     therapeutic activities to improve functional performance for 0  minutes, including:    Patient Education and Home Exercises       Education provided:   - HEP  - Possibility of delayed onset muscle soreness from starting new exercises    Written Home Exercises Provided: Patient instructed to cont prior HEP. Exercises were reviewed and Barbara was able to demonstrate them prior to the end of the session.  Barbara demonstrated good  understanding of the education provided. See Electronic Medical Record under Patient Instructions for exercises provided during therapy sessions    Assessment     Barbara presents to treatment with reports of right groin pain that she has had the last few days. Favorable response to manual therapy interventions with addition of short axis traction. Good tolerance to exercises with no increase in pain. Continue to progress as tolerated.     Barbara Is progressing well towards her goals.   Patient prognosis is Good.     Patient will continue to benefit from skilled outpatient physical therapy to address the deficits listed in the problem list box on initial evaluation, provide pt/family education and to maximize pt's level of independence in the home and community environment.     Patient's spiritual, cultural and educational needs considered and pt agreeable to plan of care and goals.     Anticipated barriers to physical therapy: none    GOALS: (not met, progressing unless otherwise specified)  Short Term Goals:  4 weeks   1.Report decreased right sided back and leg " pain  < / =  4/10 at worst to increase tolerance for sleeping  2. Increase lumbar flexion range of motion to 0% limited in order to perform ADLs without difficulty.  3. Increase strength by 1/3 MMT grade in bilateral lower extremity to increase tolerance for ADL and work activities.  4. Pt to tolerate HEP to improve ROM and independence with ADL's     Long Term Goals: 8 weeks  1.Report decreased right sided back and leg pain < / = 2/10  to increase tolerance for ADLs  3.Increase strength to 4+/5 in  bilateral lower extremity to increase tolerance for ADL and work activities.  4. Pt will report at CJ level (20-40%) on FOTO  to demonstrate increase in LE function with every day tasks.      Plan     Continue to progress per PT POC    Nathalia Chapman, PTA

## 2023-12-20 NOTE — PROGRESS NOTES
"[facility]   Physical Therapy Treatment Note      Name: Anette Willis  Clinic Number: [id number]    Therapy Diagnosis:   Encounter Diagnoses   Name Primary?    Impaired functional mobility, balance, gait, and endurance Yes    DDD (degenerative disc disease), lumbar     Spondylosis of lumbar region without myelopathy or radiculopathy     Lumbar strain, initial encounter        Physician: Ruth Johnson NP    Visit Date: 12/20/2023    Physician Orders: PT Eval and Treat   Medical Diagnosis from Referral: DDD (degenerative disc disease), lumbar [M51.36], Spondylosis of lumbar region without myelopathy or radiculopathy [M47.816], Lumbar strain, initial encounter [S39.012A]   Evaluation Date: 11/29/2023  Authorization Period Expiration: 11/26/24  Plan of Care Expiration: 2/29/24  Progress Note Due: 12/29/23  Date of Surgery: N/A  Visit # / Visits authorized: 2/ 20  FOTO: 1/ 3     Precautions: Standard      Time In: 10:00  Time Out: 10:48  Total Billable Time: 25 minutes    PTA Visit #: 0/5       Subjective     Patient reports: getting twinges of pain in the right calf but nothing compared to what it was  She was compliant with home exercise program.  Response to previous treatment: tolerated well  Functional change: ongoing    Pain: .5 /10  Location: low back/right LE    Objective      Objective Measures updated at progress report unless specified.     Treatment     Barbara received the treatments listed below:      Bold = performed    therapeutic exercises to develop strength, endurance, ROM, flexibility, posture, and core stabilization for 25 minutes including:  Nu step 5'  LTR 2x10  Hamstring string stretch 3x30"  Supine sciatic nerve glide with ankle pump x20 B  Sidelying clamshells with red band 2x10 B  Bridges with red band 2x10  Review of HEP    manual therapy techniques: Joint mobilizations, Manual traction, and Soft tissue Mobilization were applied for 8 minutes, including:   " "hypervolt to right calf     neuromuscular re-education activities to improve: Coordination, Kinesthetic, Sense, and Proprioception for 15 minutes. The following activities were included:  Posterior pelvic tilt 20x3"  TrA activation with orange physioball 20x3"  Hip adduction isometrics with ball 20x3"  Hip abduction isometrics with pilates ring 20x3"     therapeutic activities to improve functional performance for 0  minutes, including:    Patient Education and Home Exercises       Education provided:   - HEP  - Possibility of delayed onset muscle soreness from starting new exercises    Written Home Exercises Provided: Patient instructed to cont prior HEP. Exercises were reviewed and Barbara was able to demonstrate them prior to the end of the session.  Barbara demonstrated good  understanding of the education provided. See Electronic Medical Record under Patient Instructions for exercises provided during therapy sessions    Assessment     Barbara presents with .5/10 pain today identified only as intermittent twinges of pain in the calf. Otherwise, she reports compliance with HEP and was provided with red theraband and printout of bridges to ADD to her home program. She was able to complete all exercises within session and will continue to benefit from strengthening to reduce future instances of back pain.     Barbara Is progressing well towards her goals.   Patient prognosis is Good.     Patient will continue to benefit from skilled outpatient physical therapy to address the deficits listed in the problem list box on initial evaluation, provide pt/family education and to maximize pt's level of independence in the home and community environment.     Patient's spiritual, cultural and educational needs considered and pt agreeable to plan of care and goals.     Anticipated barriers to physical therapy: none    GOALS: (not met, progressing unless otherwise specified)  Short Term Goals:  4 weeks   1.Report decreased right sided back " and leg pain  < / =  4/10 at worst to increase tolerance for sleeping  2. Increase lumbar flexion range of motion to 0% limited in order to perform ADLs without difficulty.  3. Increase strength by 1/3 MMT grade in bilateral lower extremity to increase tolerance for ADL and work activities.  4. Pt to tolerate HEP to improve ROM and independence with ADL's     Long Term Goals: 8 weeks  1.Report decreased right sided back and leg pain < / = 2/10  to increase tolerance for ADLs  3.Increase strength to 4+/5 in  bilateral lower extremity to increase tolerance for ADL and work activities.  4. Pt will report at CJ level (20-40%) on FOTO  to demonstrate increase in LE function with every day tasks.      Plan     Continue to progress per PT POC    Nunu Hood, PT

## 2023-12-22 ENCOUNTER — OFFICE VISIT (OUTPATIENT)
Dept: INTERNAL MEDICINE | Facility: CLINIC | Age: 78
End: 2023-12-22
Payer: MEDICARE

## 2023-12-22 VITALS
SYSTOLIC BLOOD PRESSURE: 138 MMHG | HEART RATE: 64 BPM | HEIGHT: 66 IN | BODY MASS INDEX: 27.07 KG/M2 | RESPIRATION RATE: 16 BRPM | WEIGHT: 168.44 LBS | OXYGEN SATURATION: 97 % | DIASTOLIC BLOOD PRESSURE: 60 MMHG

## 2023-12-22 DIAGNOSIS — S39.012A LUMBAR STRAIN, INITIAL ENCOUNTER: Primary | ICD-10-CM

## 2023-12-22 PROCEDURE — 99214 OFFICE O/P EST MOD 30 MIN: CPT | Mod: PBBFAC | Performed by: STUDENT IN AN ORGANIZED HEALTH CARE EDUCATION/TRAINING PROGRAM

## 2023-12-22 PROCEDURE — 99999 PR PBB SHADOW E&M-EST. PATIENT-LVL IV: ICD-10-PCS | Mod: PBBFAC,,, | Performed by: STUDENT IN AN ORGANIZED HEALTH CARE EDUCATION/TRAINING PROGRAM

## 2023-12-22 PROCEDURE — 99213 PR OFFICE/OUTPT VISIT, EST, LEVL III, 20-29 MIN: ICD-10-PCS | Mod: S$PBB,,, | Performed by: STUDENT IN AN ORGANIZED HEALTH CARE EDUCATION/TRAINING PROGRAM

## 2023-12-22 PROCEDURE — 99213 OFFICE O/P EST LOW 20 MIN: CPT | Mod: S$PBB,,, | Performed by: STUDENT IN AN ORGANIZED HEALTH CARE EDUCATION/TRAINING PROGRAM

## 2023-12-22 PROCEDURE — 99999 PR PBB SHADOW E&M-EST. PATIENT-LVL IV: CPT | Mod: PBBFAC,,, | Performed by: STUDENT IN AN ORGANIZED HEALTH CARE EDUCATION/TRAINING PROGRAM

## 2023-12-22 RX ORDER — PREDNISONE 20 MG/1
40 TABLET ORAL DAILY
Qty: 10 TABLET | Refills: 0 | Status: SHIPPED | OUTPATIENT
Start: 2023-12-22 | End: 2023-12-27

## 2023-12-22 RX ORDER — MELOXICAM 7.5 MG/1
7.5 TABLET ORAL DAILY PRN
Qty: 30 TABLET | Refills: 0 | Status: SHIPPED | OUTPATIENT
Start: 2023-12-22 | End: 2024-01-18

## 2023-12-22 NOTE — PATIENT INSTRUCTIONS
[facility] [phone]    Spa sensations by [facility] 12 inch memory foam mattress firm.

## 2023-12-22 NOTE — PROGRESS NOTES
Subjective     Patient ID: Anette Willis is a 78 y.o. female.    Chief Complaint: Leg Pain    Leg Pain       Patient is a 79 yo female here for right leg pain and low back pain. Had similar pain a month ago, went to  then. She never used to stretch or do exercises but she started 6 weeks ago. A week after she started, that is when her pain started. She received some meds at  and felt better but this pain started again yesterday. She went walking 2 days ago but not other strenuous activity. She is doing PT once a week. She applied ice a little bit. This pain is worse when sitting and laying down. Unable to sleep well at night. Describes it like an electric shock in her lower leg. At the , she received robaxin, meloxicam and gabapentin. She even saw spine service after that who referred her to PT. Said to f/u in 8 weeks. If no improvement after PT, they will do MRI.   -might need to change her mattress.   Review of Systems   Constitutional:  Negative for chills and fever.   HENT:  Negative for rhinorrhea and sore throat.    Respiratory:  Negative for cough, chest tightness and shortness of breath.    Cardiovascular: Negative.  Negative for chest pain.   Gastrointestinal:  Negative for abdominal pain, blood in stool, constipation and diarrhea.   Genitourinary:  Negative for dysuria and hematuria.   Musculoskeletal:  Positive for back pain and leg pain.   Neurological:  Negative for dizziness, light-headedness and headaches.          Objective     Physical Exam  Vitals and nursing note reviewed.   Constitutional:       Appearance: Normal appearance.   Eyes:      Conjunctiva/sclera: Conjunctivae normal.   Cardiovascular:      Rate and Rhythm: Normal rate and regular rhythm.      Heart sounds: Normal heart sounds.   Pulmonary:      Effort: Pulmonary effort is normal. No respiratory distress.      Breath sounds: Normal breath sounds.   Musculoskeletal:      Right lower leg: No edema.      Left lower leg: No edema.       Comments: No pain with palpation of the spine or the muscles on the right side of the back   Skin:     General: Skin is warm.   Neurological:      Mental Status: She is alert and oriented to person, place, and time.   Psychiatric:         Mood and Affect: Mood normal.         Behavior: Behavior normal.            Assessment and Plan     1. Lumbar strain, initial encounter  Assessment & Plan:  Possible lumbar strain from her exercises and her posture. She saw spine clinic who asked her to return in 8 weeks after PT. If no improvement, they will do MRI. Asked pt to do stretching exercises. Apply ice 2-3 times a day for 10-15 mins. Will try prednisone for 5 days and will refill meloxicam. Asked her to buy a new mattress which is firm, her current one is too soft.       Other orders  -     predniSONE (DELTASONE) 20 MG tablet; Take 2 tablets (40 mg total) by mouth once daily. for 5 days  Dispense: 10 tablet; Refill: 0  -     meloxicam (MOBIC) 7.5 MG tablet; Take 1 tablet (7.5 mg total) by mouth daily as needed for Pain.  Dispense: 30 tablet; Refill: 0

## 2023-12-22 NOTE — ASSESSMENT & PLAN NOTE
Possible lumbar strain from her exercises and her posture. She saw spine clinic who asked her to return in 8 weeks after PT. If no improvement, they will do MRI. Asked pt to do stretching exercises. Apply ice 2-3 times a day for 10-15 mins. Will try prednisone for 5 days and will refill meloxicam. Asked her to buy a new mattress which is firm, her current one is too soft.

## 2023-12-28 ENCOUNTER — PATIENT MESSAGE (OUTPATIENT)
Dept: PRIMARY CARE CLINIC | Facility: CLINIC | Age: 78
End: 2023-12-28
Payer: MEDICARE

## 2024-01-05 ENCOUNTER — PATIENT MESSAGE (OUTPATIENT)
Dept: PRIMARY CARE CLINIC | Facility: CLINIC | Age: 79
End: 2024-01-05

## 2024-01-05 ENCOUNTER — OFFICE VISIT (OUTPATIENT)
Dept: PRIMARY CARE CLINIC | Facility: CLINIC | Age: 79
End: 2024-01-05
Payer: MEDICARE

## 2024-01-05 DIAGNOSIS — D69.2 SENILE PURPURA: Primary | ICD-10-CM

## 2024-01-05 DIAGNOSIS — I47.10 SVT (SUPRAVENTRICULAR TACHYCARDIA): ICD-10-CM

## 2024-01-05 DIAGNOSIS — M54.32 SCIATICA, LEFT SIDE: ICD-10-CM

## 2024-01-05 PROCEDURE — 99214 OFFICE O/P EST MOD 30 MIN: CPT | Mod: 95,,, | Performed by: INTERNAL MEDICINE

## 2024-01-05 RX ORDER — TRAMADOL HYDROCHLORIDE 50 MG/1
50 TABLET ORAL EVERY 6 HOURS PRN
Qty: 28 TABLET | Refills: 0 | Status: SHIPPED | OUTPATIENT
Start: 2024-01-05 | End: 2024-01-24

## 2024-01-05 RX ORDER — METHYLPREDNISOLONE 4 MG/1
TABLET ORAL
Qty: 21 EACH | Refills: 0 | Status: SHIPPED | OUTPATIENT
Start: 2024-01-05 | End: 2024-01-24

## 2024-01-05 NOTE — TELEPHONE ENCOUNTER
Please see my chart message.     Per pt she would like to move forward with the Steroids and pain medication. States since visit earlier today she's experienced severe leg pain

## 2024-01-05 NOTE — PROGRESS NOTES
[facility] Progress Note  1/5/2024    This was a virtual visit conducted via webcam.      Reason for Visit:      is discussing leg pain  Time spent on visit today: 15 minutes    History of Present Illness:     Leg started to get better after her previous visit/injury in early December.  Was doing physical therapy, but then.  She says she moved boxes that she didn't think was too heavy, then it all restarted.  For the last 2 weeks or so has had singificant pain, severely limiting her activities.  Was mostly in the back of thigh and calf.  Walking made it worse.  She started taking the same meds from the first time, and they didn't help at all this time, whereas the first time they worked very well (meloxicam, gabapentin and methocarbamol).      She does feel better today than she has in a couple weeks.  We disucssed option of doing steroids, but she prefers to stick with PT for now and do gentle exercises at home.    She has PT still ordered and goes on Wednesdays - she will cotninue to do that.  If not improving, discussed plan to get MRI of lumbar spine.        Problem List:     Problem List:  2024-01: Senile purpura  2023-11: Impaired functional mobility, balance, gait, and endurance  2023-11: DDD (degenerative disc disease), lumbar  2023-11: Spondylosis of lumbar region without myelopathy or   radiculopathy  2023-11: Lumbar strain, initial encounter  2023-03: Hypothyroidism  2023-03: HTN (hypertension)  2023-03: Dyslipidemia  2023-03: SVT (supraventricular tachycardia)        Medications:       Current Outpatient Medications:     aspirin 81 MG Chew, Take 81 mg by mouth once daily., Disp: , Rfl:     levothyroxine (SYNTHROID) 75 MCG tablet, Take 1 tablet (75 mcg total) by mouth every morning., Disp: 90 tablet, Rfl: 1    losartan (COZAAR) 100 MG tablet, Take 1 tablet (100 mg total) by mouth once daily., Disp: 90 tablet, Rfl: 1    meloxicam (MOBIC) 7.5 MG tablet, Take 1 tablet (7.5 mg total) by  mouth daily as needed for Pain., Disp: 30 tablet, Rfl: 0    methocarbamoL (ROBAXIN) 500 MG Tab, Take 1 tablet (500 mg total) by mouth 3 (three) times daily as needed (90)., Disp: 90 tablet, Rfl: 3    metoprolol tartrate (LOPRESSOR) 100 MG tablet, Take 1 tablet (100 mg total) by mouth 2 (two) times daily., Disp: 180 tablet, Rfl: 1    metoprolol tartrate (LOPRESSOR) 25 MG tablet, Take 1 tablet (25 mg total) by mouth 2 (two) times daily., Disp: 60 tablet, Rfl: 5    rosuvastatin (CRESTOR) 10 MG tablet, Take 1 tablet (10 mg total) by mouth once daily., Disp: 90 tablet, Rfl: 1      Review of Systems:     Review of Systems   Constitutional:  Positive for activity change. Negative for unexpected weight change.   HENT:  Negative for hearing loss, rhinorrhea and trouble swallowing.    Eyes:  Negative for discharge and visual disturbance.   Respiratory:  Negative for chest tightness and wheezing.    Cardiovascular:  Negative for chest pain and palpitations.   Gastrointestinal:  Negative for blood in stool, constipation, diarrhea and vomiting.   Endocrine: Negative for polydipsia and polyuria.   Genitourinary:  Negative for difficulty urinating, dysuria, hematuria and menstrual problem.   Musculoskeletal:  Negative for arthralgias, joint swelling and neck pain.   Neurological:  Positive for weakness. Negative for headaches.   Psychiatric/Behavioral:  Negative for confusion and dysphoric mood.            Physical Exam:     Pt is alert and oriented.  Speech is clear and coherent.  Speaking in complete sentences.  No distress.  Mood and affect are normal.      Assessment and Plan:     1. Sciatica, left side  See discussion in HPI.  Pt will keep PT appts and plan MRI if not improved/recurs.    2. Senile purpura  Stable    3. SVT (supraventricular tachycardia)  Stable, continue metoprolol       Simon Hernandez MD  1/5/2024    This note was prepared using voice-recognition software.  Although efforts are made to proofread the note,  some errors may persist in the final document.    Dr. Hernandez's [place] License number is [id number]  This was a virtual (audio/video visit) in lieu of in-person visit in context of the coronavirus emergency.      Patient/Family members identity was confirmed, and confidentiality/privacy confirmed prior to visit. Verbal informed consent was obtained from the patient's legal guardian or patient when appropriate to conduct this virtual visit.  They authorized me to provide medical care and voiced understanding of the risks, benefits, and alternatives of virtual care.  Guardian understands the limitations inherent of a virtual visit, that they may choose to be seen in person if desired or needed, and that they may halt the virtual visit at any time for any reason.     Originating Site: Patient's home   Distant Site:  [facility]     I certify that this visit was done via secure two-way simultaneous audio and video transmission with informed consent of the patient and/or guardian. Over 50% of the time was counseling or coordinating care.

## 2024-01-10 ENCOUNTER — CLINICAL SUPPORT (OUTPATIENT)
Dept: REHABILITATION | Facility: HOSPITAL | Age: 79
End: 2024-01-10
Payer: MEDICARE

## 2024-01-10 DIAGNOSIS — S39.012A LUMBAR STRAIN, INITIAL ENCOUNTER: ICD-10-CM

## 2024-01-10 DIAGNOSIS — M47.816 SPONDYLOSIS OF LUMBAR REGION WITHOUT MYELOPATHY OR RADICULOPATHY: ICD-10-CM

## 2024-01-10 DIAGNOSIS — M51.36 DDD (DEGENERATIVE DISC DISEASE), LUMBAR: ICD-10-CM

## 2024-01-10 DIAGNOSIS — Z74.09 IMPAIRED FUNCTIONAL MOBILITY, BALANCE, GAIT, AND ENDURANCE: Primary | ICD-10-CM

## 2024-01-10 PROCEDURE — 97110 THERAPEUTIC EXERCISES: CPT | Mod: PO

## 2024-01-10 PROCEDURE — 97112 NEUROMUSCULAR REEDUCATION: CPT | Mod: PO

## 2024-01-10 NOTE — PROGRESS NOTES
[facility]   Physical Therapy Treatment Note /Reassessment Note     Name: Anette Willis  Clinic Number: [id number]    Therapy Diagnosis:   Encounter Diagnoses   Name Primary?    Impaired functional mobility, balance, gait, and endurance Yes    DDD (degenerative disc disease), lumbar     Spondylosis of lumbar region without myelopathy or radiculopathy     Lumbar strain, initial encounter          Physician: Ruth Johnson NP    Visit Date: 1/10/2024    Physician Orders: PT Eval and Treat   Medical Diagnosis from Referral: DDD (degenerative disc disease), lumbar [M51.36], Spondylosis of lumbar region without myelopathy or radiculopathy [M47.816], Lumbar strain, initial encounter [S39.012A]   Evaluation Date: 11/29/2023  Authorization Period Expiration: 11/26/24  Plan of Care Expiration: 2/29/24  Progress Note Due: 2/10/2024  Date of Surgery: N/A  Visit # / Visits authorized: 5/ 20  FOTO: 1/ 3     Precautions: Standard      Time In: 1000 am  Time Out: 1048 am  Total Billable Time: 48 minutes    PTA Visit #: 0/5       Subjective     Patient reports: a few weeks ago, she was moving boxes around and she woke a few days later with a return of the pain that she was initially referred to therapy for. She tried to take the same meds but this time the meds did not help as much.  She has been taking it easy and on some steroids to try and calm everything. Today feels like a better today  She was compliant with home exercise program.  Response to previous treatment: tolerated well  Functional change: ongoing    Pain: 0 /10  Location: low back/right LE, R groin - pain with certain movements only    Objective      Lumbar Range of Motion:     Limitations Pain   Flexion 65%     This is what initiated the pain          Extension 0%    no         Left Side Bending 0% no         Right Side Bending 0% no           Lower Extremity Strength  Right LE   Left LE     Quadriceps: 5/5 Quadriceps: 5/5  "  Hamstrings: 5/5 Hamstrings: 5/5   Iliospoas: 4/5 Iliospoas: 4/5   Hip extension:  4/5 4+/5 Hip extension: 4/5 4+/5   PGM: 4-/5 PGM: 4-/5   Hip ER:  4-/5 4/5 Hip ER: 4/5 4+/5   Hip IR: 4-/5 4/5 Hip IR: 4/5 4+/5   Ankle dorsiflexion: 5/5 Ankle dorsiflexion: 5/5     Special Tests:  -SLR Test: positive R; negative L - min improvement on 1/10  -Repeated Flexion: pain not present currently  -Repeated Ext: pain not present currently   -Slump Test: negative bilaterally     Treatment     Barbara received the treatments listed below:      Bold = performed    therapeutic exercises to develop strength, endurance, ROM, flexibility, posture, and core stabilization for 28 minutes including:  Reassessment measures taken  Nu step 5'  LTR 2x10  Hamstring string stretch 3x30"  Supine sciatic nerve glide with ankle pump x20 B  Sidelying clamshells with red band 2x10 B  Bridges with red band 2x10  +seated ball rollouts 10x10"  Review of HEP    manual therapy techniques: Joint mobilizations, Manual traction, and Soft tissue Mobilization were applied for 00 minutes, including:   hypervolt to right calf  +short axis traction     neuromuscular re-education activities to improve: Coordination, Kinesthetic, Sense, and Proprioception for 20 minutes. The following activities were included:  Posterior pelvic tilt 20x3"  TrA activation with orange physioball 20x3"  Hip adduction isometrics with ball 20x3"  Hip abduction isometrics with pilates ring 20x3"  Standing marching with pressing into ball X 20     therapeutic activities to improve functional performance for 0  minutes, including:    Patient Education and Home Exercises       Education provided:   - HEP  - Possibility of delayed onset muscle soreness from starting new exercises    Written Home Exercises Provided: Patient instructed to cont prior HEP. Exercises were reviewed and Barbara was able to demonstrate them prior to the end of the session.  Barbara demonstrated good  understanding of the " education provided. See Electronic Medical Record under Patient Instructions for exercises provided during therapy sessions    Assessment     Barbara returns to therapy with a reduction in back pain but had an increase in right leg fatigue and slight increase in referring pain into the right leg after exercises today. Her back held up ok and she was given handouts of her previous home exercise program exercises to re-start at home    Barbara Is progressing well towards her goals.   Patient prognosis is Good.     Patient will continue to benefit from skilled outpatient physical therapy to address the deficits listed in the problem list box on initial evaluation, provide pt/family education and to maximize pt's level of independence in the home and community environment.     Patient's spiritual, cultural and educational needs considered and pt agreeable to plan of care and goals.     Anticipated barriers to physical therapy: none    GOALS: (not met, progressing unless otherwise specified)  Short Term Goals:  4 weeks - remains in progress  1.Report decreased right sided back and leg pain  < / =  4/10 at worst to increase tolerance for sleeping  2. Increase lumbar flexion range of motion to 0% limited in order to perform ADLs without difficulty.  3. Increase strength by 1/3 MMT grade in bilateral lower extremity to increase tolerance for ADL and work activities.  4. Pt to tolerate HEP to improve ROM and independence with ADL's     Long Term Goals: 8 weeks - remains in progress  1.Report decreased right sided back and leg pain < / = 2/10  to increase tolerance for ADLs  3.Increase strength to 4+/5 in  bilateral lower extremity to increase tolerance for ADL and work activities.  4. Pt will report at CJ level (20-40%) on FOTO  to demonstrate increase in LE function with every day tasks.      Plan     Continue to progress per PT POC    Nunu Hood, PT

## 2024-01-16 NOTE — PROGRESS NOTES
[facility]   Physical Therapy Treatment Note /Reassessment Note     Name: Anette Willis  Clinic Number: [id number]    Therapy Diagnosis:   No diagnosis found.        Physician: Ruth Johnson NP    Visit Date: 1/17/2024    Physician Orders: PT Eval and Treat   Medical Diagnosis from Referral: DDD (degenerative disc disease), lumbar [M51.36], Spondylosis of lumbar region without myelopathy or radiculopathy [M47.816], Lumbar strain, initial encounter [S39.012A]   Evaluation Date: 11/29/2023  Authorization Period Expiration: 11/26/24  Plan of Care Expiration: 2/29/24  Progress Note Due: 2/10/2024  Date of Surgery: N/A  Visit # / Visits authorized: 5/ 20  FOTO: 1/ 3     Precautions: Standard      Time In: ***  Time Out: ***  Total Billable Time: *** minutes    PTA Visit #: 0/5       Subjective     Patient reports: a few weeks ago, she was moving boxes around and she woke a few days later with a return of the pain that she was initially referred to therapy for. She tried to take the same meds but this time the meds did not help as much.  She has been taking it easy and on some steroids to try and calm everything. Today feels like a better today  She was compliant with home exercise program.  Response to previous treatment: tolerated well  Functional change: ongoing    Pain: 0 /10  Location: low back/right LE, R groin - pain with certain movements only    Objective      Lumbar Range of Motion:     Limitations Pain   Flexion 65%     This is what initiated the pain          Extension 0%    no         Left Side Bending 0% no         Right Side Bending 0% no           Lower Extremity Strength  Right LE   Left LE     Quadriceps: 5/5 Quadriceps: 5/5   Hamstrings: 5/5 Hamstrings: 5/5   Iliospoas: 4/5 Iliospoas: 4/5   Hip extension:  4/5 4+/5 Hip extension: 4/5 4+/5   PGM: 4-/5 PGM: 4-/5   Hip ER:  4-/5 4/5 Hip ER: 4/5 4+/5   Hip IR: 4-/5 4/5 Hip IR: 4/5 4+/5   Ankle dorsiflexion: 5/5 Ankle  "dorsiflexion: 5/5     Special Tests:  -SLR Test: positive R; negative L - min improvement on 1/10  -Repeated Flexion: pain not present currently  -Repeated Ext: pain not present currently   -Slump Test: negative bilaterally     Treatment     Barbara received the treatments listed below:      Bold = performed    therapeutic exercises to develop strength, endurance, ROM, flexibility, posture, and core stabilization for *** minutes including:  Reassessment measures taken  Nu step 5'  LTR 2x10  Hamstring string stretch 3x30"  Supine sciatic nerve glide with ankle pump x20 B  Sidelying clamshells with red band 2x10 B  Bridges with red band 2x10  +seated ball rollouts 10x10"  Review of HEP    manual therapy techniques: Joint mobilizations, Manual traction, and Soft tissue Mobilization were applied for 00 minutes, including:   hypervolt to right calf  +short axis traction     neuromuscular re-education activities to improve: Coordination, Kinesthetic, Sense, and Proprioception for *** minutes. The following activities were included:  Posterior pelvic tilt 20x3"  TrA activation with orange physioball 20x3"  Hip adduction isometrics with ball 20x3"  Hip abduction isometrics with pilates ring 20x3"  Standing marching with pressing into ball X 20     therapeutic activities to improve functional performance for 0  minutes, including:    Patient Education and Home Exercises       Education provided:   - HEP  - Possibility of delayed onset muscle soreness from starting new exercises    Written Home Exercises Provided: Patient instructed to cont prior HEP. Exercises were reviewed and Barbara was able to demonstrate them prior to the end of the session.  Barbara demonstrated good  understanding of the education provided. See Electronic Medical Record under Patient Instructions for exercises provided during therapy sessions    Assessment     Barbara returns to therapy with a reduction in back pain but had an increase in right leg fatigue and " slight increase in referring pain into the right leg after exercises today. Her back held up ok and she was given handouts of her previous home exercise program exercises to re-start at home    Barbara Is progressing well towards her goals.   Patient prognosis is Good.     Patient will continue to benefit from skilled outpatient physical therapy to address the deficits listed in the problem list box on initial evaluation, provide pt/family education and to maximize pt's level of independence in the home and community environment.     Patient's spiritual, cultural and educational needs considered and pt agreeable to plan of care and goals.     Anticipated barriers to physical therapy: none    GOALS: (not met, progressing unless otherwise specified)  Short Term Goals:  4 weeks - remains in progress  1.Report decreased right sided back and leg pain  < / =  4/10 at worst to increase tolerance for sleeping  2. Increase lumbar flexion range of motion to 0% limited in order to perform ADLs without difficulty.  3. Increase strength by 1/3 MMT grade in bilateral lower extremity to increase tolerance for ADL and work activities.  4. Pt to tolerate HEP to improve ROM and independence with ADL's     Long Term Goals: 8 weeks - remains in progress  1.Report decreased right sided back and leg pain < / = 2/10  to increase tolerance for ADLs  3.Increase strength to 4+/5 in  bilateral lower extremity to increase tolerance for ADL and work activities.  4. Pt will report at CJ level (20-40%) on FOTO  to demonstrate increase in LE function with every day tasks.      Plan     Continue to progress per PT POC    Brittni Guzman, PT

## 2024-01-17 ENCOUNTER — CLINICAL SUPPORT (OUTPATIENT)
Dept: REHABILITATION | Facility: HOSPITAL | Age: 79
End: 2024-01-17
Payer: MEDICARE

## 2024-01-17 DIAGNOSIS — M47.816 SPONDYLOSIS OF LUMBAR REGION WITHOUT MYELOPATHY OR RADICULOPATHY: ICD-10-CM

## 2024-01-17 DIAGNOSIS — Z74.09 IMPAIRED FUNCTIONAL MOBILITY, BALANCE, GAIT, AND ENDURANCE: Primary | ICD-10-CM

## 2024-01-17 DIAGNOSIS — M51.36 DDD (DEGENERATIVE DISC DISEASE), LUMBAR: ICD-10-CM

## 2024-01-17 DIAGNOSIS — S39.012A LUMBAR STRAIN, INITIAL ENCOUNTER: ICD-10-CM

## 2024-01-17 PROCEDURE — 97112 NEUROMUSCULAR REEDUCATION: CPT | Mod: PO,CQ

## 2024-01-17 PROCEDURE — 97530 THERAPEUTIC ACTIVITIES: CPT | Mod: PO,CQ

## 2024-01-17 PROCEDURE — 97110 THERAPEUTIC EXERCISES: CPT | Mod: PO,CQ

## 2024-01-17 NOTE — PROGRESS NOTES
"[facility]   Physical Therapy Treatment Note     Name: Anette Willis  Clinic Number: [id number]    Therapy Diagnosis:   Encounter Diagnoses   Name Primary?    Impaired functional mobility, balance, gait, and endurance Yes    DDD (degenerative disc disease), lumbar     Spondylosis of lumbar region without myelopathy or radiculopathy     Lumbar strain, initial encounter            Physician: Ruth Johnson NP    Visit Date: 1/17/2024    Physician Orders: PT Eval and Treat   Medical Diagnosis from Referral: DDD (degenerative disc disease), lumbar [M51.36], Spondylosis of lumbar region without myelopathy or radiculopathy [M47.816], Lumbar strain, initial encounter [S39.012A]   Evaluation Date: 11/29/2023  Authorization Period Expiration: 11/26/24  Plan of Care Expiration: 2/29/24  Progress Note Due: 2/10/2024  Date of Surgery: N/A  Visit # / Visits authorized: 2/ 80   FOTO: 1/ 3     Precautions: Standard      Time In: 9:52 am  Time Out: 10:48 am  Total Billable Time: 56 minutes    PTA Visit #: 1/5       Subjective     Patient reports: the exercises have been getting easier and she would like to progress so she can return to lifting things  She was compliant with home exercise program.  Response to previous treatment: tolerated well  Functional change: ongoing    Pain: 0 /10  Location: low back/right LE, R groin - pain with certain movements only    Objective      No objective measures taken    Treatment     Barbara received the treatments listed below:      Bold = performed    therapeutic exercises to develop strength, endurance, ROM, flexibility, posture, and core stabilization for 30 minutes including:  Reassessment measures taken  Nu step 5'  LTR 2x10  Hamstring string stretch 3x30"  Supine sciatic nerve glide with ankle pump x20 B  Sidelying clamshells with +green band 2x10 B  Bridges with +green band 2x10  +seated ball rollouts 10x10"  Review of HEP    manual therapy techniques: Joint " "mobilizations, Manual traction, and Soft tissue Mobilization were applied for 00 minutes, including:   hypervolt to right calf  +short axis traction     neuromuscular re-education activities to improve: Coordination, Kinesthetic, Sense, and Proprioception for 16 minutes. The following activities were included:  Posterior pelvic tilt 20x3"  TrA activation with orange physioball +alternating LE 20x3"  Hip adduction isometrics with ball with glute squeeze 20x3"  Hip abduction isometrics with pilates ring 20x3"  Standing marching with pressing into ball X 20  +Standing hip abduction pressing into ball x20     therapeutic activities to improve functional performance for 10  minutes, including:  +hip hinging with dowel x10  +sit to stands with dowel for hip hinge x10  +shuttle double leg press 2 bands x10      Patient Education and Home Exercises       Education provided:   - HEP  - Possibility of delayed onset muscle soreness from starting new exercises    Written Home Exercises Provided: Patient instructed to cont prior HEP. Exercises were reviewed and Barbara was able to demonstrate them prior to the end of the session.  Barbara demonstrated good  understanding of the education provided. See Electronic Medical Record under Patient Instructions for exercises provided during therapy sessions    Assessment     Barbara continues with fear of pain and is hesitant to progress, but reports wanting to progress with functional activities to return to lifting objects at home. Added functional activities as noted above with cueing for proper technique and core activation. She stopped shuttle leg press after 10 reps due to fear of "overdoing it." She had no increase in pain following treatment session. She was educated on possibility of delayed onset muscle soreness. HEP was updated with new exercises. Continue to progress as tolerated.     Barbara Is progressing well towards her goals.   Patient prognosis is Good.     Patient will continue " to benefit from skilled outpatient physical therapy to address the deficits listed in the problem list box on initial evaluation, provide pt/family education and to maximize pt's level of independence in the home and community environment.     Patient's spiritual, cultural and educational needs considered and pt agreeable to plan of care and goals.     Anticipated barriers to physical therapy: none    GOALS: (not met, progressing unless otherwise specified)  Short Term Goals:  4 weeks - remains in progress  1.Report decreased right sided back and leg pain  < / =  4/10 at worst to increase tolerance for sleeping  2. Increase lumbar flexion range of motion to 0% limited in order to perform ADLs without difficulty.  3. Increase strength by 1/3 MMT grade in bilateral lower extremity to increase tolerance for ADL and work activities.  4. Pt to tolerate HEP to improve ROM and independence with ADL's     Long Term Goals: 8 weeks - remains in progress  1.Report decreased right sided back and leg pain < / = 2/10  to increase tolerance for ADLs  3.Increase strength to 4+/5 in  bilateral lower extremity to increase tolerance for ADL and work activities.  4. Pt will report at CJ level (20-40%) on FOTO  to demonstrate increase in LE function with every day tasks.      Plan     Continue to progress per PT JANICE Chapman, TYREE

## 2024-01-18 RX ORDER — MELOXICAM 7.5 MG/1
7.5 TABLET ORAL DAILY PRN
Qty: 30 TABLET | Refills: 0 | Status: SHIPPED | OUTPATIENT
Start: 2024-01-18 | End: 2024-01-24

## 2024-01-18 NOTE — TELEPHONE ENCOUNTER
Care Due:                  Date            Visit Type   Department     Provider  --------------------------------------------------------------------------------                                ESTABLISHED                              PATIENT -    [facility]  Last Visit: 01-      [facility]           Simon Hernandez  Next Visit: None Scheduled  None         None Found                                                            Last  Test          Frequency    Reason                     Performed    Due Date  --------------------------------------------------------------------------------    CBC.........  12 months..  meloxicam................  03-   03-    CMP.........  12 months..  losartan, meloxicam,       03-   03-                             rosuvastatin.............    Lipid Panel.  12 months..  rosuvastatin.............  03-   03-    TSH.........  12 months..  levothyroxine............  03- 03-    [facility] Embedded Care Due Messages. Reference number: [id number].   1/18/2024 12:30:07 AM CST

## 2024-01-23 NOTE — PROGRESS NOTES
PCP - Simon Hernandez MD  Referring Physician: Dr. Hernandez    Subjective:   Patient ID:  Anette Willis is a 78 y.o. female with past medical history of:   SVT  Sciatica    Who presents for new patient evaluation for SVT. Patient reports that she gets palpitations 1-2 times per week every couple of months. Her episodes are not brought on by anything in particular but she believes dehydration may play a role. She was following with Cardiology in [place] before she moved to [place] and was being treated for SVT with metoprolol tartrate. She reports no prior history of ablation and has never  been seen by EP. Regarding stimulants, she drinks one cup of coffee every morning but denies any other source of caffeine. She has hypothyroidism and is on synthroid. Free T4 level was normal in March. Denies chest pain, shortness of breath, syncope, exertional symptoms. EKG showed sinus rhythm in clinic today.    History:     Social History     Tobacco Use    Smoking status: Never     Passive exposure: Never    Smokeless tobacco: Never   Substance Use Topics    Alcohol use: Not on file     No family history on file.    Meds:   Review of patient's allergies indicates:  No Known Allergies    Current Outpatient Medications:     levothyroxine (SYNTHROID) 75 MCG tablet, Take 1 tablet (75 mcg total) by mouth every morning., Disp: 90 tablet, Rfl: 1    losartan (COZAAR) 100 MG tablet, Take 1 tablet (100 mg total) by mouth once daily., Disp: 90 tablet, Rfl: 1    rosuvastatin (CRESTOR) 10 MG tablet, Take 1 tablet (10 mg total) by mouth once daily., Disp: 90 tablet, Rfl: 1    methocarbamoL (ROBAXIN) 500 MG Tab, Take 1 tablet (500 mg total) by mouth 3 (three) times daily as needed (90). (Patient not taking: Reported on 1/24/2024), Disp: 90 tablet, Rfl: 3    metoprolol succinate (TOPROL-XL) 100 MG 24 hr tablet, Take 1 tablet (100 mg total) by mouth 2 (two) times daily., Disp: 60 tablet, Rfl: 11      Objective:   /63   Pulse 73   " Ht 5' 6" (1.676 m)   Wt 73.3 kg (161 lb 9.6 oz)   SpO2 98%   BMI 26.08 kg/m²     Physical Exam  Gen: No apparent distress, resting comfortably  HEENT: Pupils equal and reactive to light  Cardio: Regular rate, point of maximal impulse not displaced, no murmur noted, 2+ radial pulses bilaterally, 2+ DP pulses bilaterally  Resp: CTAB, no wheezing  Abd: Soft, non-tender, non-distended  Skin: Warm, dry, no peripheral edema noted  Neuro: Alert and oriented x3  Psych: Normal mood and affect      Labs:     Lab Results   Component Value Date     03/10/2023    K 4.6 03/10/2023     03/10/2023    CO2 22 (L) 03/10/2023    BUN 17 03/10/2023    CREATININE 0.9 03/10/2023    ANIONGAP 9 03/10/2023     No results found for: "HGBA1C"  No results found for: "BNP", "BNPTRIAGEBLO"    Lab Results   Component Value Date    WBC 7.19 03/10/2023    HGB 13.4 03/10/2023    HCT 43.3 03/10/2023     03/10/2023    GRAN 4.1 03/10/2023    GRAN 56.5 03/10/2023     Lab Results   Component Value Date    CHOL 148 03/10/2023    HDL 48 03/10/2023    LDLCALC 82.4 03/10/2023    TRIG 88 03/10/2023       Lab Results   Component Value Date     03/10/2023    K 4.6 03/10/2023     03/10/2023    CO2 22 (L) 03/10/2023    BUN 17 03/10/2023    CREATININE 0.9 03/10/2023    ANIONGAP 9 03/10/2023     No results found for: "HGBA1C"  No results found for: "BNP", "BNPTRIAGEBLO" Lab Results   Component Value Date    WBC 7.19 03/10/2023    HGB 13.4 03/10/2023    HCT 43.3 03/10/2023     03/10/2023    GRAN 4.1 03/10/2023    GRAN 56.5 03/10/2023     Lab Results   Component Value Date    CHOL 148 03/10/2023    HDL 48 03/10/2023    LDLCALC 82.4 03/10/2023    TRIG 88 03/10/2023                Cardiovascular Imaging:     Echo: No results found for: "EF"    Stress test:     [facility]:    Assessment & Plan:     SVT (supraventricular tachycardia)  -Diagnosed in [place] by Dr. Behairy  -No syncopal episodes  -She reports symptoms every couple of weeks " that are aborted by Lopressor 50 mg  -Only drinks one cup of coffee in the morning, no other stimulant use  -Hypothyroid but on synthroid and T4 normal in March  -Will order 30 day event monitor and Echo  -Change metoprolol to metoprolol succinate 100 mg BID  -EKG today with sinus rhythm    Dyslipidemia  LDL at goal    HTN (hypertension)  -Continue metoprolol and losartan    Case staffed with Dr. ALLISON Richards. RTC in 6 months or sooner if needed.      Signed:  Arnav Dubois MD  [facility] PGY-6    Staff attestation to follow.

## 2024-01-24 ENCOUNTER — HOSPITAL ENCOUNTER (OUTPATIENT)
Dept: CARDIOLOGY | Facility: CLINIC | Age: 79
Discharge: HOME | End: 2024-01-24
Payer: MEDICARE

## 2024-01-24 ENCOUNTER — OFFICE VISIT (OUTPATIENT)
Dept: CARDIOLOGY | Facility: CLINIC | Age: 79
End: 2024-01-24
Payer: MEDICARE

## 2024-01-24 VITALS
WEIGHT: 161.63 LBS | DIASTOLIC BLOOD PRESSURE: 63 MMHG | SYSTOLIC BLOOD PRESSURE: 135 MMHG | BODY MASS INDEX: 25.97 KG/M2 | HEART RATE: 73 BPM | OXYGEN SATURATION: 98 % | HEIGHT: 66 IN

## 2024-01-24 DIAGNOSIS — I47.10 SVT (SUPRAVENTRICULAR TACHYCARDIA): Primary | ICD-10-CM

## 2024-01-24 DIAGNOSIS — R00.2 PALPITATIONS: ICD-10-CM

## 2024-01-24 DIAGNOSIS — Z00.00 PHYSICAL EXAM: ICD-10-CM

## 2024-01-24 DIAGNOSIS — I48.0 PAROXYSMAL ATRIAL FIBRILLATION: ICD-10-CM

## 2024-01-24 DIAGNOSIS — I10 HYPERTENSION, UNSPECIFIED TYPE: ICD-10-CM

## 2024-01-24 DIAGNOSIS — I48.0 PAROXYSMAL ATRIAL FIBRILLATION: Primary | ICD-10-CM

## 2024-01-24 DIAGNOSIS — E78.5 DYSLIPIDEMIA: ICD-10-CM

## 2024-01-24 PROCEDURE — 99999 PR PBB SHADOW E&M-EST. PATIENT-LVL III: CPT | Mod: PBBFAC,GC,, | Performed by: STUDENT IN AN ORGANIZED HEALTH CARE EDUCATION/TRAINING PROGRAM

## 2024-01-24 PROCEDURE — 93005 ELECTROCARDIOGRAM TRACING: CPT | Mod: PBBFAC | Performed by: INTERNAL MEDICINE

## 2024-01-24 PROCEDURE — 99204 OFFICE O/P NEW MOD 45 MIN: CPT | Mod: S$PBB,25,GC, | Performed by: STUDENT IN AN ORGANIZED HEALTH CARE EDUCATION/TRAINING PROGRAM

## 2024-01-24 PROCEDURE — 93010 ELECTROCARDIOGRAM REPORT: CPT | Mod: S$PBB,,, | Performed by: INTERNAL MEDICINE

## 2024-01-24 PROCEDURE — 99213 OFFICE O/P EST LOW 20 MIN: CPT | Mod: PBBFAC | Performed by: STUDENT IN AN ORGANIZED HEALTH CARE EDUCATION/TRAINING PROGRAM

## 2024-01-24 RX ORDER — METOPROLOL SUCCINATE 100 MG/1
100 TABLET, EXTENDED RELEASE ORAL 2 TIMES DAILY
Qty: 60 TABLET | Refills: 11 | Status: SHIPPED | OUTPATIENT
Start: 2024-01-24 | End: 2024-04-18

## 2024-01-24 NOTE — ASSESSMENT & PLAN NOTE
-Diagnosed in [place] by Dr. Behairy  -No syncopal episodes  -She reports symptoms every couple of weeks that are aborted by Lopressor 50 mg  -Only drinks one cup of coffee in the morning, no other stimulant use  -Hypothyroid but on synthroid and T4 normal in March  -Will order 30 day event monitor and Echo  -Change metoprolol to metoprolol succinate 100 mg BID  -EKG today with sinus rhythm

## 2024-01-31 ENCOUNTER — CLINICAL SUPPORT (OUTPATIENT)
Dept: REHABILITATION | Facility: HOSPITAL | Age: 79
End: 2024-01-31
Payer: MEDICARE

## 2024-01-31 DIAGNOSIS — S39.012A LUMBAR STRAIN, INITIAL ENCOUNTER: ICD-10-CM

## 2024-01-31 DIAGNOSIS — M47.816 SPONDYLOSIS OF LUMBAR REGION WITHOUT MYELOPATHY OR RADICULOPATHY: ICD-10-CM

## 2024-01-31 DIAGNOSIS — Z74.09 IMPAIRED FUNCTIONAL MOBILITY, BALANCE, GAIT, AND ENDURANCE: Primary | ICD-10-CM

## 2024-01-31 DIAGNOSIS — M51.36 DDD (DEGENERATIVE DISC DISEASE), LUMBAR: ICD-10-CM

## 2024-01-31 PROCEDURE — 97112 NEUROMUSCULAR REEDUCATION: CPT | Mod: PO,CQ

## 2024-01-31 PROCEDURE — 97530 THERAPEUTIC ACTIVITIES: CPT | Mod: PO,CQ

## 2024-01-31 NOTE — PROGRESS NOTES
"[facility]   Physical Therapy Treatment Note     Name: Anette Willis  Clinic Number: [id number]    Therapy Diagnosis:   Encounter Diagnoses   Name Primary?    Impaired functional mobility, balance, gait, and endurance Yes    DDD (degenerative disc disease), lumbar     Spondylosis of lumbar region without myelopathy or radiculopathy     Lumbar strain, initial encounter              Physician: Ruth Johnson NP    Visit Date: 1/31/2024    Physician Orders: PT Eval and Treat   Medical Diagnosis from Referral: DDD (degenerative disc disease), lumbar [M51.36], Spondylosis of lumbar region without myelopathy or radiculopathy [M47.816], Lumbar strain, initial encounter [S39.012A]   Evaluation Date: 11/29/2023  Authorization Period Expiration: 11/26/24  Plan of Care Expiration: 2/29/24  Progress Note Due: 2/10/2024  Date of Surgery: N/A  Visit # / Visits authorized: 3/ 80   FOTO: 1/ 3     Precautions: Standard      Time In: 10:00 am  Time Out: 10:46 am  Total Billable Time: 23 minutes one on one    PTA Visit #: 2/5       Subjective     Patient reports: she's had two set backs and is still concerned about having pain.   She was compliant with home exercise program.  Response to previous treatment: tolerated well  Functional change: ongoing    Pain: 0 /10  Location: low back/right LE, R groin - pain with certain movements only    Objective      No objective measures taken    Treatment     Barbara received the treatments listed below:      Bold = performed    therapeutic exercises to develop strength, endurance, ROM, flexibility, posture, and core stabilization for 20 minutes including:  Reassessment measures taken  Nu step 5'  LTR 2x10  Hamstring string stretch 3x30"  Supine sciatic nerve glide with ankle pump x20 B  Sidelying clamshells with green band 2x10 B  Bridges with green band 2x10  +seated ball rollouts 10x10"  Review of HEP    manual therapy techniques: Joint mobilizations, Manual " "traction, and Soft tissue Mobilization were applied for 00 minutes, including:   hypervolt to right calf  +short axis traction     neuromuscular re-education activities to improve: Coordination, Kinesthetic, Sense, and Proprioception for 16 minutes. The following activities were included:  Posterior pelvic tilt 20x3"  TrA activation with orange physioball alternating LE 20x3"  Hip adduction isometrics with ball with glute squeeze 20x3"  Hip abduction isometrics with pilates ring 20x3"  Standing marching with pressing into ball X 20  Standing hip abduction pressing into ball x20     therapeutic activities to improve functional performance for 10 minutes, including:  hip hinging with dowel x10  sit to stands with dowel for hip hinge x10  shuttle double leg press 2 bands x10  +Dead lifts with dowel 2x10      Patient Education and Home Exercises       Education provided:   - HEP  - Possibility of delayed onset muscle soreness from starting new exercises    Written Home Exercises Provided: Patient instructed to cont prior HEP. Exercises were reviewed and Barbara was able to demonstrate them prior to the end of the session.  Barbara demonstrated good  understanding of the education provided. See Electronic Medical Record under Patient Instructions for exercises provided during therapy sessions    Assessment     Barbara continues with fear of pain, but was agreeable to progressions today. She was instructed on dead lift using the dowel with cueing required for proper hip hinging and technique. Continue to progress as tolerated with functional activities.      Barbara Is progressing well towards her goals.   Patient prognosis is Good.     Patient will continue to benefit from skilled outpatient physical therapy to address the deficits listed in the problem list box on initial evaluation, provide pt/family education and to maximize pt's level of independence in the home and community environment.     Patient's spiritual, cultural and " educational needs considered and pt agreeable to plan of care and goals.     Anticipated barriers to physical therapy: none    GOALS: (not met, progressing unless otherwise specified)  Short Term Goals:  4 weeks - remains in progress  1.Report decreased right sided back and leg pain  < / =  4/10 at worst to increase tolerance for sleeping  2. Increase lumbar flexion range of motion to 0% limited in order to perform ADLs without difficulty.  3. Increase strength by 1/3 MMT grade in bilateral lower extremity to increase tolerance for ADL and work activities.  4. Pt to tolerate HEP to improve ROM and independence with ADL's     Long Term Goals: 8 weeks - remains in progress  1.Report decreased right sided back and leg pain < / = 2/10  to increase tolerance for ADLs  3.Increase strength to 4+/5 in  bilateral lower extremity to increase tolerance for ADL and work activities.  4. Pt will report at CJ level (20-40%) on FOTO  to demonstrate increase in LE function with every day tasks.      Plan     Continue to progress per PT JANICE Chapman, PTA

## 2024-02-02 ENCOUNTER — APPOINTMENT (OUTPATIENT)
Dept: RADIOLOGY | Facility: CLINIC | Age: 79
End: 2024-02-02
Attending: INTERNAL MEDICINE
Payer: MEDICARE

## 2024-02-02 DIAGNOSIS — Z78.0 MENOPAUSE: ICD-10-CM

## 2024-02-02 PROCEDURE — 77080 DXA BONE DENSITY AXIAL: CPT | Mod: TC,PO

## 2024-02-02 PROCEDURE — 77080 DXA BONE DENSITY AXIAL: CPT | Mod: 26,,, | Performed by: INTERNAL MEDICINE

## 2024-02-05 ENCOUNTER — CLINICAL SUPPORT (OUTPATIENT)
Dept: CARDIOLOGY | Facility: HOSPITAL | Age: 79
End: 2024-02-05
Attending: STUDENT IN AN ORGANIZED HEALTH CARE EDUCATION/TRAINING PROGRAM
Payer: MEDICARE

## 2024-02-05 ENCOUNTER — HOSPITAL ENCOUNTER (OUTPATIENT)
Dept: CARDIOLOGY | Facility: HOSPITAL | Age: 79
Discharge: HOME | End: 2024-02-05
Attending: STUDENT IN AN ORGANIZED HEALTH CARE EDUCATION/TRAINING PROGRAM
Payer: MEDICARE

## 2024-02-05 VITALS
HEIGHT: 66 IN | DIASTOLIC BLOOD PRESSURE: 78 MMHG | HEART RATE: 75 BPM | BODY MASS INDEX: 25.88 KG/M2 | SYSTOLIC BLOOD PRESSURE: 126 MMHG | WEIGHT: 161 LBS

## 2024-02-05 DIAGNOSIS — I47.10 SVT (SUPRAVENTRICULAR TACHYCARDIA): ICD-10-CM

## 2024-02-05 DIAGNOSIS — R00.2 PALPITATIONS: ICD-10-CM

## 2024-02-05 LAB
ASCENDING AORTA: 2.79 CM
AV INDEX (PROSTH): 0.89
AV MEAN GRADIENT: 2 MMHG
AV PEAK GRADIENT: 4 MMHG
AV VALVE AREA BY VELOCITY RATIO: 2.07 CM²
AV VALVE AREA: 2.34 CM²
AV VELOCITY RATIO: 0.79
BSA FOR ECHO PROCEDURE: 1.84 M2
CV ECHO LV RWT: 0.46 CM
DOP CALC AO PEAK VEL: 1.03 M/S
DOP CALC AO VTI: 21.75 CM
DOP CALC LVOT AREA: 2.6 CM2
DOP CALC LVOT DIAMETER: 1.83 CM
DOP CALC LVOT PEAK VEL: 0.81 M/S
DOP CALC LVOT STROKE VOLUME: 50.79 CM3
DOP CALCLVOT PEAK VEL VTI: 19.32 CM
E WAVE DECELERATION TIME: 190.29 MSEC
E/A RATIO: 1.03
E/E' RATIO: 10 M/S
ECHO LV POSTERIOR WALL: 0.86 CM (ref 0.6–1.1)
EJECTION FRACTION: 65 %
FRACTIONAL SHORTENING: 33 % (ref 28–44)
INTERVENTRICULAR SEPTUM: 0.89 CM (ref 0.6–1.1)
LA MAJOR: 5.42 CM
LA MINOR: 5.27 CM
LA WIDTH: 3.74 CM
LEFT ATRIUM SIZE: 3.51 CM
LEFT ATRIUM VOLUME INDEX MOD: 36.7 ML/M2
LEFT ATRIUM VOLUME INDEX: 32.8 ML/M2
LEFT ATRIUM VOLUME MOD: 66.76 CM3
LEFT ATRIUM VOLUME: 59.63 CM3
LEFT INTERNAL DIMENSION IN SYSTOLE: 2.53 CM (ref 2.1–4)
LEFT VENTRICLE DIASTOLIC VOLUME INDEX: 33.58 ML/M2
LEFT VENTRICLE DIASTOLIC VOLUME: 61.11 ML
LEFT VENTRICLE MASS INDEX: 53 G/M2
LEFT VENTRICLE SYSTOLIC VOLUME INDEX: 12.6 ML/M2
LEFT VENTRICLE SYSTOLIC VOLUME: 22.98 ML
LEFT VENTRICULAR INTERNAL DIMENSION IN DIASTOLE: 3.78 CM (ref 3.5–6)
LEFT VENTRICULAR MASS: 96.37 G
LV LATERAL E/E' RATIO: 10 M/S
LV SEPTAL E/E' RATIO: 10 M/S
MV PEAK A VEL: 0.78 M/S
MV PEAK E VEL: 0.8 M/S
MV STENOSIS PRESSURE HALF TIME: 55.19 MS
MV VALVE AREA P 1/2 METHOD: 3.99 CM2
PISA TR MAX VEL: 2.27 M/S
RA MAJOR: 4.82 CM
RA PRESSURE ESTIMATED: 3 MMHG
RA WIDTH: 3.6 CM
RIGHT VENTRICULAR END-DIASTOLIC DIMENSION: 3.07 CM
RV TB RVSP: 5 MMHG
SINUS: 2.8 CM
STJ: 2.6 CM
TDI LATERAL: 0.08 M/S
TDI SEPTAL: 0.08 M/S
TDI: 0.08 M/S
TR MAX PG: 21 MMHG
TRICUSPID ANNULAR PLANE SYSTOLIC EXCURSION: 1.21 CM
TV REST PULMONARY ARTERY PRESSURE: 24 MMHG
Z-SCORE OF LEFT VENTRICULAR DIMENSION IN END DIASTOLE: -2.87
Z-SCORE OF LEFT VENTRICULAR DIMENSION IN END SYSTOLE: -1.64

## 2024-02-05 PROCEDURE — 93306 TTE W/DOPPLER COMPLETE: CPT | Mod: 26,,, | Performed by: INTERNAL MEDICINE

## 2024-02-05 PROCEDURE — 93272 ECG/REVIEW INTERPRET ONLY: CPT | Mod: ,,, | Performed by: INTERNAL MEDICINE

## 2024-02-05 PROCEDURE — C8929 TTE W OR WO FOL WCON,DOPPLER: HCPCS

## 2024-02-05 PROCEDURE — 93271 ECG/MONITORING AND ANALYSIS: CPT

## 2024-02-06 ENCOUNTER — TELEPHONE (OUTPATIENT)
Dept: ELECTROPHYSIOLOGY | Facility: CLINIC | Age: 79
End: 2024-02-06
Payer: MEDICARE

## 2024-02-06 DIAGNOSIS — I48.0 PAROXYSMAL ATRIAL FIBRILLATION: Primary | ICD-10-CM

## 2024-02-06 NOTE — PROGRESS NOTES
Called patient on the phone after receiving monitor alert of new onset atrial fibrillation (patient reported palpitations). Today at the time of our phone call her HR was 65 bpm on her AppleWatch and she was walking around [employer] without chest pain or shortness of breath. She has no significant bleeding history and denies ever falling at home. She has a CHADS-VaSC score of 4. Recent Echo shows normal EF with normal NUPUR. We discussed her new diagnosis of AF and her risk of stroke because of this. We decided to start her on Eliquis 5 mg BID and refer her to Electrophysiology. She was given precautions on when to go to ED (symptoms). She was also instructed to get head CT at ER if she ever falls and hits her head on blood thinner regardless of symptoms. Patient understanding of all.

## 2024-02-06 NOTE — TELEPHONE ENCOUNTER
Patient wearing 30 day event monitor for diagnosis SVT     Received auto-triggered alert notification for new onset AF on 2/5/24 at 2024.  V rate at 147bpm.        Called patient to assess for symptoms. States she felt an increase in HR.  Denies LH, dizzy, SOB, fatigue.      Confirmed patient taking Metoprolol 100mg BID    Strips placed under this encounter for review. Message sent to ordering provider. Will continue to monitor until 3/6/24

## 2024-02-16 ENCOUNTER — OFFICE VISIT (OUTPATIENT)
Dept: OPTOMETRY | Facility: CLINIC | Age: 79
End: 2024-02-16
Payer: MEDICARE

## 2024-02-16 DIAGNOSIS — H52.203 MYOPIA WITH ASTIGMATISM AND PRESBYOPIA, BILATERAL: ICD-10-CM

## 2024-02-16 DIAGNOSIS — H25.13 NUCLEAR SCLEROSIS OF BOTH EYES: ICD-10-CM

## 2024-02-16 DIAGNOSIS — H35.051 CHOROIDAL NEOVASCULAR MEMBRANE OF RIGHT EYE: Primary | ICD-10-CM

## 2024-02-16 DIAGNOSIS — H52.13 MYOPIA WITH ASTIGMATISM AND PRESBYOPIA, BILATERAL: ICD-10-CM

## 2024-02-16 DIAGNOSIS — H52.4 MYOPIA WITH ASTIGMATISM AND PRESBYOPIA, BILATERAL: ICD-10-CM

## 2024-02-16 PROCEDURE — 92004 COMPRE OPH EXAM NEW PT 1/>: CPT | Mod: S$PBB,,, | Performed by: OPTOMETRIST

## 2024-02-16 PROCEDURE — 99999 PR PBB SHADOW E&M-EST. PATIENT-LVL II: CPT | Mod: PBBFAC,,, | Performed by: OPTOMETRIST

## 2024-02-16 PROCEDURE — 92015 DETERMINE REFRACTIVE STATE: CPT | Mod: ,,, | Performed by: OPTOMETRIST

## 2024-02-16 PROCEDURE — 99212 OFFICE O/P EST SF 10 MIN: CPT | Mod: PBBFAC | Performed by: OPTOMETRIST

## 2024-02-16 NOTE — PROGRESS NOTES
HPI    CC: Routine Eye Exam    NEHEMIAS: 2 years ago    (+) Changes in vision: At distance and near  (-) Pain  (-) Irritation   (-) Itching   (-) Flashes  (-) Floaters  (+) Glasses wearer  (+) CL wearer: Patient is wearing gas permeable contact lens  (-) Uses eye gtts    Does patient want a refraction today? Yes    (-) Eye injury  (-) Eye surgery   (+)POHx: Patient states she has the beginning of macular degeneration.   States she takes a vitamin BID.  (+)FOHx: Mother had Macular Degeneration and Cataracts    (-)DM         Last edited by Shelby Lynn, OD on 2/16/2024  3:45 PM.            Assessment /Plan     For exam results, see Encounter Report.    Choroidal neovascular membrane of right eye    Nuclear sclerosis of both eyes    Myopia with astigmatism and presbyopia, bilateral      Educated pt on findings. CNVM OD. Previously Dx with AMD? May be another etiology since monocular and minimal drusen. Refer to Dr. Ugarte for further eval.     2. Educated pt on findings. Not visually significant. No need for removal at this time. Monitor yearly.      3. Updated SRx. Monitor yearly.    Pt wears RGPs. Will refer to Dr. Ratliff for CL fit once she follows-up with retina. Monitor.       RTC with Dr. Ugarte as directed, me prn.

## 2024-02-19 NOTE — PROGRESS NOTES
[facility]   Physical Therapy Treatment Note/ Reassessment/ Updated Plan of Care     Name: Anette Willis  Clinic Number: [id number]    Therapy Diagnosis:   Encounter Diagnoses   Name Primary?    Impaired functional mobility, balance, gait, and endurance Yes    DDD (degenerative disc disease), lumbar     Spondylosis of lumbar region without myelopathy or radiculopathy     Lumbar strain, initial encounter                Physician: Ruth Johnson NP    Visit Date: 2/20/2024    Physician Orders: PT Eval and Treat   Medical Diagnosis from Referral: DDD (degenerative disc disease), lumbar [M51.36], Spondylosis of lumbar region without myelopathy or radiculopathy [M47.816], Lumbar strain, initial encounter [S39.012A]   Evaluation Date: 11/29/2023  Authorization Period Expiration: 11/26/24  Plan of Care Expiration: 5/20/24  Progress Note Due: 3/20/24  Date of Surgery: N/A  Visit # / Visits authorized: 3/ 80   FOTO: 1/ 3     Precautions: Standard      Time In: 10:00  Time Out: 10:52  Total Billable Time: 25 minutes    PTA Visit #: 0/5       Subjective     Patient reports: pain began again a few weeks ago o she has not done exercises out of fear. IF she can find the right position then she will be ok when she is sitting. She is using a cane for ambulation   She was compliant with home exercise program.  Response to previous treatment: tolerated well  Functional change: ongoing    Pain: 0 /10  Location: low back/right LE, R groin - pain with certain movements only    Objective      Observation: enters clinic ambulating independently with single point cane      Posture:  forward head, rounded shoulders     Lumbar Range of Motion:     Limitations Pain   Flexion Unable to assess    This is what initiated the pain          Extension 0%    no         Left Side Bending 0% no         Right Side Bending 0% no            Rotation: full and pain free bilaterally      Lower Extremity Strength  Right LE    "Left LE     Quadriceps: 5/5 Quadriceps: 5/5   Hamstrings: 5/5 Hamstrings: 5/5   Iliospoas: 4/5 Iliospoas: 4/5   Hip extension:  4/5 Hip extension: 4/5   PGM: 4-/5 PGM: 4-/5   Hip ER:  4-/5 Hip ER: 4-/5   Hip IR: 4-/5 Hip IR: 4-/5   Ankle dorsiflexion: 5/5 Ankle dorsiflexion: 5/5         Special Tests:  -SLR Test: positive R; negative L   -Repeated Flexion: pain not present currently  -Repeated Ext: pain not present currently   -Slump Test: negative bilaterally         Joint Mobility: hypomobile lumbar segmental mobility        Palpation: tenderness at posterior greater trochanter        Flexibility:      -Hamstring : R = mod limitation ; L = mod limitation     Jagdish Test Right  Left    Iliopsoas - -   Rectus Femoris  - -               Intake Outcome Measure for FOTO lumbar Survey     Therapist reviewed FOTO scores for Anette Willis on 2/20/24   FOTO report - see Media section or FOTO account episode details.     Intake Score: 32%           Treatment     Barbara received the treatments listed below:      Bold = performed    therapeutic exercises to develop strength, endurance, ROM, flexibility, posture, and core stabilization for 32 minutes including:  Reassessment measures taken  Nu step 5'  LTR 2x10  Hamstring string stretch 3x30"  Supine sciatic nerve glide with ankle pump x20 B  Sidelying clamshells with green band 2x10 B  Bridges with green band 2x10  seated ball rollouts 10x10"  Review of HEP    manual therapy techniques: Joint mobilizations, Manual traction, and Soft tissue Mobilization were applied for 00 minutes, including:   hypervolt to right calf  +short axis traction     neuromuscular re-education activities to improve: Coordination, Kinesthetic, Sense, and Proprioception for 20 minutes. The following activities were included:  Posterior pelvic tilt 20x3"  TrA activation with orange physioball alternating LE 20x3"  Hip adduction isometrics with ball with glute squeeze 30x3"  Hip abduction isometrics with " "pilates ring 30x3"  +seated sciatic nerve glides x20 B   Standing marching with pressing into ball X 20  Standing hip abduction pressing into ball x20     therapeutic activities to improve functional performance for 0 minutes, including:  hip hinging with dowel x10  sit to stands with dowel for hip hinge x10  shuttle double leg press 2 bands x10  +Dead lifts with dowel 2x10      Patient Education and Home Exercises       Education provided:   - HEP  - Possibility of delayed onset muscle soreness from starting new exercises    Written Home Exercises Provided: Patient instructed to cont prior HEP. Exercises were reviewed and Barbara was able to demonstrate them prior to the end of the session.  Barbara demonstrated good  understanding of the education provided. See Electronic Medical Record under Patient Instructions for exercises provided during therapy sessions    Assessment      Barbara presents today with repeat onset of pain after leaning over to move items across the floor. As a result, she ceased all PT exercises out of fear of increasing her pain. She reports she did not want to add insult to injury. Discussed today that the goal of her exercises is to reduce pain, improve core strength and mobility, and reduce instances of new onset pain and therefore she should continue with them as prescribed. We returned to initial table based exercises and will progress as tolerated from here. Reassessment reveals no improvement in strength likely as a result of return of pain. No goals met at this time.     Barbara Is progressing well towards her goals.   Patient prognosis is Good.     Patient will continue to benefit from skilled outpatient physical therapy to address the deficits listed in the problem list box on initial evaluation, provide pt/family education and to maximize pt's level of independence in the home and community environment.     Patient's spiritual, cultural and educational needs considered and pt agreeable to plan " of care and goals.     Anticipated barriers to physical therapy: none    GOALS: (not met, progressing unless otherwise specified)  Short Term Goals:  4 weeks - remains in progress  1.Report decreased right sided back and leg pain  < / =  4/10 at worst to increase tolerance for sleeping  2. Increase lumbar flexion range of motion to 0% limited in order to perform ADLs without difficulty.  3. Increase strength by 1/3 MMT grade in bilateral lower extremity to increase tolerance for ADL and work activities.  4. Pt to tolerate HEP to improve ROM and independence with ADL's     Long Term Goals: 8 weeks - remains in progress  1.Report decreased right sided back and leg pain < / = 2/10  to increase tolerance for ADLs  3.Increase strength to 4+/5 in  bilateral lower extremity to increase tolerance for ADL and work activities.  4. Pt will report at CJ level (20-40%) on FOTO  to demonstrate increase in LE function with every day tasks.      Plan     Continue to progress per PT POC    Brittni Guzman, PT

## 2024-02-20 ENCOUNTER — CLINICAL SUPPORT (OUTPATIENT)
Dept: REHABILITATION | Facility: HOSPITAL | Age: 79
End: 2024-02-20
Payer: MEDICARE

## 2024-02-20 DIAGNOSIS — S39.012A LUMBAR STRAIN, INITIAL ENCOUNTER: ICD-10-CM

## 2024-02-20 DIAGNOSIS — M47.816 SPONDYLOSIS OF LUMBAR REGION WITHOUT MYELOPATHY OR RADICULOPATHY: ICD-10-CM

## 2024-02-20 DIAGNOSIS — M51.36 DDD (DEGENERATIVE DISC DISEASE), LUMBAR: ICD-10-CM

## 2024-02-20 DIAGNOSIS — Z74.09 IMPAIRED FUNCTIONAL MOBILITY, BALANCE, GAIT, AND ENDURANCE: Primary | ICD-10-CM

## 2024-02-20 PROCEDURE — 97112 NEUROMUSCULAR REEDUCATION: CPT | Mod: PO

## 2024-02-20 PROCEDURE — 97110 THERAPEUTIC EXERCISES: CPT | Mod: PO

## 2024-02-21 DIAGNOSIS — I47.10 SVT (SUPRAVENTRICULAR TACHYCARDIA): Primary | ICD-10-CM

## 2024-02-23 NOTE — PROGRESS NOTES
"[facility]   Physical Therapy Treatment Note     Name: Anette Willis  Clinic Number: [id number]    Therapy Diagnosis:   Encounter Diagnoses   Name Primary?    Impaired functional mobility, balance, gait, and endurance Yes    DDD (degenerative disc disease), lumbar     Spondylosis of lumbar region without myelopathy or radiculopathy     Lumbar strain, initial encounter                  Physician: Ruth Johnson NP    Visit Date: 2/26/2024    Physician Orders: PT Eval and Treat   Medical Diagnosis from Referral: DDD (degenerative disc disease), lumbar [M51.36], Spondylosis of lumbar region without myelopathy or radiculopathy [M47.816], Lumbar strain, initial encounter [S39.012A]   Evaluation Date: 11/29/2023  Authorization Period Expiration: 11/26/24  Plan of Care Expiration: 5/20/24  Progress Note Due: 3/20/24  Date of Surgery: N/A  Visit # / Visits authorized: 5/ 80   FOTO: 1/ 3     Precautions: Standard, pt is wearing a heart rate monitor     Time In: 10:05  Time Out: 10:40  Total Billable Time: 25 minutes    PTA Visit #: 0/5       Subjective     Patient reports: pain has been constant for two days after pushing a drawer into the dresser.   She was compliant with home exercise program.  Response to previous treatment: tolerated well  Functional change: ongoing    Pain: 0 /10  Location: low back/right LE, R groin - pain with certain movements only    Objective      Objective measures taken at progress report unless specified otherwise.     Treatment     Barbara received the treatments listed below:      Bold = performed    therapeutic exercises to develop strength, endurance, ROM, flexibility, posture, and core stabilization for 10 minutes including:  Reassessment measures taken  Nu step 5'  LTR 2x10  +piriformis stretch (not well tolerated)  Hamstring string stretch 3x30"  Supine sciatic nerve glide with ankle pump x20 B  Sidelying clamshells with green band 2x10 B  Bridges with green " "band 2x10  seated ball rollouts 10x10"      manual therapy techniques: Joint mobilizations, Manual traction, and Soft tissue Mobilization were applied for 10 minutes, including:   hypervolt to right calf  Short axis traction  Hypervolt to glute      neuromuscular re-education activities to improve: Coordination, Kinesthetic, Sense, and Proprioception for 15 minutes. The following activities were included:  Posterior pelvic tilt 20x3"  TrA activation with orange physioball alternating LE 20x3"  Hip adduction isometrics with ball with glute squeeze 20x3"  Hip abduction isometrics with pilates ring 20x3" (increase in pain)  seated sciatic nerve glides x20 B   Standing marching with pressing into ball X 20  Standing hip abduction pressing into ball x20     therapeutic activities to improve functional performance for 0 minutes, including:  hip hinging with dowel x10  sit to stands with dowel for hip hinge x10  shuttle double leg press 2 bands x10  +Dead lifts with dowel 2x10      Patient Education and Home Exercises       Education provided:   - HEP  - Possibility of delayed onset muscle soreness from starting new exercises    Written Home Exercises Provided: Patient instructed to cont prior HEP. Exercises were reviewed and Barbara was able to demonstrate them prior to the end of the session.  Barbara demonstrated good  understanding of the education provided. See Electronic Medical Record under Patient Instructions for exercises provided during therapy sessions    Assessment     Barbara presents today with another exacerbation of pain over the weekend this time resulting in constant pain per patient report. She reports today that she is wearing a heart rate monitor. She did require repeated verbal cues throughout session for slow, deep breathing versus short shallow breaths. She reports some relief with short axis traction and use of massage gun but increase in pain with piriformis stretch. Pt reprits she has not been taking " prescribed medication due to the medication not working last time she tried taking it for 2-3 days. Advised her to reach out to her physician to discuss this issue. Will continue to progress therapy within tolerance.     Barbara Is progressing well towards her goals.   Patient prognosis is Good.     Patient will continue to benefit from skilled outpatient physical therapy to address the deficits listed in the problem list box on initial evaluation, provide pt/family education and to maximize pt's level of independence in the home and community environment.     Patient's spiritual, cultural and educational needs considered and pt agreeable to plan of care and goals.     Anticipated barriers to physical therapy: none    GOALS: (not met, progressing unless otherwise specified)  Short Term Goals:  4 weeks - remains in progress  1.Report decreased right sided back and leg pain  < / =  4/10 at worst to increase tolerance for sleeping  2. Increase lumbar flexion range of motion to 0% limited in order to perform ADLs without difficulty.  3. Increase strength by 1/3 MMT grade in bilateral lower extremity to increase tolerance for ADL and work activities.  4. Pt to tolerate HEP to improve ROM and independence with ADL's     Long Term Goals: 8 weeks - remains in progress  1.Report decreased right sided back and leg pain < / = 2/10  to increase tolerance for ADLs  3.Increase strength to 4+/5 in  bilateral lower extremity to increase tolerance for ADL and work activities.  4. Pt will report at CJ level (20-40%) on FOTO  to demonstrate increase in LE function with every day tasks.      Plan     Continue to progress per PT POC    Brittni Guzman, PT

## 2024-02-26 ENCOUNTER — PATIENT MESSAGE (OUTPATIENT)
Dept: PRIMARY CARE CLINIC | Facility: CLINIC | Age: 79
End: 2024-02-26
Payer: MEDICARE

## 2024-02-26 ENCOUNTER — CLINICAL SUPPORT (OUTPATIENT)
Dept: REHABILITATION | Facility: HOSPITAL | Age: 79
End: 2024-02-26
Payer: MEDICARE

## 2024-02-26 DIAGNOSIS — M54.16 LUMBAR RADICULOPATHY, CHRONIC: Primary | ICD-10-CM

## 2024-02-26 DIAGNOSIS — S39.012A LUMBAR STRAIN, INITIAL ENCOUNTER: ICD-10-CM

## 2024-02-26 DIAGNOSIS — M51.36 DDD (DEGENERATIVE DISC DISEASE), LUMBAR: ICD-10-CM

## 2024-02-26 DIAGNOSIS — Z74.09 IMPAIRED FUNCTIONAL MOBILITY, BALANCE, GAIT, AND ENDURANCE: Primary | ICD-10-CM

## 2024-02-26 DIAGNOSIS — M47.816 SPONDYLOSIS OF LUMBAR REGION WITHOUT MYELOPATHY OR RADICULOPATHY: ICD-10-CM

## 2024-02-26 PROCEDURE — 97112 NEUROMUSCULAR REEDUCATION: CPT | Mod: PO

## 2024-02-26 PROCEDURE — 97140 MANUAL THERAPY 1/> REGIONS: CPT | Mod: PO

## 2024-02-26 NOTE — TELEPHONE ENCOUNTER
Please see my chart message regarding MRI, when ordering do you usually order with contrast or without? Also, pt is following up on leg pain and possible pain medication.

## 2024-02-27 DIAGNOSIS — Z00.00 ENCOUNTER FOR MEDICARE ANNUAL WELLNESS EXAM: ICD-10-CM

## 2024-02-29 ENCOUNTER — OFFICE VISIT (OUTPATIENT)
Dept: ELECTROPHYSIOLOGY | Facility: CLINIC | Age: 79
End: 2024-02-29
Payer: MEDICARE

## 2024-02-29 ENCOUNTER — HOSPITAL ENCOUNTER (OUTPATIENT)
Dept: CARDIOLOGY | Facility: CLINIC | Age: 79
Discharge: HOME | End: 2024-02-29
Payer: MEDICARE

## 2024-02-29 VITALS
SYSTOLIC BLOOD PRESSURE: 146 MMHG | WEIGHT: 160.94 LBS | BODY MASS INDEX: 25.86 KG/M2 | HEIGHT: 66 IN | HEART RATE: 73 BPM | DIASTOLIC BLOOD PRESSURE: 70 MMHG

## 2024-02-29 DIAGNOSIS — I10 HYPERTENSION, UNSPECIFIED TYPE: ICD-10-CM

## 2024-02-29 DIAGNOSIS — I48.0 PAROXYSMAL ATRIAL FIBRILLATION: Primary | ICD-10-CM

## 2024-02-29 DIAGNOSIS — I47.10 SVT (SUPRAVENTRICULAR TACHYCARDIA): ICD-10-CM

## 2024-02-29 LAB
OHS QRS DURATION: 64 MS
OHS QTC CALCULATION: 394 MS

## 2024-02-29 PROCEDURE — 99214 OFFICE O/P EST MOD 30 MIN: CPT | Mod: PBBFAC,25 | Performed by: INTERNAL MEDICINE

## 2024-02-29 PROCEDURE — 93005 ELECTROCARDIOGRAM TRACING: CPT | Mod: PBBFAC | Performed by: INTERNAL MEDICINE

## 2024-02-29 PROCEDURE — 93010 ELECTROCARDIOGRAM REPORT: CPT | Mod: S$PBB,,, | Performed by: INTERNAL MEDICINE

## 2024-02-29 PROCEDURE — 99204 OFFICE O/P NEW MOD 45 MIN: CPT | Mod: S$PBB,,, | Performed by: INTERNAL MEDICINE

## 2024-02-29 PROCEDURE — 99999 PR PBB SHADOW E&M-EST. PATIENT-LVL IV: CPT | Mod: PBBFAC,,, | Performed by: INTERNAL MEDICINE

## 2024-02-29 RX ORDER — FLECAINIDE ACETATE 100 MG/1
100 TABLET ORAL EVERY 12 HOURS
Qty: 60 TABLET | Refills: 11 | Status: SHIPPED | OUTPATIENT
Start: 2024-02-29 | End: 2025-02-28

## 2024-02-29 NOTE — PROGRESS NOTES
Subjective:   Patient ID:  Anette Willis is a 79 y.o. female who presents for evaluation of Atrial Fibrillation    Referring Cardiology Providers: Neil Dubois MD and Abby Richards MD  Primary Care Physician: Simon Hernandez MD    HPI  I had the pleasure of seeing Mrs. Willis today in our electrophysiology clinic in consultation for her atrial fibrillation. As you are aware she is a pleasant 79 year-old woman with hypertension and SVT. She has a long history of palpitations, previously determined to be SVT. She was treated with metoprolol by her cardiologist when she lived in [place]. She saw Dr. Dubois to establish care after moving to [place]. A 30 day event monitor was ordered. She was observed to have an episode of AF with RVR for which she has been referred. She was started on eliquis. No ECGs from [place] of SVT. Eliquis is very unaffordable. She paid $800 for a 90 day supply.    ECHO 2/2024: normal LVEF, normal LA size    My interpretation of today's in-clinic ECG is sinus rhythm with PACs. QRS is narrow.    Review of Systems   Constitutional: Negative for fever and malaise/fatigue.   HENT:  Negative for congestion and sore throat.    Eyes:  Negative for blurred vision and visual disturbance.   Cardiovascular:  Positive for irregular heartbeat and palpitations. Negative for chest pain, dyspnea on exertion, near-syncope and syncope.   Respiratory:  Negative for cough and shortness of breath.    Hematologic/Lymphatic: Negative for bleeding problem. Does not bruise/bleed easily.   Skin: Negative.    Musculoskeletal:  Positive for arthritis and joint pain.   Gastrointestinal:  Negative for bloating, abdominal pain, hematochezia and melena.   Neurological:  Negative for focal weakness and weakness.   Psychiatric/Behavioral: Negative.         Objective:   Physical Exam  Vitals reviewed.   Constitutional:       General: She is not in acute distress.     Appearance: She is well-developed. She is not  "diaphoretic.   HENT:      Head: Normocephalic and atraumatic.   Eyes:      General:         Right eye: No discharge.         Left eye: No discharge.      Conjunctiva/sclera: Conjunctivae normal.   Cardiovascular:      Rate and Rhythm: Normal rate and regular rhythm.      Heart sounds: No murmur heard.     No friction rub. No gallop.   Pulmonary:      Effort: Pulmonary effort is normal. No respiratory distress.      Breath sounds: Normal breath sounds. No wheezing or rales.   Abdominal:      General: Bowel sounds are normal. There is no distension.      Palpations: Abdomen is soft.      Tenderness: There is no abdominal tenderness.   Musculoskeletal:      Cervical back: Neck supple.   Skin:     General: Skin is warm and dry.   Neurological:      Mental Status: She is alert and oriented to person, place, and time.   Psychiatric:         Behavior: Behavior normal.         Thought Content: Thought content normal.         Judgment: Judgment normal.         Assessment:      1. Paroxysmal atrial fibrillation    2. SVT (supraventricular tachycardia)    3. Hypertension, unspecified type        Plan:     In summary, Mrs. Willis is a pleasant 79 year-old woman with hypertension and reported SVT presenting for evaluation for newly diagnosed atrial fibrillation. I had a long discussion with the patient about the pathophysiology and risks of atrial fibrillation and its basic pathophysiology, including its health implications and treatment options. Specifically, I addressed the need for CVA (stroke) prophylaxis with aspirin versus oral anticoagulation (warfarin vs DOACs, discussed bleeding risks, and need to come to the ER for any head trauma for CT scanning even if asymptomatic). [id number] score is 4 and oral anticoagulation is indicated. I also discussed the goal to reduce symptomatic arrhythmic episodes by pharmacologic and/or procedural methods and utilizing a rhythm versus a rate control strategy. Suspect "SVT" is a " paroxysmal atrial tachycardia. Discussed drug therapy versus ablation. She prefers to start with an anti-arrhythmic drug. She has a structurally normal heart and no symptoms of ischemic heart disease. Will start flecainide 100mg bid. Requested she check with her drug insurance company if dabigatran is covered. If not recommend switching to warfarin at the end of her 90 days of eliquis.    RTC in 6-8 weeks    Thank you for allowing me to participate in the care of this patient. Please do not hesitate to call me with any questions or concerns.    Blade Post MD, PhD  Cardiac Electrophysiology

## 2024-02-29 NOTE — PROGRESS NOTES
"[facility]   Physical Therapy Treatment Note     Name: Anette Willis  Clinic Number: [id number]    Therapy Diagnosis:   Encounter Diagnoses   Name Primary?    Impaired functional mobility, balance, gait, and endurance Yes    DDD (degenerative disc disease), lumbar     Spondylosis of lumbar region without myelopathy or radiculopathy     Lumbar strain, initial encounter                    Physician: Ruth Johnson NP    Visit Date: 3/1/2024    Physician Orders: PT Eval and Treat   Medical Diagnosis from Referral: DDD (degenerative disc disease), lumbar [M51.36], Spondylosis of lumbar region without myelopathy or radiculopathy [M47.816], Lumbar strain, initial encounter [S39.012A]   Evaluation Date: 11/29/2023  Authorization Period Expiration: 11/26/24  Plan of Care Expiration: 5/20/24  Progress Note Due: 3/20/24  Date of Surgery: N/A  Visit # / Visits authorized: 6/ 80   FOTO: 1/ 3     Precautions: Standard, pt is wearing a heart rate monitor     Time In: 12:00  Time Out: 12:50  Total Billable Time: 25 minutes    PTA Visit #: 0/5       Subjective     Patient reports: feeling better but still having some pain. She will be having an MRI. She did not do any exercises yesterday because it was hurting. "I read on the internet that there is no healing that will happen and it is only going to get worse"  She was compliant with home exercise program.  Response to previous treatment: tolerated well  Functional change: ongoing    Pain: 0 /10  Location: low back/right LE, R groin - pain with certain movements only    Objective      Objective measures taken at progress report unless specified otherwise.     Treatment     Barbara received the treatments listed below:      Bold = performed    therapeutic exercises to develop strength, endurance, ROM, flexibility, posture, and core stabilization for 25 minutes including:  Reassessment measures taken  Recumbent bike 5'  LTR 2x10  piriformis stretch " "2x30"  Hamstring string stretch 3x30"  Supine sciatic nerve glide with ankle pump x20 B  Hooklying clamshells with red band 2x10 B  Bridges with green band 2x10  seated ball rollouts 10x10"      manual therapy techniques: Joint mobilizations, Manual traction, and Soft tissue Mobilization were applied for 0 minutes, including:   hypervolt to right calf  Short axis traction  Hypervolt to glute      neuromuscular re-education activities to improve: Coordination, Kinesthetic, Sense, and Proprioception for 25 minutes. The following activities were included:  Posterior pelvic tilt 20x3"  TrA activation with orange physioball alternating LE 20x3"  Hip adduction isometrics with ball with glute squeeze 20x3"  Hip abduction isometrics with pilates ring 20x3"  seated sciatic nerve glides x20 B   Standing marching with pressing into ball X 20  Standing hip abduction x20     therapeutic activities to improve functional performance for 0 minutes, including:  hip hinging with dowel x10  sit to stands with dowel for hip hinge x10  shuttle double leg press 2 bands x10  +Dead lifts with dowel 2x10      Patient Education and Home Exercises       Education provided:   - HEP  - Possibility of delayed onset muscle soreness from starting new exercises    Written Home Exercises Provided: Patient instructed to cont prior HEP. Exercises were reviewed and Barbara was able to demonstrate them prior to the end of the session.  Barbara demonstrated good  understanding of the education provided. See Electronic Medical Record under Patient Instructions for exercises provided during therapy sessions    Assessment     Pt tolerated added exercises today without increase in pain. She is frequently observed crossing the legs despite PT recommendations not to do so. Pt reports this position helps to reduce her pain. Discussed with her lifting limitation of 5lb until pain is under control. Continue to progress.     Barbara Is progressing well towards her goals. "   Patient prognosis is Good.     Patient will continue to benefit from skilled outpatient physical therapy to address the deficits listed in the problem list box on initial evaluation, provide pt/family education and to maximize pt's level of independence in the home and community environment.     Patient's spiritual, cultural and educational needs considered and pt agreeable to plan of care and goals.     Anticipated barriers to physical therapy: none    GOALS: (not met, progressing unless otherwise specified)  Short Term Goals:  4 weeks - remains in progress  1.Report decreased right sided back and leg pain  < / =  4/10 at worst to increase tolerance for sleeping  2. Increase lumbar flexion range of motion to 0% limited in order to perform ADLs without difficulty.  3. Increase strength by 1/3 MMT grade in bilateral lower extremity to increase tolerance for ADL and work activities.  4. Pt to tolerate HEP to improve ROM and independence with ADL's     Long Term Goals: 8 weeks - remains in progress  1.Report decreased right sided back and leg pain < / = 2/10  to increase tolerance for ADLs  3.Increase strength to 4+/5 in  bilateral lower extremity to increase tolerance for ADL and work activities.  4. Pt will report at CJ level (20-40%) on FOTO  to demonstrate increase in LE function with every day tasks.      Plan     Continue to progress per PT POC    Brittni Guzman, PT

## 2024-02-29 NOTE — PATIENT INSTRUCTIONS
1.) Check with your drug insurance if Dabigatran (Pradaxa) is covered. If not we will need to switch your Eliquis to warfarin at the end of the 90 days of eliquis.    2.) To help keep you in normal rhythm I would like to add a drug called flecainide. If this is too expensive or causes unwanted side effects please let me know and we can find an alternative.

## 2024-03-01 ENCOUNTER — CLINICAL SUPPORT (OUTPATIENT)
Dept: REHABILITATION | Facility: HOSPITAL | Age: 79
End: 2024-03-01
Payer: MEDICARE

## 2024-03-01 DIAGNOSIS — M47.816 SPONDYLOSIS OF LUMBAR REGION WITHOUT MYELOPATHY OR RADICULOPATHY: ICD-10-CM

## 2024-03-01 DIAGNOSIS — S39.012A LUMBAR STRAIN, INITIAL ENCOUNTER: ICD-10-CM

## 2024-03-01 DIAGNOSIS — Z74.09 IMPAIRED FUNCTIONAL MOBILITY, BALANCE, GAIT, AND ENDURANCE: Primary | ICD-10-CM

## 2024-03-01 DIAGNOSIS — M51.36 DDD (DEGENERATIVE DISC DISEASE), LUMBAR: ICD-10-CM

## 2024-03-01 PROCEDURE — 97110 THERAPEUTIC EXERCISES: CPT | Mod: PO

## 2024-03-01 PROCEDURE — 97112 NEUROMUSCULAR REEDUCATION: CPT | Mod: PO

## 2024-03-02 NOTE — PROGRESS NOTES
[facility] Progress Note  3/4/2024    This was a virtual visit conducted via webcam.      Reason for Visit:      is following up on back pain  Time spent on visit today: 15 minutes    History of Present Illness:     Pt was pushing in a drawer and felt a terrible pain in legs.  Feels like an electric wire in legs.  Trouble walking- whenever she puts pressure on legs.  Ok when sitting  Yesterday and today finally feeling better after it began about 2 weeks ago    First episode in November- took a month to feel better.  IN December, moved some boxes and it started over again.  Then this happened when she pushed that drawer closed.  That happened about 2 weeks ago  Its not as bad this time as the previous ones.  Has not had any weakness in legs  No bowel or bladder incontinence.  Has been doing physical therapy - has been doing gentle exercises.  Does get a little bit of numbness in legs  She had tried using meloxicam, gabapentin and methocarbamol and the first time she got a lot of relief, but hasn't helped at all on subsequent episodes.  Advised probably shouldn't take melxoicam since she is on eliquis.  She is going to try topical voltaren, and will give a small supply of tramadol to use prn for recurrences.  MRI is scheduled 3/15 and will plan referrals possibly after those results are available if they confirm disc disease.    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  2023-11: Lumbar strain, initial encounter  2023-03: Hypothyroidism  2023-03: HTN (hypertension)  2023-03: Dyslipidemia  2023-03: SVT (supraventricular tachycardia)        Medications:       Current Outpatient Medications:     apixaban (ELIQUIS) 5 mg Tab, Take 1 tablet (5 mg total) by  mouth 2 (two) times daily., Disp: 180 tablet, Rfl: 3    flecainide (TAMBOCOR) 100 MG Tab, Take 1 tablet (100 mg total) by mouth every 12 (twelve) hours., Disp: 60 tablet, Rfl: 11    levothyroxine (SYNTHROID) 75 MCG tablet, Take 1 tablet (75 mcg total) by mouth every morning., Disp: 90 tablet, Rfl: 1    losartan (COZAAR) 100 MG tablet, Take 1 tablet (100 mg total) by mouth once daily., Disp: 90 tablet, Rfl: 1    metoprolol succinate (TOPROL-XL) 100 MG 24 hr tablet, Take 1 tablet (100 mg total) by mouth 2 (two) times daily., Disp: 60 tablet, Rfl: 11    rosuvastatin (CRESTOR) 10 MG tablet, Take 1 tablet (10 mg total) by mouth once daily., Disp: 90 tablet, Rfl: 1    traMADoL (ULTRAM) 50 mg tablet, Take 1 tablet (50 mg total) by mouth every 6 (six) hours as needed for Pain., Disp: 21 tablet, Rfl: 0      Review of Systems:     Review of Systems   Constitutional:  Positive for activity change. Negative for unexpected weight change.   HENT:  Negative for hearing loss, rhinorrhea and trouble swallowing.    Eyes:  Positive for visual disturbance. Negative for discharge.   Respiratory:  Negative for chest tightness and wheezing.    Cardiovascular:  Positive for palpitations. Negative for chest pain.   Gastrointestinal:  Negative for blood in stool, constipation, diarrhea and vomiting.   Endocrine: Negative for polydipsia and polyuria.   Genitourinary:  Negative for difficulty urinating, dysuria, hematuria and menstrual problem.   Musculoskeletal:  Negative for arthralgias, joint swelling and neck pain.   Neurological:  Negative for weakness and headaches.   Psychiatric/Behavioral:  Negative for confusion and dysphoric mood.            Physical Exam:     Pt is alert and oriented.  Speech is clear and coherent.  Speaking in complete sentences.  No distress.  Mood and affect are normal.      Assessment and Plan:     1. Lumbar radiculopathy    2. Pain in both lower extremities  Voltaren gel topically  Tramadol 50 mg q8 prn #21  prescribed  Discussed risks on tramadol.  Follow up prn if no improvement or worsening     Simon Hernandez MD  3/4/2024    This note was prepared using voice-recognition software.  Although efforts are made to proofread the note, some errors may persist in the final document.    Dr. Hernandez's [place] License number is [id number]  This was a virtual (audio/video visit) in lieu of in-person visit in context of the coronavirus emergency.      Patient/Family members identity was confirmed, and confidentiality/privacy confirmed prior to visit. Verbal informed consent was obtained from the patient's legal guardian or patient when appropriate to conduct this virtual visit.  They authorized me to provide medical care and voiced understanding of the risks, benefits, and alternatives of virtual care.  Guardian understands the limitations inherent of a virtual visit, that they may choose to be seen in person if desired or needed, and that they may halt the virtual visit at any time for any reason.     Originating Site: Patient's home   Distant Site:  [facility]     I certify that this visit was done via secure two-way simultaneous audio and video transmission with informed consent of the patient and/or guardian. Over 50% of the time was counseling or coordinating care.

## 2024-03-04 ENCOUNTER — OFFICE VISIT (OUTPATIENT)
Dept: PRIMARY CARE CLINIC | Facility: CLINIC | Age: 79
End: 2024-03-04
Payer: MEDICARE

## 2024-03-04 DIAGNOSIS — M54.16 LUMBAR RADICULOPATHY: Primary | ICD-10-CM

## 2024-03-04 DIAGNOSIS — M79.604 PAIN IN BOTH LOWER EXTREMITIES: ICD-10-CM

## 2024-03-04 DIAGNOSIS — M79.605 PAIN IN BOTH LOWER EXTREMITIES: ICD-10-CM

## 2024-03-04 PROCEDURE — 99214 OFFICE O/P EST MOD 30 MIN: CPT | Mod: 95,,, | Performed by: INTERNAL MEDICINE

## 2024-03-04 RX ORDER — TRAMADOL HYDROCHLORIDE 50 MG/1
50 TABLET ORAL EVERY 6 HOURS PRN
Qty: 21 TABLET | Refills: 0 | Status: SHIPPED | OUTPATIENT
Start: 2024-03-04

## 2024-03-04 NOTE — PROGRESS NOTES
"[facility]   Physical Therapy Treatment Note     Name: Anette Willis  Clinic Number: [id number]    Therapy Diagnosis:   Encounter Diagnoses   Name Primary?    Impaired functional mobility, balance, gait, and endurance Yes    DDD (degenerative disc disease), lumbar     Spondylosis of lumbar region without myelopathy or radiculopathy     Lumbar strain, initial encounter                      Physician: Ruth Johnson NP    Visit Date: 3/5/2024    Physician Orders: PT Eval and Treat   Medical Diagnosis from Referral: DDD (degenerative disc disease), lumbar [M51.36], Spondylosis of lumbar region without myelopathy or radiculopathy [M47.816], Lumbar strain, initial encounter [S39.012A]   Evaluation Date: 11/29/2023  Authorization Period Expiration: 11/26/24  Plan of Care Expiration: 5/20/24  Progress Note Due: 3/20/24  Date of Surgery: N/A  Visit # / Visits authorized: 7/ 80   FOTO: 1/ 3     Precautions: Standard, pt is wearing a heart rate monitor     Time In: 10:03 am  Time Out: 10:50 am  Total Billable Time: 23 minutes one on one    PTA Visit #: 1/5       Subjective     Patient reports: she is not having the pain down her leg, but tightness in lateral right thigh  She was compliant with home exercise program.  Response to previous treatment: tolerated well  Functional change: ongoing    Pain: 0 /10  Location: low back/right LE, R groin - pain with certain movements only    Objective      Objective measures taken at progress report unless specified otherwise.     Treatment     Barbara received the treatments listed below:      Bold = performed    therapeutic exercises to develop strength, endurance, ROM, flexibility, posture, and core stabilization for 20 minutes including:  Reassessment measures taken  Recumbent bike 6'  LTR 2x10  piriformis stretch 2x30"  Hamstring string stretch 3x30"  Supine sciatic nerve glide with ankle pump x20 B  Hooklying clamshells with red band 2x10 B  Bridges " "with green band 2x10  seated ball rollouts 10x10"      manual therapy techniques: Joint mobilizations, Manual traction, and Soft tissue Mobilization were applied for 0 minutes, including:   hypervolt to right calf  Short axis traction  Hypervolt to glute      neuromuscular re-education activities to improve: Coordination, Kinesthetic, Sense, and Proprioception for 27 minutes. The following activities were included:  Posterior pelvic tilt 20x3"  TrA activation with orange physioball alternating LE 20x3"  Hip adduction isometrics with ball with glute squeeze 20x3"  Hip abduction isometrics with pilates ring 20x3"  supine sciatic nerve glides x20 B   +Standing TrA activation pressing into ball 20x3"  Standing marching with pressing into ball X 20  Standing hip abduction pressing into ball x20     therapeutic activities to improve functional performance for 0 minutes, including:  hip hinging with dowel x10  sit to stands with dowel for hip hinge x10  shuttle double leg press 2 bands x10  +Dead lifts with dowel 2x10      Patient Education and Home Exercises       Education provided:   - HEP  - Possibility of delayed onset muscle soreness from starting new exercises    Written Home Exercises Provided: Patient instructed to cont prior HEP. Exercises were reviewed and Barbara was able to demonstrate them prior to the end of the session.  Barbara demonstrated good  understanding of the education provided. See Electronic Medical Record under Patient Instructions for exercises provided during therapy sessions    Assessment     Barbara presents to treatment with no pain today. She was able to tolerate progression of exercises with no increase in symptoms. She continues to be hesitant to progress and fearful of pain returning. Continue to progress as tolerated.     Barbara Is progressing well towards her goals.   Patient prognosis is Good.     Patient will continue to benefit from skilled outpatient physical therapy to address the deficits " listed in the problem list box on initial evaluation, provide pt/family education and to maximize pt's level of independence in the home and community environment.     Patient's spiritual, cultural and educational needs considered and pt agreeable to plan of care and goals.     Anticipated barriers to physical therapy: none    GOALS: (not met, progressing unless otherwise specified)  Short Term Goals:  4 weeks - remains in progress  1.Report decreased right sided back and leg pain  < / =  4/10 at worst to increase tolerance for sleeping  2. Increase lumbar flexion range of motion to 0% limited in order to perform ADLs without difficulty.  3. Increase strength by 1/3 MMT grade in bilateral lower extremity to increase tolerance for ADL and work activities.  4. Pt to tolerate HEP to improve ROM and independence with ADL's     Long Term Goals: 8 weeks - remains in progress  1.Report decreased right sided back and leg pain < / = 2/10  to increase tolerance for ADLs  3.Increase strength to 4+/5 in  bilateral lower extremity to increase tolerance for ADL and work activities.  4. Pt will report at CJ level (20-40%) on FOTO  to demonstrate increase in LE function with every day tasks.      Plan     Continue to progress per PT POC    Nathalia Chapman, PTA

## 2024-03-05 ENCOUNTER — LAB VISIT (OUTPATIENT)
Dept: LAB | Facility: HOSPITAL | Age: 79
End: 2024-03-05
Attending: INTERNAL MEDICINE
Payer: MEDICARE

## 2024-03-05 ENCOUNTER — CLINICAL SUPPORT (OUTPATIENT)
Dept: REHABILITATION | Facility: HOSPITAL | Age: 79
End: 2024-03-05
Payer: MEDICARE

## 2024-03-05 DIAGNOSIS — E78.5 DYSLIPIDEMIA: ICD-10-CM

## 2024-03-05 DIAGNOSIS — R73.01 IFG (IMPAIRED FASTING GLUCOSE): ICD-10-CM

## 2024-03-05 DIAGNOSIS — Z74.09 IMPAIRED FUNCTIONAL MOBILITY, BALANCE, GAIT, AND ENDURANCE: Primary | ICD-10-CM

## 2024-03-05 DIAGNOSIS — M47.816 SPONDYLOSIS OF LUMBAR REGION WITHOUT MYELOPATHY OR RADICULOPATHY: ICD-10-CM

## 2024-03-05 DIAGNOSIS — I10 HYPERTENSION, UNSPECIFIED TYPE: ICD-10-CM

## 2024-03-05 DIAGNOSIS — E55.9 VITAMIN D DEFICIENCY: ICD-10-CM

## 2024-03-05 DIAGNOSIS — S39.012A LUMBAR STRAIN, INITIAL ENCOUNTER: ICD-10-CM

## 2024-03-05 DIAGNOSIS — E03.9 HYPOTHYROIDISM, UNSPECIFIED TYPE: ICD-10-CM

## 2024-03-05 DIAGNOSIS — M51.36 DDD (DEGENERATIVE DISC DISEASE), LUMBAR: ICD-10-CM

## 2024-03-05 LAB
25(OH)D3+25(OH)D2 SERPL-MCNC: 45 NG/ML (ref 30–96)
ALBUMIN SERPL BCP-MCNC: 3.8 G/DL (ref 3.5–5.2)
ALP SERPL-CCNC: 64 U/L (ref 55–135)
ALT SERPL W/O P-5'-P-CCNC: 14 U/L (ref 10–44)
ANION GAP SERPL CALC-SCNC: 10 MMOL/L (ref 8–16)
AST SERPL-CCNC: 20 U/L (ref 10–40)
BASOPHILS # BLD AUTO: 0.05 K/UL (ref 0–0.2)
BASOPHILS NFR BLD: 0.6 % (ref 0–1.9)
BILIRUB SERPL-MCNC: 0.5 MG/DL (ref 0.1–1)
BUN SERPL-MCNC: 13 MG/DL (ref 8–23)
CALCIUM SERPL-MCNC: 9.4 MG/DL (ref 8.7–10.5)
CHLORIDE SERPL-SCNC: 106 MMOL/L (ref 95–110)
CHOLEST SERPL-MCNC: 133 MG/DL (ref 120–199)
CHOLEST/HDLC SERPL: 3.7 {RATIO} (ref 2–5)
CO2 SERPL-SCNC: 22 MMOL/L (ref 23–29)
CREAT SERPL-MCNC: 0.9 MG/DL (ref 0.5–1.4)
DIFFERENTIAL METHOD BLD: NORMAL
EOSINOPHIL # BLD AUTO: 0.1 K/UL (ref 0–0.5)
EOSINOPHIL NFR BLD: 1.7 % (ref 0–8)
ERYTHROCYTE [DISTWIDTH] IN BLOOD BY AUTOMATED COUNT: 13.8 % (ref 11.5–14.5)
EST. GFR  (NO RACE VARIABLE): >60 ML/MIN/1.73 M^2
ESTIMATED AVG GLUCOSE: 131 MG/DL (ref 68–131)
GLUCOSE SERPL-MCNC: 119 MG/DL (ref 70–110)
HBA1C MFR BLD: 6.2 % (ref 4–5.6)
HCT VFR BLD AUTO: 43.3 % (ref 37–48.5)
HDLC SERPL-MCNC: 36 MG/DL (ref 40–75)
HDLC SERPL: 27.1 % (ref 20–50)
HGB BLD-MCNC: 14.1 G/DL (ref 12–16)
IMM GRANULOCYTES # BLD AUTO: 0.02 K/UL (ref 0–0.04)
IMM GRANULOCYTES NFR BLD AUTO: 0.2 % (ref 0–0.5)
LDLC SERPL CALC-MCNC: 73.8 MG/DL (ref 63–159)
LYMPHOCYTES # BLD AUTO: 2.4 K/UL (ref 1–4.8)
LYMPHOCYTES NFR BLD: 28.3 % (ref 18–48)
MCH RBC QN AUTO: 27 PG (ref 27–31)
MCHC RBC AUTO-ENTMCNC: 32.6 G/DL (ref 32–36)
MCV RBC AUTO: 83 FL (ref 82–98)
MONOCYTES # BLD AUTO: 0.7 K/UL (ref 0.3–1)
MONOCYTES NFR BLD: 8.6 % (ref 4–15)
NEUTROPHILS # BLD AUTO: 5.1 K/UL (ref 1.8–7.7)
NEUTROPHILS NFR BLD: 60.6 % (ref 38–73)
NONHDLC SERPL-MCNC: 97 MG/DL
NRBC BLD-RTO: 0 /100 WBC
PLATELET # BLD AUTO: 276 K/UL (ref 150–450)
PMV BLD AUTO: 12.4 FL (ref 9.2–12.9)
POTASSIUM SERPL-SCNC: 4.8 MMOL/L (ref 3.5–5.1)
PROT SERPL-MCNC: 6.5 G/DL (ref 6–8.4)
RBC # BLD AUTO: 5.22 M/UL (ref 4–5.4)
SODIUM SERPL-SCNC: 138 MMOL/L (ref 136–145)
T4 FREE SERPL-MCNC: 1.46 NG/DL (ref 0.71–1.51)
TRIGL SERPL-MCNC: 116 MG/DL (ref 30–150)
TSH SERPL DL<=0.005 MIU/L-ACNC: 1.32 UIU/ML (ref 0.4–4)
WBC # BLD AUTO: 8.37 K/UL (ref 3.9–12.7)

## 2024-03-05 PROCEDURE — 36415 COLL VENOUS BLD VENIPUNCTURE: CPT | Mod: PN | Performed by: INTERNAL MEDICINE

## 2024-03-05 PROCEDURE — 84439 ASSAY OF FREE THYROXINE: CPT | Performed by: INTERNAL MEDICINE

## 2024-03-05 PROCEDURE — 83036 HEMOGLOBIN GLYCOSYLATED A1C: CPT | Performed by: INTERNAL MEDICINE

## 2024-03-05 PROCEDURE — 82306 VITAMIN D 25 HYDROXY: CPT | Performed by: INTERNAL MEDICINE

## 2024-03-05 PROCEDURE — 84443 ASSAY THYROID STIM HORMONE: CPT | Performed by: INTERNAL MEDICINE

## 2024-03-05 PROCEDURE — 80061 LIPID PANEL: CPT | Performed by: INTERNAL MEDICINE

## 2024-03-05 PROCEDURE — 85025 COMPLETE CBC W/AUTO DIFF WBC: CPT | Performed by: INTERNAL MEDICINE

## 2024-03-05 PROCEDURE — 97110 THERAPEUTIC EXERCISES: CPT | Mod: PO,CQ

## 2024-03-05 PROCEDURE — 80053 COMPREHEN METABOLIC PANEL: CPT | Performed by: INTERNAL MEDICINE

## 2024-03-05 PROCEDURE — 97112 NEUROMUSCULAR REEDUCATION: CPT | Mod: PO,CQ

## 2024-03-06 NOTE — PROGRESS NOTES
"[facility]   Physical Therapy Treatment Note     Name: Anette Willis  Clinic Number: [id number]    Therapy Diagnosis:   Encounter Diagnoses   Name Primary?    Impaired functional mobility, balance, gait, and endurance Yes    DDD (degenerative disc disease), lumbar     Spondylosis of lumbar region without myelopathy or radiculopathy     Lumbar strain, initial encounter            Physician: Ruth Johnson NP    Visit Date: 3/7/2024    Physician Orders: PT Eval and Treat   Medical Diagnosis from Referral: DDD (degenerative disc disease), lumbar [M51.36], Spondylosis of lumbar region without myelopathy or radiculopathy [M47.816], Lumbar strain, initial encounter [S39.012A]   Evaluation Date: 11/29/2023  Authorization Period Expiration: 11/26/24  Plan of Care Expiration: 5/20/24  Progress Note Due: 3/20/24  Date of Surgery: N/A  Visit # / Visits authorized: 8/ 80   FOTO: 1/ 3     Precautions: Standard, pt is wearing a heart rate monitor     Time In: 10:00  Time Out: 10:46  Total Billable Time: 23 minutes    PTA Visit #: 0/5       Subjective     Patient reports: her pain is much better   She was compliant with home exercise program.  Response to previous treatment: tolerated well  Functional change: ongoing    Pain: 0 /10  Location: low back/right LE, R groin - pain with certain movements only    Objective      Objective measures taken at progress report unless specified otherwise.     Treatment     Barbara received the treatments listed below:      Bold = performed    therapeutic exercises to develop strength, endurance, ROM, flexibility, posture, and core stabilization for 23 minutes including:  Reassessment measures taken  Recumbent bike 6'  LTR 2x10  piriformis stretch 2x30"  Hamstring string stretch 3x30"  Supine sciatic nerve glide with ankle pump x20 B  Hooklying clamshells with red band 3x10 B  Bridges with green band 2x10  seated ball rollouts 10x10"  +seated hip internal rotation " "and external rotation x15 each B       manual therapy techniques: Joint mobilizations, Manual traction, and Soft tissue Mobilization were applied for 0 minutes, including:   hypervolt to right calf  Short axis traction  Hypervolt to glute      neuromuscular re-education activities to improve: Coordination, Kinesthetic, Sense, and Proprioception for 23 minutes. The following activities were included:  Posterior pelvic tilt 20x3"  TrA activation with orange physioball alternating LE 20x3"  Hip adduction isometrics with ball with glute squeeze 20x3"  Hip abduction isometrics with pilates ring 20x3"  supine sciatic nerve glides x20 B   +Standing TrA activation pressing into ball 20x3"  Standing marching with pressing into ball X 20  Standing hip abduction pressing into ball x20     therapeutic activities to improve functional performance for 0 minutes, including:  hip hinging with dowel x10  sit to stands with dowel for hip hinge x10  shuttle double leg press 2 bands x10  +Dead lifts with dowel 2x10      Patient Education and Home Exercises       Education provided:   - HEP  - Possibility of delayed onset muscle soreness from starting new exercises    Written Home Exercises Provided: Patient instructed to cont prior HEP. Exercises were reviewed and Barbara was able to demonstrate them prior to the end of the session.  Barbara demonstrated good  understanding of the education provided. See Electronic Medical Record under Patient Instructions for exercises provided during therapy sessions    Assessment     Barbara tolerated session today with no increase in pain to report. Reviewed proper core engagement without holding breath during all daily activities including sit to stands. Will continue to progress as tolerated.     Barbara Is progressing well towards her goals.   Patient prognosis is Good.     Patient will continue to benefit from skilled outpatient physical therapy to address the deficits listed in the problem list box on " initial evaluation, provide pt/family education and to maximize pt's level of independence in the home and community environment.     Patient's spiritual, cultural and educational needs considered and pt agreeable to plan of care and goals.     Anticipated barriers to physical therapy: none    GOALS: (not met, progressing unless otherwise specified)  Short Term Goals:  4 weeks - remains in progress  1.Report decreased right sided back and leg pain  < / =  4/10 at worst to increase tolerance for sleeping  2. Increase lumbar flexion range of motion to 0% limited in order to perform ADLs without difficulty.  3. Increase strength by 1/3 MMT grade in bilateral lower extremity to increase tolerance for ADL and work activities.  4. Pt to tolerate HEP to improve ROM and independence with ADL's     Long Term Goals: 8 weeks - remains in progress  1.Report decreased right sided back and leg pain < / = 2/10  to increase tolerance for ADLs  3.Increase strength to 4+/5 in  bilateral lower extremity to increase tolerance for ADL and work activities.  4. Pt will report at CJ level (20-40%) on FOTO  to demonstrate increase in LE function with every day tasks.      Plan     Continue to progress per PT POC    Brittni Guzman, PT

## 2024-03-07 ENCOUNTER — CLINICAL SUPPORT (OUTPATIENT)
Dept: REHABILITATION | Facility: HOSPITAL | Age: 79
End: 2024-03-07
Payer: MEDICARE

## 2024-03-07 DIAGNOSIS — S39.012A LUMBAR STRAIN, INITIAL ENCOUNTER: ICD-10-CM

## 2024-03-07 DIAGNOSIS — Z74.09 IMPAIRED FUNCTIONAL MOBILITY, BALANCE, GAIT, AND ENDURANCE: Primary | ICD-10-CM

## 2024-03-07 DIAGNOSIS — M51.36 DDD (DEGENERATIVE DISC DISEASE), LUMBAR: ICD-10-CM

## 2024-03-07 DIAGNOSIS — M47.816 SPONDYLOSIS OF LUMBAR REGION WITHOUT MYELOPATHY OR RADICULOPATHY: ICD-10-CM

## 2024-03-07 PROCEDURE — 97110 THERAPEUTIC EXERCISES: CPT | Mod: PO

## 2024-03-07 PROCEDURE — 97112 NEUROMUSCULAR REEDUCATION: CPT | Mod: PO

## 2024-03-08 NOTE — PROGRESS NOTES
"[facility]   Physical Therapy Treatment Note     Name: Anette Willis  Clinic Number: [id number]    Therapy Diagnosis:   Encounter Diagnoses   Name Primary?    Impaired functional mobility, balance, gait, and endurance Yes    DDD (degenerative disc disease), lumbar     Spondylosis of lumbar region without myelopathy or radiculopathy     Lumbar strain, initial encounter              Physician: Ruth Johnson NP    Visit Date: 3/11/2024    Physician Orders: PT Eval and Treat   Medical Diagnosis from Referral: DDD (degenerative disc disease), lumbar [M51.36], Spondylosis of lumbar region without myelopathy or radiculopathy [M47.816], Lumbar strain, initial encounter [S39.012A]   Evaluation Date: 11/29/2023  Authorization Period Expiration: 11/26/24  Plan of Care Expiration: 5/20/24  Progress Note Due: 3/20/24  Date of Surgery: N/A  Visit # / Visits authorized: 9/ 80   FOTO: 1/ 3     Precautions: Standard, pt is wearing a heart rate monitor     Time In: 10:00  Time Out: 10:50  Total Billable Time: 25 minutes    PTA Visit #: 0/5       Subjective     Patient reports: having a small increase in pain but it is not unbearable   She was compliant with home exercise program.  Response to previous treatment: tolerated well  Functional change: ongoing    Pain: 0 /10  Location: low back/right LE, R groin - pain with certain movements only    Objective      Objective measures taken at progress report unless specified otherwise.     Treatment     Barbara received the treatments listed below:      Bold = performed    therapeutic exercises to develop strength, endurance, ROM, flexibility, posture, and core stabilization for 15 minutes including:  Reassessment measures taken  Recumbent bike 6' Level 2  LTR 2x10  piriformis stretch 2x30"  Hamstring string stretch 3x30"  Supine sciatic nerve glide with ankle pump x20 B  sidelying clamshells with red band 3x10 B  Bridges with green band 2x10  seated ball " "rollouts 10x10"  seated hip internal rotation and external rotation x15 each B       manual therapy techniques: Joint mobilizations, Manual traction, and Soft tissue Mobilization were applied for 0 minutes, including:   hypervolt to right calf  Short axis traction  Hypervolt to glute      neuromuscular re-education activities to improve: Coordination, Kinesthetic, Sense, and Proprioception for 35 minutes. The following activities were included:  Posterior pelvic tilt 20x3"  TrA activation with orange physioball alternating LE 20x3"  Hip adduction isometrics with ball with glute squeeze 20x3"  Hip abduction isometrics with pilates ring 20x3"  +Hooklying alternating marches with TrA isometric x20  supine sciatic nerve glides x20 B   Standing TrA activation pressing into ball 20x3"  Standing marching with pressing into ball X 20  Standing hip abduction pressing into ball x20     therapeutic activities to improve functional performance for 0 minutes, including:  hip hinging with dowel x10  sit to stands with dowel for hip hinge x10  shuttle double leg press 2 bands x10  +Dead lifts with dowel 2x10      Patient Education and Home Exercises       Education provided:   - HEP  - Possibility of delayed onset muscle soreness from starting new exercises    Written Home Exercises Provided: Patient instructed to cont prior HEP. Exercises were reviewed and Barbara was able to demonstrate them prior to the end of the session.  Barbara demonstrated good  understanding of the education provided. See Electronic Medical Record under Patient Instructions for exercises provided during therapy sessions    Assessment     Barbara completed session with no pain in posterior thigh but does endorse fatigue in the anterior thigh. She was able to complete bike at end of session vs start and at level 2 vs level 1 with good challenge. Will continue to progress core strengthening activity and core activation in dynamic situations within her tolerance. Pt " will require training on proper lifting mechanics.     Barbara Is progressing well towards her goals.   Patient prognosis is Good.     Patient will continue to benefit from skilled outpatient physical therapy to address the deficits listed in the problem list box on initial evaluation, provide pt/family education and to maximize pt's level of independence in the home and community environment.     Patient's spiritual, cultural and educational needs considered and pt agreeable to plan of care and goals.     Anticipated barriers to physical therapy: none    GOALS: (not met, progressing unless otherwise specified)  Short Term Goals:  4 weeks - remains in progress  1.Report decreased right sided back and leg pain  < / =  4/10 at worst to increase tolerance for sleeping  2. Increase lumbar flexion range of motion to 0% limited in order to perform ADLs without difficulty.  3. Increase strength by 1/3 MMT grade in bilateral lower extremity to increase tolerance for ADL and work activities.  4. Pt to tolerate HEP to improve ROM and independence with ADL's     Long Term Goals: 8 weeks - remains in progress  1.Report decreased right sided back and leg pain < / = 2/10  to increase tolerance for ADLs  3.Increase strength to 4+/5 in  bilateral lower extremity to increase tolerance for ADL and work activities.  4. Pt will report at CJ level (20-40%) on FOTO  to demonstrate increase in LE function with every day tasks.      Plan     Continue to progress per PT POC    Brittni Guzman, PT

## 2024-03-11 ENCOUNTER — CLINICAL SUPPORT (OUTPATIENT)
Dept: REHABILITATION | Facility: HOSPITAL | Age: 79
End: 2024-03-11
Payer: MEDICARE

## 2024-03-11 DIAGNOSIS — Z74.09 IMPAIRED FUNCTIONAL MOBILITY, BALANCE, GAIT, AND ENDURANCE: Primary | ICD-10-CM

## 2024-03-11 DIAGNOSIS — S39.012A LUMBAR STRAIN, INITIAL ENCOUNTER: ICD-10-CM

## 2024-03-11 DIAGNOSIS — M51.36 DDD (DEGENERATIVE DISC DISEASE), LUMBAR: ICD-10-CM

## 2024-03-11 DIAGNOSIS — M47.816 SPONDYLOSIS OF LUMBAR REGION WITHOUT MYELOPATHY OR RADICULOPATHY: ICD-10-CM

## 2024-03-11 PROCEDURE — 97112 NEUROMUSCULAR REEDUCATION: CPT | Mod: PO

## 2024-03-12 NOTE — PROGRESS NOTES
"[facility]   Physical Therapy Treatment Note     Name: Anette Willis  Clinic Number: [id number]    Therapy Diagnosis:   Encounter Diagnoses   Name Primary?    Impaired functional mobility, balance, gait, and endurance Yes    DDD (degenerative disc disease), lumbar     Spondylosis of lumbar region without myelopathy or radiculopathy     Lumbar strain, initial encounter              Physician: Ruth Johnson NP    Visit Date: 3/13/2024    Physician Orders: PT Eval and Treat   Medical Diagnosis from Referral: DDD (degenerative disc disease), lumbar [M51.36], Spondylosis of lumbar region without myelopathy or radiculopathy [M47.816], Lumbar strain, initial encounter [S39.012A]   Evaluation Date: 11/29/2023  Authorization Period Expiration: 11/26/24  Plan of Care Expiration: 5/20/24  Progress Note Due: 3/20/24  Date of Surgery: N/A  Visit # / Visits authorized: 10/ 80   FOTO: 1/ 3     Precautions: Standard, pt is wearing a heart rate monitor     Time In: 10:00 am  Time Out: 10:43 am  Total Billable Time: 38 minutes    PTA Visit #: 1/5       Subjective     Patient reports: she is having some tightness, but no pain today  She was compliant with home exercise program.  Response to previous treatment: tolerated well  Functional change: ongoing    Pain: 0 /10  Location: low back/right LE, R groin - pain with certain movements only    Objective      Objective measures taken at progress report unless specified otherwise.     Treatment     Barbara received the treatments listed below:      Bold = performed    therapeutic exercises to develop strength, endurance, ROM, flexibility, posture, and core stabilization for 20 minutes including:  Reassessment measures taken  Recumbent bike 6' Level 2  LTR 2x10  piriformis stretch 2x30"  Hamstring string stretch 3x30"  Supine sciatic nerve glide with ankle pump x20 B  sidelying clamshells with red band 3x10 B  Bridges with green band 2x10  seated ball " "rollouts 10x10"  seated hip internal rotation and external rotation x15 each B       manual therapy techniques: Joint mobilizations, Manual traction, and Soft tissue Mobilization were applied for 0 minutes, including:   hypervolt to right calf  Short axis traction  Hypervolt to glute      neuromuscular re-education activities to improve: Coordination, Kinesthetic, Sense, and Proprioception for 23 minutes. The following activities were included:  Posterior pelvic tilt 20x3"  TrA activation with orange physioball alternating LE 20x3"  Hip adduction isometrics with ball with glute squeeze 20x3"  Hip abduction isometrics with pilates ring 20x3"  Hooklying alternating marches with TrA isometric x20  supine sciatic nerve glides x20 B   Standing TrA activation pressing into ball 20x3"  Standing marching with pressing into ball X 20  Standing hip abduction pressing into ball x20     therapeutic activities to improve functional performance for 0 minutes, including:  hip hinging with dowel x10  sit to stands with dowel for hip hinge x10  shuttle double leg press 2 bands x10  +Dead lifts with dowel 2x10      Patient Education and Home Exercises       Education provided:   - HEP  - Possibility of delayed onset muscle soreness from starting new exercises    Written Home Exercises Provided: Patient instructed to cont prior HEP. Exercises were reviewed and Barbara was able to demonstrate them prior to the end of the session.  Barbara demonstrated good  understanding of the education provided. See Electronic Medical Record under Patient Instructions for exercises provided during therapy sessions    Assessment     Barbara presents to treatment with no pain today, but reports of tightness. She tolerated all exercises with no increase in pain. She was instructed on seated sciatic nerve glides for completion at home. Progress as tolerated including training on lifting mechanics.      Barbara Is progressing well towards her goals.   Patient " prognosis is Good.     Patient will continue to benefit from skilled outpatient physical therapy to address the deficits listed in the problem list box on initial evaluation, provide pt/family education and to maximize pt's level of independence in the home and community environment.     Patient's spiritual, cultural and educational needs considered and pt agreeable to plan of care and goals.     Anticipated barriers to physical therapy: none    GOALS: (not met, progressing unless otherwise specified)  Short Term Goals:  4 weeks - remains in progress  1.Report decreased right sided back and leg pain  < / =  4/10 at worst to increase tolerance for sleeping  2. Increase lumbar flexion range of motion to 0% limited in order to perform ADLs without difficulty.  3. Increase strength by 1/3 MMT grade in bilateral lower extremity to increase tolerance for ADL and work activities.  4. Pt to tolerate HEP to improve ROM and independence with ADL's     Long Term Goals: 8 weeks - remains in progress  1.Report decreased right sided back and leg pain < / = 2/10  to increase tolerance for ADLs  3.Increase strength to 4+/5 in  bilateral lower extremity to increase tolerance for ADL and work activities.  4. Pt will report at CJ level (20-40%) on FOTO  to demonstrate increase in LE function with every day tasks.      Plan     Continue to progress per PT JANICE Chapman PTA

## 2024-03-13 ENCOUNTER — CLINICAL SUPPORT (OUTPATIENT)
Dept: REHABILITATION | Facility: HOSPITAL | Age: 79
End: 2024-03-13
Payer: MEDICARE

## 2024-03-13 DIAGNOSIS — M47.816 SPONDYLOSIS OF LUMBAR REGION WITHOUT MYELOPATHY OR RADICULOPATHY: ICD-10-CM

## 2024-03-13 DIAGNOSIS — Z74.09 IMPAIRED FUNCTIONAL MOBILITY, BALANCE, GAIT, AND ENDURANCE: Primary | ICD-10-CM

## 2024-03-13 DIAGNOSIS — S39.012A LUMBAR STRAIN, INITIAL ENCOUNTER: ICD-10-CM

## 2024-03-13 DIAGNOSIS — M51.36 DDD (DEGENERATIVE DISC DISEASE), LUMBAR: ICD-10-CM

## 2024-03-13 PROCEDURE — 97110 THERAPEUTIC EXERCISES: CPT | Mod: PO,CQ

## 2024-03-13 PROCEDURE — 97112 NEUROMUSCULAR REEDUCATION: CPT | Mod: PO,CQ

## 2024-03-14 ENCOUNTER — OFFICE VISIT (OUTPATIENT)
Dept: OPHTHALMOLOGY | Facility: CLINIC | Age: 79
End: 2024-03-14
Payer: MEDICARE

## 2024-03-14 DIAGNOSIS — H43.813 POSTERIOR VITREOUS DETACHMENT, BILATERAL: ICD-10-CM

## 2024-03-14 DIAGNOSIS — H25.13 NS (NUCLEAR SCLEROSIS), BILATERAL: ICD-10-CM

## 2024-03-14 DIAGNOSIS — H35.371 EPIRETINAL MEMBRANE, RIGHT: ICD-10-CM

## 2024-03-14 DIAGNOSIS — H35.3132 NONEXUDATIVE AGE-RELATED MACULAR DEGENERATION, BILATERAL, INTERMEDIATE DRY STAGE: Primary | ICD-10-CM

## 2024-03-14 DIAGNOSIS — H35.54 VITELLIFORM LESION OF MACULA: ICD-10-CM

## 2024-03-14 PROCEDURE — 92004 COMPRE OPH EXAM NEW PT 1/>: CPT | Mod: S$PBB,,, | Performed by: OPHTHALMOLOGY

## 2024-03-14 PROCEDURE — 92201 OPSCPY EXTND RTA DRAW UNI/BI: CPT | Mod: S$PBB,,, | Performed by: OPHTHALMOLOGY

## 2024-03-14 PROCEDURE — 99213 OFFICE O/P EST LOW 20 MIN: CPT | Mod: PBBFAC,25 | Performed by: OPHTHALMOLOGY

## 2024-03-14 PROCEDURE — 99999 PR PBB SHADOW E&M-EST. PATIENT-LVL III: CPT | Mod: PBBFAC,,, | Performed by: OPHTHALMOLOGY

## 2024-03-14 PROCEDURE — 92201 OPSCPY EXTND RTA DRAW UNI/BI: CPT | Mod: PBBFAC | Performed by: OPHTHALMOLOGY

## 2024-03-14 NOTE — PROGRESS NOTES
HPI     NEW PATIENT TO EYE CLINIC      Additional comments: NEW PATIENT TO EYE CLINIC   CNVM   OD   REFERRED  BY DR BUTT    AMD   DRUSEN   WEARS GAS PERM  CTL S   FROM 12 TO 14 HOURS A DAY   DOES NOT SLEEP IN   LENSES    PT  WOULD LIKE TO DISCUSS OR IF YOU KNOW OF ANOTHER OPTOM FOR  GAS PERM   CTL   TO SEE            Comments    DLS 02/16/24  ERM   NEHEMIAS: 2 years ago    (+) Changes in vision: At distance and near  (-) Pain  (-) Irritation   (-) Itching   (-) Flashes  (-) Floaters  (+) Glasses wearer  (+) CL wearer: Patient is wearing gas permeable contact lens  (-) Uses eye gtts    Does patient want a refraction today? Yes    (-) Eye injury  (-) Eye surgery   (+)POHx: Patient states she has the beginning of macular degeneration.   States she takes a vitamin BID.  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 ERM OD  Given above, monitor    3. PVD Ou    4. NS OU  Significant OD>OS  CE eval      1 year OCT

## 2024-03-15 ENCOUNTER — HOSPITAL ENCOUNTER (OUTPATIENT)
Dept: RADIOLOGY | Facility: HOSPITAL | Age: 79
Discharge: HOME | End: 2024-03-15
Attending: INTERNAL MEDICINE
Payer: MEDICARE

## 2024-03-15 DIAGNOSIS — M54.16 LUMBAR RADICULOPATHY, CHRONIC: ICD-10-CM

## 2024-03-15 PROCEDURE — 72148 MRI LUMBAR SPINE W/O DYE: CPT | Mod: 26,,, | Performed by: RADIOLOGY

## 2024-03-15 PROCEDURE — 72148 MRI LUMBAR SPINE W/O DYE: CPT | Mod: TC

## 2024-03-16 ENCOUNTER — PATIENT MESSAGE (OUTPATIENT)
Dept: PRIMARY CARE CLINIC | Facility: CLINIC | Age: 79
End: 2024-03-16
Payer: MEDICARE

## 2024-03-16 DIAGNOSIS — M54.16 LUMBAR RADICULOPATHY: Primary | ICD-10-CM

## 2024-03-18 NOTE — TELEPHONE ENCOUNTER
I think that we should get you scheduled to see pain managemnt to see if injections might be helpful for the pain you are having..  If you are agreeable I can go ahead and put in referral.

## 2024-03-18 NOTE — PROGRESS NOTES
"[facility]   Physical Therapy Treatment Note     Name: Anette Willis  Clinic Number: [id number]    Therapy Diagnosis:   Encounter Diagnoses   Name Primary?    Impaired functional mobility, balance, gait, and endurance Yes    DDD (degenerative disc disease), lumbar     Spondylosis of lumbar region without myelopathy or radiculopathy     Lumbar strain, initial encounter                Physician: Ruth Johnson NP    Visit Date: 3/19/2024    Physician Orders: PT Eval and Treat   Medical Diagnosis from Referral: DDD (degenerative disc disease), lumbar [M51.36], Spondylosis of lumbar region without myelopathy or radiculopathy [M47.816], Lumbar strain, initial encounter [S39.012A]   Evaluation Date: 11/29/2023  Authorization Period Expiration: 11/26/24  Plan of Care Expiration: 5/20/24  Progress Note Due: 3/20/24  Date of Surgery: N/A  Visit # / Visits authorized: 11/ 80   FOTO: 1/ 3     Precautions: Standard, pt is wearing a heart rate monitor     Time In: 11:00  Time Out: 11:50  Total Billable Time: 50 minutes    PTA Visit #: 0/5       Subjective     Patient reports: had an MRI which showed spinal stenosis   She was compliant with home exercise program.  Response to previous treatment: tolerated well  Functional change: ongoing    Pain: 0 /10  Location: low back/right LE, R groin - pain with certain movements only    Objective      Objective measures taken at progress report unless specified otherwise.     Treatment     Barbara received the treatments listed below:      Bold = performed    therapeutic exercises to develop strength, endurance, ROM, flexibility, posture, and core stabilization for 20 minutes including:  Reassessment measures taken  Recumbent bike 6' Level 2  LTR 2x10  piriformis stretch 2x30"  Hamstring string stretch 3x30"  Supine sciatic nerve glide with ankle pump x20 B  sidelying clamshells with red band 3x10 B  Bridges with green band 2x10  seated ball rollouts " "10x10"  seated hip internal rotation and external rotation x15 each B       manual therapy techniques: Joint mobilizations, Manual traction, and Soft tissue Mobilization were applied for 0 minutes, including:   hypervolt to right calf  Short axis traction  Hypervolt to glute      neuromuscular re-education activities to improve: Coordination, Kinesthetic, Sense, and Proprioception for 30 minutes. The following activities were included:  Posterior pelvic tilt 20x3"  TrA activation with orange physioball alternating LE 20x3"  Hip adduction isometrics with ball with glute squeeze 20x3"  Hip abduction isometrics with pilates ring 20x3"  Hooklying alternating marches with TrA isometric x20  supine sciatic nerve glides x20 B   Standing TrA activation pressing into ball 20x3"  Standing marching with pressing into ball X 20  Standing hip abduction pressing into ball x20     therapeutic activities to improve functional performance for 0 minutes, including:  hip hinging with dowel x10  sit to stands with dowel for hip hinge x10  shuttle double leg press 2 bands x10  +Dead lifts with dowel 2x10      Patient Education and Home Exercises       Education provided:   - HEP  - Possibility of delayed onset muscle soreness from starting new exercises    Written Home Exercises Provided: Patient instructed to cont prior HEP. Exercises were reviewed and Barbara was able to demonstrate them prior to the end of the session.  Barbara demonstrated good  understanding of the education provided. See Electronic Medical Record under Patient Instructions for exercises provided during therapy sessions    Assessment     Barbara tolerated session very well today with no increases in pain. We discussed MRI findings that were communicated by her physician and reputable website options for self research. She notes she still feels fearful of movements due to potential increase in pain. Will continue to progress within her tolerance.     Barbara Is progressing well " towards her goals.   Patient prognosis is Good.     Patient will continue to benefit from skilled outpatient physical therapy to address the deficits listed in the problem list box on initial evaluation, provide pt/family education and to maximize pt's level of independence in the home and community environment.     Patient's spiritual, cultural and educational needs considered and pt agreeable to plan of care and goals.     Anticipated barriers to physical therapy: none    GOALS: (not met, progressing unless otherwise specified)  Short Term Goals:  4 weeks - remains in progress  1.Report decreased right sided back and leg pain  < / =  4/10 at worst to increase tolerance for sleeping  2. Increase lumbar flexion range of motion to 0% limited in order to perform ADLs without difficulty.  3. Increase strength by 1/3 MMT grade in bilateral lower extremity to increase tolerance for ADL and work activities.  4. Pt to tolerate HEP to improve ROM and independence with ADL's     Long Term Goals: 8 weeks - remains in progress  1.Report decreased right sided back and leg pain < / = 2/10  to increase tolerance for ADLs  3.Increase strength to 4+/5 in  bilateral lower extremity to increase tolerance for ADL and work activities.  4. Pt will report at CJ level (20-40%) on FOTO  to demonstrate increase in LE function with every day tasks.      Plan     Continue to progress per PT POC    Brittni Guzman, PT

## 2024-03-19 ENCOUNTER — CLINICAL SUPPORT (OUTPATIENT)
Dept: REHABILITATION | Facility: HOSPITAL | Age: 79
End: 2024-03-19
Payer: MEDICARE

## 2024-03-19 DIAGNOSIS — M47.816 SPONDYLOSIS OF LUMBAR REGION WITHOUT MYELOPATHY OR RADICULOPATHY: ICD-10-CM

## 2024-03-19 DIAGNOSIS — M51.36 DDD (DEGENERATIVE DISC DISEASE), LUMBAR: ICD-10-CM

## 2024-03-19 DIAGNOSIS — S39.012A LUMBAR STRAIN, INITIAL ENCOUNTER: ICD-10-CM

## 2024-03-19 DIAGNOSIS — Z74.09 IMPAIRED FUNCTIONAL MOBILITY, BALANCE, GAIT, AND ENDURANCE: Primary | ICD-10-CM

## 2024-03-19 PROCEDURE — 97112 NEUROMUSCULAR REEDUCATION: CPT | Mod: PO

## 2024-03-19 PROCEDURE — 97110 THERAPEUTIC EXERCISES: CPT | Mod: PO

## 2024-03-21 ENCOUNTER — TELEPHONE (OUTPATIENT)
Dept: PAIN MEDICINE | Facility: CLINIC | Age: 79
End: 2024-03-21
Payer: MEDICARE

## 2024-03-21 ENCOUNTER — OFFICE VISIT (OUTPATIENT)
Dept: PAIN MEDICINE | Facility: CLINIC | Age: 79
End: 2024-03-21
Payer: MEDICARE

## 2024-03-21 VITALS
BODY MASS INDEX: 24.91 KG/M2 | DIASTOLIC BLOOD PRESSURE: 40 MMHG | HEIGHT: 66 IN | WEIGHT: 155 LBS | SYSTOLIC BLOOD PRESSURE: 120 MMHG | HEART RATE: 53 BPM

## 2024-03-21 DIAGNOSIS — M51.36 DDD (DEGENERATIVE DISC DISEASE), LUMBAR: ICD-10-CM

## 2024-03-21 DIAGNOSIS — M54.16 LUMBAR RADICULOPATHY: Primary | ICD-10-CM

## 2024-03-21 DIAGNOSIS — M51.36 DDD (DEGENERATIVE DISC DISEASE), LUMBAR: Primary | ICD-10-CM

## 2024-03-21 DIAGNOSIS — M54.16 LUMBAR RADICULOPATHY: ICD-10-CM

## 2024-03-21 PROCEDURE — 99213 OFFICE O/P EST LOW 20 MIN: CPT | Mod: PBBFAC,PO | Performed by: STUDENT IN AN ORGANIZED HEALTH CARE EDUCATION/TRAINING PROGRAM

## 2024-03-21 PROCEDURE — 99999 PR PBB SHADOW E&M-EST. PATIENT-LVL III: CPT | Mod: PBBFAC,,, | Performed by: STUDENT IN AN ORGANIZED HEALTH CARE EDUCATION/TRAINING PROGRAM

## 2024-03-21 PROCEDURE — 99204 OFFICE O/P NEW MOD 45 MIN: CPT | Mod: S$PBB,,, | Performed by: STUDENT IN AN ORGANIZED HEALTH CARE EDUCATION/TRAINING PROGRAM

## 2024-03-21 NOTE — PROGRESS NOTES
[facility] - New Patient Evaluation    Referred by: Dr. Simon Hernandez   Reason for referral: Lumbar radiculopathy     CC:   Chief Complaint   Patient presents with    Leg Pain    Low-back Pain         3/21/2024     2:19 PM 3/21/2024     2:11 PM   Last 3 PDI Scores   Pain Disability Index (PDI) 38 54     Subjective 03/21/24:   Anette Willis is a 79 y.o. female who presents complaining low back pain that radiates down the right lower extremity.  Onset of pain was in November 2023.  No significant falls trauma or history of back surgeries.  Pt reports pain temporarily improved and then worsened again after lifting some heavy boxes.  She has been compliant with physical therapy and does note some improvement with PT.  She tried gabapentin for a few days prescribed back in November but discontinued it after finding no relief.  An MRI of the lumbar spine was significant for degenerative disc changes as well as a right-sided paracentral disc extrusion causing foraminal effacement at the L4-L5 level.  Severe foraminal stenosis at L4-L5 on the right.    Location: low back  Onset: 3-4 months   Current Pain Score: 6/10  Daily Pain of Range: 7-8/10  Quality: [employer]  Radiation: right leg and right thigh  Worsened by: lifting, standing for more than several minutes, walking for more than several minutes, and daily activity  Improved by: heat and sitting    Patient denies night fever/night sweats, urinary incontinence, bowel incontinence, significant weight loss, significant motor weakness, and loss of sensations.    Previous Interventions:  - None    Previous Therapies:  PT/OT: yes - some improvement   Chiropractor:   HEP:   Relevant Surgery: no   Previous Medications:   - NSAIDS:  Avoid due to blood thinners.  - Muscle Relaxants:    - TCAs:   - SNRIs:   - Topicals:   - Anticonvulsants:  Gabapentin 300 mg BID  - Opioids:  Tramadol p.r.n.  - Adjuvants:     Current Pain Medications:  Tramadol  p.r.n.     Review of Systems:  ROS    GENERAL:  No weight loss, malaise or fevers.  HEENT:   No recent changes in vision or hearing  NECK:  No difficulty with swallowing. No stridor.   RESPIRATORY:  Negative for cough, wheezing or shortness of breath, patient denies any recent URI.  CARDIOVASCULAR:  Negative for chest pain, leg swelling or palpitations.  GI:  Negative for abdominal discomfort, blood in stools or black stools or change in bowel habits.  MUSCULOSKELETAL:  See HPI.  SKIN:  Negative for lesions, rash, and itching.  PSYCH:  No mood disorder or recent psychosocial stressors.    HEMATOLOGY/LYMPHOLOGY:  Negative for prolonged bleeding, bruising easily or swollen nodes.  Patient is not currently taking any anti-coagulants  NEURO:   No history of headaches, syncope, paralysis, seizures or tremors.  All other reviewed and negative other than HPI.    History:  Current medications, allergies, medical history, surgical history,   family history, and social history were reviewed in the chart as marked.    Full Medication List:    Current Outpatient Medications:     apixaban (ELIQUIS) 5 mg Tab, Take 1 tablet (5 mg total) by mouth 2 (two) times daily., Disp: 180 tablet, Rfl: 3    flecainide (TAMBOCOR) 100 MG Tab, Take 1 tablet (100 mg total) by mouth every 12 (twelve) hours., Disp: 60 tablet, Rfl: 11    levothyroxine (SYNTHROID) 75 MCG tablet, Take 1 tablet (75 mcg total) by mouth every morning., Disp: 90 tablet, Rfl: 1    losartan (COZAAR) 100 MG tablet, Take 1 tablet (100 mg total) by mouth once daily., Disp: 90 tablet, Rfl: 1    metoprolol succinate (TOPROL-XL) 100 MG 24 hr tablet, Take 1 tablet (100 mg total) by mouth 2 (two) times daily., Disp: 60 tablet, Rfl: 11    rosuvastatin (CRESTOR) 10 MG tablet, Take 1 tablet (10 mg total) by mouth once daily., Disp: 90 tablet, Rfl: 1    traMADoL (ULTRAM) 50 mg tablet, Take 1 tablet (50 mg total) by mouth every 6 (six) hours as needed for Pain. (Patient not taking:  "Reported on 3/21/2024), Disp: 21 tablet, Rfl: 0     Allergies:  Patient has no known allergies.     Medical History:   has no past medical history on file.    Surgical History:   has no past surgical history on file.    Family History:  family history is not on file.    Social History:   reports that she has never smoked. She has never been exposed to tobacco smoke. She has never used smokeless tobacco.    Physical Exam:  Vitals:    03/21/24 1409   BP: (!) 120/40   Pulse: (!) 53   Weight: 70.3 kg (155 lb)   Height: 5' 6" (1.676 m)   PainSc:   6   PainLoc: Back       GENERAL: Well appearing, in no acute distress, alert and oriented x3.  PSYCH:  Mood and affect appropriate.  SKIN: Skin color, texture, turgor normal, no rashes or lesions.  HEAD/FACE:  Normocephalic, atraumatic. Cranial nerves grossly intact.  NECK: Normal ROM. Supple.   CV: RRR with palpation of the radial artery.  PULM: No evidence of respiratory difficulty, symmetric chest rise.  GI:  Soft and non-distended.  MSK: Straight leg raising is negative to radicular pain. No pain to palpation over the facet joints of the lumbar spine. No pain with lumbar facet loading. No pain over the SI joints. IZAIAH test is negative. No obvious deformities, edema, or skin discoloration.  No atrophy or tone abnormalities are noted.   NEURO: Bilateral upper and lower extremity coordination and strength is symmetric.  No loss of sensation is noted.  MENTAL STATUS: A x O x 3, good concentration, speech is fluent and goal directed  MOTOR: 5/5 in bilateral lower extremity muscle groups  GAIT: Normal.  Ambulates unassisted.    Imaging:  MRI LUMBAR SPINE WITHOUT CONTRAST     CLINICAL HISTORY:  Lumbar radiculopathy, symptoms persist with conservative treatment; Radiculopathy, lumbar region     TECHNIQUE:  Multiplanar, multisequence MR images were acquired from the thoracolumbar junction to the sacrum without contrast.     COMPARISON:  None.     FINDINGS:  Alignment: Grade 1 " anterolisthesis at L4-L5.     Vertebrae: No fracture or marrow infiltrative process.  Prominent hemangioma seen within the L4 vertebral body.  Focal signal void within the L3 vertebral body in keeping with bone island.     Discs: Fusion across the L5-S1 disc space.  Moderate disc height loss at L3-L4.  No evidence for discitis.     Cord: Conus terminates at L1 and appears unremarkable.  Cauda equina appears unremarkable.     Degenerative findings:     T12-L1: No spinal canal stenosis or neuroforaminal narrowing.     L1-L2: No spinal canal stenosis or neuroforaminal narrowing.     L2-L3: No spinal canal stenosis or neuroforaminal narrowing.     L3-L4: Circumferential disc bulge and mild facet arthropathy.  No spinal canal stenosis or neural foraminal narrowing.     L4-L5: Circumferential disc bulge and severe facet arthropathy noted.  There is a right paracentral/foraminal zone disc extrusion demonstrating 1.7 cm migration cranial to the inferior endplate of L4. these findings result in severe effacement of the right lateral recess and severe right neural foraminal narrowing.  There is also mild central spinal canal stenosis.     L5-S1: Moderate facet arthropathy results in moderate left neural foraminal narrowing.     Paraspinal muscles & soft tissues: Mild paraspinal muscle atrophy.     Impression:     1. Degenerative changes of the lower lumbar spine detailed above.  Right paracentral/foraminal zone disc extrusion at L4-L5 contributes to severe effacement of the right lateral recess and severe right neural foraminal narrowing.        Electronically signed by: Valentín Hoyt MD  Date:                                            03/15/2024    Labs:  BMP  Lab Results   Component Value Date     03/05/2024    K 4.8 03/05/2024     03/05/2024    CO2 22 (L) 03/05/2024    BUN 13 03/05/2024    CREATININE 0.9 03/05/2024    CALCIUM 9.4 03/05/2024    ANIONGAP 10 03/05/2024    EGFRNORACEVR >60.0 03/05/2024     Lab  Results   Component Value Date    ALT 14 03/05/2024    AST 20 03/05/2024    ALKPHOS 64 03/05/2024    BILITOT 0.5 03/05/2024     Lab Results   Component Value Date    WBC 8.37 03/05/2024    HGB 14.1 03/05/2024    HCT 43.3 03/05/2024    MCV 83 03/05/2024     03/05/2024             Assessment:  Problem List Items Addressed This Visit          Neuro    DDD (degenerative disc disease), lumbar - Primary    Relevant Orders    Procedure Order to Pain Management     Other Visit Diagnoses       Lumbar radiculopathy        Relevant Orders    Procedure Order to Pain Management            03/21/2024- Anette Willis is a 79 y.o. female who  has no past medical history on file.  By history and examination this patient has chronic low back pain with radiculopathy.  The underlying cause is facet arthritis, degenerative disc disease, foraminal stenosis, and central canal stenosis.  Pathology is confirmed by imaging.  MRI lumbar spine was significant for degenerative changes throughout.  There is a right paracentral/foraminal disc extrusion at the L4/5 level which contributes to severe effacement of the right lateral recess causing severe right neural foraminal narrowing.  We discussed the underlying diagnoses and multiple treatment options including non-opioid medications, interventional procedures, physical therapy, home exercise, and core muscle enhancement.  She has been compliant with a physical therapy regimen with some improvement in her pain.  I feel like she would benefit from an L5/S1 interlaminar epidural steroid injection.  She was prescribed gabapentin in November at onset of the pain.  She discontinued this medication after a few days.  I do recommend that she restart it and explained that it works best when taken on a daily basis.  The risks and benefits of each treatment option were discussed and all questions were answered.        Treatment Plan:   Procedures:  Schedule patient for L5/S1 Interlaminar WAN.   Patient is on blood thinners and will need clearance.  PT/OT/HEP: I have stressed the importance of physical activity and a home exercise plan to help with pain and improve health.  Patient is compliant with a physical therapy regimen.  Medications:    - recommend restarting gabapentin 300 mg b.i.d.  - tramadol p.r.n. per her PCP  - She may take OTC Tylenol prn    -  Reviewed and consistent with medication use as prescribed.  Imaging:  MRI lumbar spine was reviewed and discussed with the patient using spine models.  Follow Up: RTC 2 weeks postprocedure    Naomi Santana DO   Interventional Pain Medicine / Anesthesiology    Disclaimer: This note was partly generated using dictation software which may occasionally result in transcription errors.

## 2024-03-21 NOTE — TELEPHONE ENCOUNTER
----- Message from Bear Santana DO sent at 3/21/2024  2:57 PM CDT -----  Regarding: Order for NIKITA LUKE    Patient Name: NIKITA LUKE([id number])  Sex: Female  : 1945      PCP: ROGE SERRATO    Center: None     Types of orders made on 2024: Outpatient Referral, Procedure Request    Order Date:3/21/2024  Ordering User:BEAR SANTANA [[id number]]  Encounter Provider:Bear Santana DO [[id number]]  Authorizing P  munader: Bear Santana DO [[id number]]  Department:[facility] PAIN [facility]NAGEMENT[[id number]]    Common Order Information  Procedure -> Epidural Injection (specify level) Cmt: L5/S1 Interlaminar    Pre-op Diagnosis -> DDD (degenerative disc disease), lumbar       -> Lumbar radiculopathy     Order Specific Information  Order: Procedure Order to Pain Management [Custom: [id number]]  Order #:          [id number]: 1 FUTURE    Priori  ty: Routine  Class: Clinic Performed    Future Order Information      Expires on:2025            Expected by:2024                   Associated Diagnoses      M54.16 Lumbar radiculopathy      M51.36 DDD (degenerative disc disease), lumbar      Physician -> Gelter         Is patient on anti-coagulants? -> Yes Cmt: Eliquis, ASA        Facility Name: -> [facility]         Follow-up: -> 2 weeks Cmt: Jose Owens            Priority: Routine  Class: Clinic Performed    Future Order Information      Expires on:2025            Expected by:2024                   Associated Diagnoses      M54.16 Lumbar radiculopathy      M51.36 DDD (degenerative disc disease), lumbar      Procedure -> Epidural Injection (specify level) Cmt: L5/S1 Interlaminar        Physician -> Gelter         Is patient on anti-coagulants? -> Yes Cmt:   Eliquis, ASA        Pre-op Diagnosis -> DDD (degenerative disc disease), lumbar           -> Lumbar radiculopathy         Facility Name: -> [facility]         Follow-up: -> 2 weeks Cmt: Jose Owens

## 2024-03-21 NOTE — H&P (VIEW-ONLY)
[facility] - New Patient Evaluation    Referred by: Dr. iSmon Hernandez   Reason for referral: Lumbar radiculopathy     CC:   Chief Complaint   Patient presents with    Leg Pain    Low-back Pain         3/21/2024     2:19 PM 3/21/2024     2:11 PM   Last 3 PDI Scores   Pain Disability Index (PDI) 38 54     Subjective 03/21/24:   Anette Willis is a 79 y.o. female who presents complaining low back pain that radiates down the right lower extremity.  Onset of pain was in November 2023.  No significant falls trauma or history of back surgeries.  Pt reports pain temporarily improved and then worsened again after lifting some heavy boxes.  She has been compliant with physical therapy and does note some improvement with PT.  She tried gabapentin for a few days prescribed back in November but discontinued it after finding no relief.  An MRI of the lumbar spine was significant for degenerative disc changes as well as a right-sided paracentral disc extrusion causing foraminal effacement at the L4-L5 level.  Severe foraminal stenosis at L4-L5 on the right.    Location: low back  Onset: 3-4 months   Current Pain Score: 6/10  Daily Pain of Range: 7-8/10  Quality: [employer]  Radiation: right leg and right thigh  Worsened by: lifting, standing for more than several minutes, walking for more than several minutes, and daily activity  Improved by: heat and sitting    Patient denies night fever/night sweats, urinary incontinence, bowel incontinence, significant weight loss, significant motor weakness, and loss of sensations.    Previous Interventions:  - None    Previous Therapies:  PT/OT: yes - some improvement   Chiropractor:   HEP:   Relevant Surgery: no   Previous Medications:   - NSAIDS:  Avoid due to blood thinners.  - Muscle Relaxants:    - TCAs:   - SNRIs:   - Topicals:   - Anticonvulsants:  Gabapentin 300 mg BID  - Opioids:  Tramadol p.r.n.  - Adjuvants:     Current Pain Medications:  Tramadol  p.r.n.     Review of Systems:  ROS    GENERAL:  No weight loss, malaise or fevers.  HEENT:   No recent changes in vision or hearing  NECK:  No difficulty with swallowing. No stridor.   RESPIRATORY:  Negative for cough, wheezing or shortness of breath, patient denies any recent URI.  CARDIOVASCULAR:  Negative for chest pain, leg swelling or palpitations.  GI:  Negative for abdominal discomfort, blood in stools or black stools or change in bowel habits.  MUSCULOSKELETAL:  See HPI.  SKIN:  Negative for lesions, rash, and itching.  PSYCH:  No mood disorder or recent psychosocial stressors.    HEMATOLOGY/LYMPHOLOGY:  Negative for prolonged bleeding, bruising easily or swollen nodes.  Patient is not currently taking any anti-coagulants  NEURO:   No history of headaches, syncope, paralysis, seizures or tremors.  All other reviewed and negative other than HPI.    History:  Current medications, allergies, medical history, surgical history,   family history, and social history were reviewed in the chart as marked.    Full Medication List:    Current Outpatient Medications:     apixaban (ELIQUIS) 5 mg Tab, Take 1 tablet (5 mg total) by mouth 2 (two) times daily., Disp: 180 tablet, Rfl: 3    flecainide (TAMBOCOR) 100 MG Tab, Take 1 tablet (100 mg total) by mouth every 12 (twelve) hours., Disp: 60 tablet, Rfl: 11    levothyroxine (SYNTHROID) 75 MCG tablet, Take 1 tablet (75 mcg total) by mouth every morning., Disp: 90 tablet, Rfl: 1    losartan (COZAAR) 100 MG tablet, Take 1 tablet (100 mg total) by mouth once daily., Disp: 90 tablet, Rfl: 1    metoprolol succinate (TOPROL-XL) 100 MG 24 hr tablet, Take 1 tablet (100 mg total) by mouth 2 (two) times daily., Disp: 60 tablet, Rfl: 11    rosuvastatin (CRESTOR) 10 MG tablet, Take 1 tablet (10 mg total) by mouth once daily., Disp: 90 tablet, Rfl: 1    traMADoL (ULTRAM) 50 mg tablet, Take 1 tablet (50 mg total) by mouth every 6 (six) hours as needed for Pain. (Patient not taking:  "Reported on 3/21/2024), Disp: 21 tablet, Rfl: 0     Allergies:  Patient has no known allergies.     Medical History:   has no past medical history on file.    Surgical History:   has no past surgical history on file.    Family History:  family history is not on file.    Social History:   reports that she has never smoked. She has never been exposed to tobacco smoke. She has never used smokeless tobacco.    Physical Exam:  Vitals:    03/21/24 1409   BP: (!) 120/40   Pulse: (!) 53   Weight: 70.3 kg (155 lb)   Height: 5' 6" (1.676 m)   PainSc:   6   PainLoc: Back       GENERAL: Well appearing, in no acute distress, alert and oriented x3.  PSYCH:  Mood and affect appropriate.  SKIN: Skin color, texture, turgor normal, no rashes or lesions.  HEAD/FACE:  Normocephalic, atraumatic. Cranial nerves grossly intact.  NECK: Normal ROM. Supple.   CV: RRR with palpation of the radial artery.  PULM: No evidence of respiratory difficulty, symmetric chest rise.  GI:  Soft and non-distended.  MSK: Straight leg raising is negative to radicular pain. No pain to palpation over the facet joints of the lumbar spine. No pain with lumbar facet loading. No pain over the SI joints. IZAIAH test is negative. No obvious deformities, edema, or skin discoloration.  No atrophy or tone abnormalities are noted.   NEURO: Bilateral upper and lower extremity coordination and strength is symmetric.  No loss of sensation is noted.  MENTAL STATUS: A x O x 3, good concentration, speech is fluent and goal directed  MOTOR: 5/5 in bilateral lower extremity muscle groups  GAIT: Normal.  Ambulates unassisted.    Imaging:  MRI LUMBAR SPINE WITHOUT CONTRAST     CLINICAL HISTORY:  Lumbar radiculopathy, symptoms persist with conservative treatment; Radiculopathy, lumbar region     TECHNIQUE:  Multiplanar, multisequence MR images were acquired from the thoracolumbar junction to the sacrum without contrast.     COMPARISON:  None.     FINDINGS:  Alignment: Grade 1 " anterolisthesis at L4-L5.     Vertebrae: No fracture or marrow infiltrative process.  Prominent hemangioma seen within the L4 vertebral body.  Focal signal void within the L3 vertebral body in keeping with bone island.     Discs: Fusion across the L5-S1 disc space.  Moderate disc height loss at L3-L4.  No evidence for discitis.     Cord: Conus terminates at L1 and appears unremarkable.  Cauda equina appears unremarkable.     Degenerative findings:     T12-L1: No spinal canal stenosis or neuroforaminal narrowing.     L1-L2: No spinal canal stenosis or neuroforaminal narrowing.     L2-L3: No spinal canal stenosis or neuroforaminal narrowing.     L3-L4: Circumferential disc bulge and mild facet arthropathy.  No spinal canal stenosis or neural foraminal narrowing.     L4-L5: Circumferential disc bulge and severe facet arthropathy noted.  There is a right paracentral/foraminal zone disc extrusion demonstrating 1.7 cm migration cranial to the inferior endplate of L4. these findings result in severe effacement of the right lateral recess and severe right neural foraminal narrowing.  There is also mild central spinal canal stenosis.     L5-S1: Moderate facet arthropathy results in moderate left neural foraminal narrowing.     Paraspinal muscles & soft tissues: Mild paraspinal muscle atrophy.     Impression:     1. Degenerative changes of the lower lumbar spine detailed above.  Right paracentral/foraminal zone disc extrusion at L4-L5 contributes to severe effacement of the right lateral recess and severe right neural foraminal narrowing.        Electronically signed by: Valentín Hoyt MD  Date:                                            03/15/2024    Labs:  BMP  Lab Results   Component Value Date     03/05/2024    K 4.8 03/05/2024     03/05/2024    CO2 22 (L) 03/05/2024    BUN 13 03/05/2024    CREATININE 0.9 03/05/2024    CALCIUM 9.4 03/05/2024    ANIONGAP 10 03/05/2024    EGFRNORACEVR >60.0 03/05/2024     Lab  Results   Component Value Date    ALT 14 03/05/2024    AST 20 03/05/2024    ALKPHOS 64 03/05/2024    BILITOT 0.5 03/05/2024     Lab Results   Component Value Date    WBC 8.37 03/05/2024    HGB 14.1 03/05/2024    HCT 43.3 03/05/2024    MCV 83 03/05/2024     03/05/2024             Assessment:  Problem List Items Addressed This Visit          Neuro    DDD (degenerative disc disease), lumbar - Primary    Relevant Orders    Procedure Order to Pain Management     Other Visit Diagnoses       Lumbar radiculopathy        Relevant Orders    Procedure Order to Pain Management            03/21/2024- Anette Willis is a 79 y.o. female who  has no past medical history on file.  By history and examination this patient has chronic low back pain with radiculopathy.  The underlying cause is facet arthritis, degenerative disc disease, foraminal stenosis, and central canal stenosis.  Pathology is confirmed by imaging.  MRI lumbar spine was significant for degenerative changes throughout.  There is a right paracentral/foraminal disc extrusion at the L4/5 level which contributes to severe effacement of the right lateral recess causing severe right neural foraminal narrowing.  We discussed the underlying diagnoses and multiple treatment options including non-opioid medications, interventional procedures, physical therapy, home exercise, and core muscle enhancement.  She has been compliant with a physical therapy regimen with some improvement in her pain.  I feel like she would benefit from an L5/S1 interlaminar epidural steroid injection.  She was prescribed gabapentin in November at onset of the pain.  She discontinued this medication after a few days.  I do recommend that she restart it and explained that it works best when taken on a daily basis.  The risks and benefits of each treatment option were discussed and all questions were answered.        Treatment Plan:   Procedures:  Schedule patient for L5/S1 Interlaminar WAN.   Patient is on blood thinners and will need clearance.  PT/OT/HEP: I have stressed the importance of physical activity and a home exercise plan to help with pain and improve health.  Patient is compliant with a physical therapy regimen.  Medications:    - recommend restarting gabapentin 300 mg b.i.d.  - tramadol p.r.n. per her PCP  - She may take OTC Tylenol prn    -  Reviewed and consistent with medication use as prescribed.  Imaging:  MRI lumbar spine was reviewed and discussed with the patient using spine models.  Follow Up: RTC 2 weeks postprocedure    Naomi Santana DO   Interventional Pain Medicine / Anesthesiology    Disclaimer: This note was partly generated using dictation software which may occasionally result in transcription errors.

## 2024-03-21 NOTE — PROGRESS NOTES
"[facility]   Physical Therapy Treatment Note     Name: Anette Willis  Clinic Number: [id number]    Therapy Diagnosis:   Encounter Diagnoses   Name Primary?    Impaired functional mobility, balance, gait, and endurance Yes    DDD (degenerative disc disease), lumbar     Spondylosis of lumbar region without myelopathy or radiculopathy     Lumbar strain, initial encounter                  Physician: Ruth Johnson NP    Visit Date: 3/22/2024    Physician Orders: PT Eval and Treat   Medical Diagnosis from Referral: DDD (degenerative disc disease), lumbar [M51.36], Spondylosis of lumbar region without myelopathy or radiculopathy [M47.816], Lumbar strain, initial encounter [S39.012A]   Evaluation Date: 11/29/2023  Authorization Period Expiration: 11/26/24  Plan of Care Expiration: 5/20/24  Progress Note Due: 3/20/24  Date of Surgery: N/A  Visit # / Visits authorized: 12/ 80   FOTO: 1/ 3     Precautions: Standard, pt is wearing a heart rate monitor     Time In: 10:00 am  Time Out: 10:40 am  Total Billable Time: 40 minutes    PTA Visit #: 1/5       Subjective     Patient reports: she is scheduled for an injection on 4/1  She was compliant with home exercise program.  Response to previous treatment: tolerated well  Functional change: ongoing    Pain: 0 /10  Location: low back/right LE, R groin - pain with certain movements only    Objective      Objective measures taken at progress report unless specified otherwise.     Treatment     Barbara received the treatments listed below:      Bold = performed    therapeutic exercises to develop strength, endurance, ROM, flexibility, posture, and core stabilization for 17 minutes including:  Reassessment measures taken  Recumbent bike 6' Level 2  LTR 2x10  piriformis stretch 2x30"  Hamstring string stretch 3x30"  Supine sciatic nerve glide with ankle pump x20 B  sidelying clamshells with red band 3x10 B  Bridges with green band 2x10  seated ball rollouts " "10x10"  seated hip internal rotation and external rotation x15 each B       manual therapy techniques: Joint mobilizations, Manual traction, and Soft tissue Mobilization were applied for 0 minutes, including:   hypervolt to right calf  Short axis traction  Hypervolt to glute      neuromuscular re-education activities to improve: Coordination, Kinesthetic, Sense, and Proprioception for 23 minutes. The following activities were included:  Posterior pelvic tilt 20x3"  TrA activation with orange physioball alternating LE 20x3"  Hip adduction isometrics with ball with glute squeeze 20x3"  Hip abduction isometrics with pilates ring 20x3"  Hooklying alternating marches with TrA isometric x20  supine sciatic nerve glides x20 B   Standing TrA activation pressing into ball 20x3"  Standing marching with pressing into ball X 20  Standing hip abduction pressing into ball x20     therapeutic activities to improve functional performance for 0 minutes, including:  hip hinging with dowel x10  sit to stands with dowel for hip hinge x10  shuttle double leg press 2 bands x10  +Dead lifts with dowel 2x10      Patient Education and Home Exercises       Education provided:   - HEP  - Possibility of delayed onset muscle soreness from starting new exercises    Written Home Exercises Provided: Patient instructed to cont prior HEP. Exercises were reviewed and Barbara was able to demonstrate them prior to the end of the session.  Barbara demonstrated good  understanding of the education provided. See Electronic Medical Record under Patient Instructions for exercises provided during therapy sessions    Assessment     Barbara reports she is scheduled for an injection on 4/1/24. She was able to tolerate exercises as noted with no increase in pain. She did fatigue quickly with standing exercises requiring seated rest breaks to complete. Continue to progress as tolerated for remaining scheduled visits.      Barbara Is progressing well towards her goals. "   Patient prognosis is Good.     Patient will continue to benefit from skilled outpatient physical therapy to address the deficits listed in the problem list box on initial evaluation, provide pt/family education and to maximize pt's level of independence in the home and community environment.     Patient's spiritual, cultural and educational needs considered and pt agreeable to plan of care and goals.     Anticipated barriers to physical therapy: none    GOALS: (not met, progressing unless otherwise specified)  Short Term Goals:  4 weeks - remains in progress  1.Report decreased right sided back and leg pain  < / =  4/10 at worst to increase tolerance for sleeping  2. Increase lumbar flexion range of motion to 0% limited in order to perform ADLs without difficulty.  3. Increase strength by 1/3 MMT grade in bilateral lower extremity to increase tolerance for ADL and work activities.  4. Pt to tolerate HEP to improve ROM and independence with ADL's     Long Term Goals: 8 weeks - remains in progress  1.Report decreased right sided back and leg pain < / = 2/10  to increase tolerance for ADLs  3.Increase strength to 4+/5 in  bilateral lower extremity to increase tolerance for ADL and work activities.  4. Pt will report at CJ level (20-40%) on FOTO  to demonstrate increase in LE function with every day tasks.      Plan     Continue to progress per PT JANICE Chapman PTA

## 2024-03-22 ENCOUNTER — OFFICE VISIT (OUTPATIENT)
Dept: OPHTHALMOLOGY | Facility: CLINIC | Age: 79
End: 2024-03-22
Payer: MEDICARE

## 2024-03-22 ENCOUNTER — CLINICAL SUPPORT (OUTPATIENT)
Dept: REHABILITATION | Facility: HOSPITAL | Age: 79
End: 2024-03-22
Payer: MEDICARE

## 2024-03-22 ENCOUNTER — TELEPHONE (OUTPATIENT)
Dept: PAIN MEDICINE | Facility: CLINIC | Age: 79
End: 2024-03-22
Payer: MEDICARE

## 2024-03-22 DIAGNOSIS — H35.54 VITELLIFORM LESION OF MACULA: ICD-10-CM

## 2024-03-22 DIAGNOSIS — H35.371 EPIRETINAL MEMBRANE, RIGHT: ICD-10-CM

## 2024-03-22 DIAGNOSIS — H35.3132 NONEXUDATIVE AGE-RELATED MACULAR DEGENERATION, BILATERAL, INTERMEDIATE DRY STAGE: Primary | ICD-10-CM

## 2024-03-22 DIAGNOSIS — Z74.09 IMPAIRED FUNCTIONAL MOBILITY, BALANCE, GAIT, AND ENDURANCE: Primary | ICD-10-CM

## 2024-03-22 DIAGNOSIS — S39.012A LUMBAR STRAIN, INITIAL ENCOUNTER: ICD-10-CM

## 2024-03-22 DIAGNOSIS — M51.36 DDD (DEGENERATIVE DISC DISEASE), LUMBAR: ICD-10-CM

## 2024-03-22 DIAGNOSIS — M47.816 SPONDYLOSIS OF LUMBAR REGION WITHOUT MYELOPATHY OR RADICULOPATHY: ICD-10-CM

## 2024-03-22 DIAGNOSIS — H25.12 NUCLEAR SCLEROTIC CATARACT OF LEFT EYE: ICD-10-CM

## 2024-03-22 DIAGNOSIS — H25.11 NUCLEAR SCLEROTIC CATARACT OF RIGHT EYE: ICD-10-CM

## 2024-03-22 DIAGNOSIS — H43.813 POSTERIOR VITREOUS DETACHMENT, BILATERAL: ICD-10-CM

## 2024-03-22 PROCEDURE — 92134 CPTRZ OPH DX IMG PST SGM RTA: CPT | Mod: PBBFAC | Performed by: OPHTHALMOLOGY

## 2024-03-22 PROCEDURE — 99212 OFFICE O/P EST SF 10 MIN: CPT | Mod: PBBFAC | Performed by: OPHTHALMOLOGY

## 2024-03-22 PROCEDURE — 92136 OPHTHALMIC BIOMETRY: CPT | Mod: PBBFAC,RT | Performed by: OPHTHALMOLOGY

## 2024-03-22 PROCEDURE — 97112 NEUROMUSCULAR REEDUCATION: CPT | Mod: PO,CQ

## 2024-03-22 PROCEDURE — 99999 PR PBB SHADOW E&M-EST. PATIENT-LVL II: CPT | Mod: PBBFAC,,, | Performed by: OPHTHALMOLOGY

## 2024-03-22 PROCEDURE — 99215 OFFICE O/P EST HI 40 MIN: CPT | Mod: S$PBB,,, | Performed by: OPHTHALMOLOGY

## 2024-03-22 PROCEDURE — 97110 THERAPEUTIC EXERCISES: CPT | Mod: PO,CQ

## 2024-03-22 NOTE — TELEPHONE ENCOUNTER
----- Message from Neil Dubois MD sent at 3/22/2024  2:39 PM CDT -----  Regarding: RE: Clearance  That would be fine.    Dr. Dubois  ----- Message -----  From: Antonella Kline  Sent: 3/22/2024  10:06 AM CDT  To: Neil Dubois MD; #  Subject: Clearance                                        Good Morning,     Patient is scheduled for WAN L5/S1 on April 1 with Dr. Santana. Requesting for clearance to be off Eliquis for 3 days and ASA for 5 days.    Thanks,    Antonella

## 2024-03-22 NOTE — PROGRESS NOTES
Assessment /Plan     For exam results, see Encounter Report.    Nonexudative age-related macular degeneration, bilateral, intermediate dry stage  -     Posterior Segment OCT Retina-Both eyes    Posterior vitreous detachment, bilateral    Epiretinal membrane, right    Vitelliform lesion of macula    Nuclear sclerotic cataract of right eye  -     IOL Master - OD - Right Eye    Nuclear sclerotic cataract of left eye      Visually significant nuclear sclerotic cataract   - Interfering with activities of daily living.  Pt desires cataract surgery for [facility] rehabilitation.   - R/B/A discussed and pt agrees to proceed with surgery.   - IOL options discussed according to patient's goals and concomitant ocular pathology; and pt content with monofocal lens.    - Target: plano.    Diboo OD    (Diboo OS)    Wears RGPs = needs CL holiday prior to calcs/ mumtaz OU    High myopia.

## 2024-03-25 ENCOUNTER — TELEPHONE (OUTPATIENT)
Dept: PAIN MEDICINE | Facility: CLINIC | Age: 79
End: 2024-03-25
Payer: MEDICARE

## 2024-03-25 ENCOUNTER — TELEPHONE (OUTPATIENT)
Dept: OPHTHALMOLOGY | Facility: CLINIC | Age: 79
End: 2024-03-25
Payer: MEDICARE

## 2024-03-25 DIAGNOSIS — H25.11 NUCLEAR SCLEROTIC CATARACT OF RIGHT EYE: Primary | ICD-10-CM

## 2024-03-25 RX ORDER — PREDNISOLONE ACETATE 10 MG/ML
1 SUSPENSION/ DROPS OPHTHALMIC 3 TIMES DAILY
Qty: 5 ML | Refills: 3 | Status: SHIPPED | OUTPATIENT
Start: 2024-03-25

## 2024-03-25 RX ORDER — KETOROLAC TROMETHAMINE 5 MG/ML
1 SOLUTION OPHTHALMIC 3 TIMES DAILY
Qty: 5 ML | Refills: 3 | Status: SHIPPED | OUTPATIENT
Start: 2024-03-25

## 2024-03-25 RX ORDER — OFLOXACIN 3 MG/ML
1 SOLUTION/ DROPS OPHTHALMIC 3 TIMES DAILY
Qty: 5 ML | Refills: 3 | Status: SHIPPED | OUTPATIENT
Start: 2024-03-25

## 2024-03-25 NOTE — PROGRESS NOTES
"[facility]   Physical Therapy Treatment Note     Name: Anette Willis  Clinic Number: [id number]    Therapy Diagnosis:   Encounter Diagnoses   Name Primary?    Impaired functional mobility, balance, gait, and endurance Yes    DDD (degenerative disc disease), lumbar     Spondylosis of lumbar region without myelopathy or radiculopathy     Lumbar strain, initial encounter                  Physician: Ruth Johnson NP    Visit Date: 3/26/2024    Physician Orders: PT Eval and Treat   Medical Diagnosis from Referral: DDD (degenerative disc disease), lumbar [M51.36], Spondylosis of lumbar region without myelopathy or radiculopathy [M47.816], Lumbar strain, initial encounter [S39.012A]   Evaluation Date: 11/29/2023  Authorization Period Expiration: 11/26/24  Plan of Care Expiration: 5/20/24  Progress Note Due: 3/20/24  Date of Surgery: N/A  Visit # / Visits authorized: 13/ 80   FOTO: 1/ 3     Precautions: Standard, pt is wearing a heart rate monitor     Time In: 10:00 am  Time Out: 10:40 am  Total Billable Time: 25 minutes one on one    PTA Visit #: 2/5       Subjective     Patient reports: she wants to know what the percentage is of people who get relief after the epidurals  She was compliant with home exercise program.  Response to previous treatment: tolerated well  Functional change: ongoing    Pain: 0 /10  Location: low back/right LE, R groin - pain with certain movements only    Objective      Objective measures taken at progress report unless specified otherwise.     Treatment     Barbara received the treatments listed below:      Bold = performed    therapeutic exercises to develop strength, endurance, ROM, flexibility, posture, and core stabilization for 15 minutes including:  Reassessment measures taken  Recumbent bike 6' Level 2  LTR 2x10  piriformis stretch 2x30"  Hamstring string stretch 3x30"  Supine sciatic nerve glide with ankle pump x20 B  sidelying clamshells with red band 3x10 " "B  Bridges with green band 2x10  seated ball rollouts 10x10"  seated hip internal rotation and external rotation x15 each B       manual therapy techniques: Joint mobilizations, Manual traction, and Soft tissue Mobilization were applied for 0 minutes, including:   hypervolt to right calf  Short axis traction  Hypervolt to glute      neuromuscular re-education activities to improve: Coordination, Kinesthetic, Sense, and Proprioception for 25 minutes. The following activities were included:  Posterior pelvic tilt 20x3"  TrA activation with orange physioball alternating LE 20x3"  Hip adduction isometrics with ball with glute squeeze 20x3"  Hip abduction isometrics with pilates ring 20x3"  Hooklying alternating marches with TrA isometric x20  supine sciatic nerve glides x20 B   Standing TrA activation pressing into ball 20x3"  Standing marching with pressing into ball X 20  Standing hip abduction pressing into ball x20     therapeutic activities to improve functional performance for 0 minutes, including:  hip hinging with dowel x10  sit to stands with dowel for hip hinge x10  shuttle double leg press 2 bands x10  +Dead lifts with dowel 2x10      Patient Education and Home Exercises       Education provided:   - HEP  - Possibility of delayed onset muscle soreness from starting new exercises    Written Home Exercises Provided: Patient instructed to cont prior HEP. Exercises were reviewed and Barbraa was able to demonstrate them prior to the end of the session.  Barbara demonstrated good  understanding of the education provided. See Electronic Medical Record under Patient Instructions for exercises provided during therapy sessions    Assessment     Barbara reports being nervous about her upcoming epidural and wants to know what the likelihood of her getting relief will be. Instructed her to discuss this with her MD. She was able to tolerate exercises with some discomfort noted with standing exercises. Cueing required for proper " core activation. Continue to progress as tolerated.     Barbara Is progressing well towards her goals.   Patient prognosis is Good.     Patient will continue to benefit from skilled outpatient physical therapy to address the deficits listed in the problem list box on initial evaluation, provide pt/family education and to maximize pt's level of independence in the home and community environment.     Patient's spiritual, cultural and educational needs considered and pt agreeable to plan of care and goals.     Anticipated barriers to physical therapy: none    GOALS: (not met, progressing unless otherwise specified)  Short Term Goals:  4 weeks - remains in progress  1.Report decreased right sided back and leg pain  < / =  4/10 at worst to increase tolerance for sleeping  2. Increase lumbar flexion range of motion to 0% limited in order to perform ADLs without difficulty.  3. Increase strength by 1/3 MMT grade in bilateral lower extremity to increase tolerance for ADL and work activities.  4. Pt to tolerate HEP to improve ROM and independence with ADL's     Long Term Goals: 8 weeks - remains in progress  1.Report decreased right sided back and leg pain < / = 2/10  to increase tolerance for ADLs  3.Increase strength to 4+/5 in  bilateral lower extremity to increase tolerance for ADL and work activities.  4. Pt will report at CJ level (20-40%) on FOTO  to demonstrate increase in LE function with every day tasks.      Plan     Continue to progress per PT JANICE Chapman PTA

## 2024-03-26 ENCOUNTER — CLINICAL SUPPORT (OUTPATIENT)
Dept: REHABILITATION | Facility: HOSPITAL | Age: 79
End: 2024-03-26
Payer: MEDICARE

## 2024-03-26 DIAGNOSIS — M51.36 DDD (DEGENERATIVE DISC DISEASE), LUMBAR: ICD-10-CM

## 2024-03-26 DIAGNOSIS — S39.012A LUMBAR STRAIN, INITIAL ENCOUNTER: ICD-10-CM

## 2024-03-26 DIAGNOSIS — M47.816 SPONDYLOSIS OF LUMBAR REGION WITHOUT MYELOPATHY OR RADICULOPATHY: ICD-10-CM

## 2024-03-26 DIAGNOSIS — Z74.09 IMPAIRED FUNCTIONAL MOBILITY, BALANCE, GAIT, AND ENDURANCE: Primary | ICD-10-CM

## 2024-03-26 PROCEDURE — 97110 THERAPEUTIC EXERCISES: CPT | Mod: PO,CQ

## 2024-03-26 PROCEDURE — 97112 NEUROMUSCULAR REEDUCATION: CPT | Mod: PO,CQ

## 2024-03-27 NOTE — PROGRESS NOTES
[facility]   Physical Therapy Treatment Note/ Reassessment     Name: Anette Willis  Clinic Number: [id number]    Therapy Diagnosis:   Encounter Diagnoses   Name Primary?    Impaired functional mobility, balance, gait, and endurance Yes    DDD (degenerative disc disease), lumbar     Spondylosis of lumbar region without myelopathy or radiculopathy     Lumbar strain, initial encounter                    Physician: Ruth Johnson NP    Visit Date: 3/28/2024    Physician Orders: PT Eval and Treat   Medical Diagnosis from Referral: DDD (degenerative disc disease), lumbar [M51.36], Spondylosis of lumbar region without myelopathy or radiculopathy [M47.816], Lumbar strain, initial encounter [S39.012A]   Evaluation Date: 11/29/2023  Authorization Period Expiration: 11/26/24  Plan of Care Expiration: 5/20/24  Progress Note Due: 4/28/24  Date of Surgery: N/A  Visit # / Visits authorized: 14/ 80   FOTO: 1/ 3     Precautions: Standard, pt is wearing a heart rate monitor     Time In: 10:00  Time Out: 10:48  Total Billable Time: 25 minutes one on one    PTA Visit #: 0/5       Subjective     Patient reports: very careful not to put a lot of weight through the leg   She was compliant with home exercise program.  Response to previous treatment: tolerated well  Functional change: ongoing    Pain: 1 /10  Location: low back/right LE, R groin - pain with certain movements only    Objective      Observation: enters clinic ambulating independently with single point cane      Posture:  forward head, rounded shoulders     Lumbar Range of Motion:     Limitations Pain   Flexion Unable to assess    This is what initiated the pain          Extension 0%    no         Left Side Bending 0% no         Right Side Bending 0% no            Rotation: full and pain free bilaterally      Lower Extremity Strength  Right LE   Left LE     Quadriceps: 5/5 Quadriceps: 5/5   Hamstrings: 5/5 Hamstrings: 5/5   Iliospoas: 4/5  "Iliospoas: 4/5   Hip extension:  4/5 Hip extension: 4/5   PGM: 4-/5 PGM: 4-/5   Hip ER:  4/5 Hip ER: 4/5   Hip IR: 4/5 Hip IR: 4/5   Ankle dorsiflexion: 5/5 Ankle dorsiflexion: 5/5         Special Tests:  -SLR Test: positive R; negative L   -Repeated Flexion: pain not present currently  -Repeated Ext: pain not present currently   -Slump Test: negative bilaterally         Joint Mobility: hypomobile lumbar segmental mobility        Palpation: tenderness at posterior greater trochanter        Flexibility:      -Hamstring : R = mod limitation ; L = mod limitation     Jagdish Test Right  Left    Iliopsoas - -   Rectus Femoris  - -               Intake Outcome Measure for FOTO lumbar Survey     Therapist reviewed FOTO scores for Anette Willis on 2/20/24   FOTO report - see Media section or FOTO account episode details.     Intake Score: 32%           Treatment     Barbara received the treatments listed below:      Bold = performed    therapeutic exercises to develop strength, endurance, ROM, flexibility, posture, and core stabilization for 20 minutes including:  Reassessment measures taken  Recumbent bike 6' Level 2  LTR 2x10  piriformis stretch 2x30"  Hamstring string stretch 3x30"  Supine sciatic nerve glide with ankle pump x20 B  sidelying clamshells with red band 3x10 B  Bridges with green band 2x10  seated ball rollouts 10x10"  seated hip internal rotation and external rotation x15 each B       manual therapy techniques: Joint mobilizations, Manual traction, and Soft tissue Mobilization were applied for 0 minutes, including:   hypervolt to right calf  Short axis traction  Hypervolt to glute      neuromuscular re-education activities to improve: Coordination, Kinesthetic, Sense, and Proprioception for 28 minutes. The following activities were included:  Posterior pelvic tilt 20x3"  TrA activation with orange physioball alternating LE 20x3"  Hip adduction isometrics with ball with glute squeeze 20x3"  Hip abduction " "isometrics with pilates ring 20x3"  Hooklying alternating marches with TrA isometric x20  supine sciatic nerve glides x20 B   Standing TrA activation pressing into ball 20x3"  Standing marching with pressing into ball X 20  Standing hip abduction pressing into ball x20     therapeutic activities to improve functional performance for 0 minutes, including:  hip hinging with dowel x10  sit to stands with dowel for hip hinge x10  shuttle double leg press 2 bands x10  +Dead lifts with dowel 2x10      Patient Education and Home Exercises       Education provided:   - HEP  - Possibility of delayed onset muscle soreness from starting new exercises    Written Home Exercises Provided: Patient instructed to cont prior HEP. Exercises were reviewed and Barbara was able to demonstrate them prior to the end of the session.  Barbara demonstrated good  understanding of the education provided. See Electronic Medical Record under Patient Instructions for exercises provided during therapy sessions    Assessment   Barbara tolerated session well today with no increases in pain. Discussed holding progressions until after injections to prevent any additional flare ups. She will receive injection on Monday and then continue with PT following.     Barabra Is progressing well towards her goals.   Patient prognosis is Good.     Patient will continue to benefit from skilled outpatient physical therapy to address the deficits listed in the problem list box on initial evaluation, provide pt/family education and to maximize pt's level of independence in the home and community environment.     Patient's spiritual, cultural and educational needs considered and pt agreeable to plan of care and goals.     Anticipated barriers to physical therapy: none    GOALS: (not met, progressing unless otherwise specified)   Short Term Goals:  4 weeks - remains in progress  1.Report decreased right sided back and leg pain  < / =  4/10 at worst to increase tolerance for " sleeping  2. Increase lumbar flexion range of motion to 0% limited in order to perform ADLs without difficulty.  3. Increase strength by 1/3 MMT grade in bilateral lower extremity to increase tolerance for ADL and work activities.   4. Pt to tolerate HEP to improve ROM and independence with ADL's MET     Long Term Goals: 8 weeks - remains in progress  1.Report decreased right sided back and leg pain < / = 2/10  to increase tolerance for ADLs  3.Increase strength to 4+/5 in  bilateral lower extremity to increase tolerance for ADL and work activities.  4. Pt will report at CJ level (20-40%) on FOTO  to demonstrate increase in LE function with every day tasks.      Plan     Continue to progress per PT POC    Brittni Guzman, PT

## 2024-03-28 ENCOUNTER — CLINICAL SUPPORT (OUTPATIENT)
Dept: REHABILITATION | Facility: HOSPITAL | Age: 79
End: 2024-03-28
Payer: MEDICARE

## 2024-03-28 DIAGNOSIS — Z74.09 IMPAIRED FUNCTIONAL MOBILITY, BALANCE, GAIT, AND ENDURANCE: Primary | ICD-10-CM

## 2024-03-28 DIAGNOSIS — S39.012A LUMBAR STRAIN, INITIAL ENCOUNTER: ICD-10-CM

## 2024-03-28 DIAGNOSIS — M47.816 SPONDYLOSIS OF LUMBAR REGION WITHOUT MYELOPATHY OR RADICULOPATHY: ICD-10-CM

## 2024-03-28 DIAGNOSIS — M51.36 DDD (DEGENERATIVE DISC DISEASE), LUMBAR: ICD-10-CM

## 2024-03-28 PROCEDURE — 97110 THERAPEUTIC EXERCISES: CPT | Mod: PO

## 2024-03-28 PROCEDURE — 97112 NEUROMUSCULAR REEDUCATION: CPT | Mod: PO

## 2024-03-28 NOTE — PRE-PROCEDURE INSTRUCTIONS
Patient reviewed on 3/28/2024.  Okay to proceed at [place]. The following pre-procedure instructions and arrival time have been reviewed with patient via phone and sent to patient portal for review.  Patient verbalized an understanding.  Pt to be accompanied by Maya day of procedure as responsible .     Dear Barbara ,     You are scheduled for a procedure with Dr. Santana on 4/1/2024.  Your scheduled arrival time is 10:00 am.  This arrival time is roughly 1 hour before your anticipated procedure time to allow sufficient time for pre-op..  Please wear comfortable clothes.  Most patients do not need to change into a gown.  Please do not wear a dress.  This procedure will take place at the [facility] at the corner of [facility] and [facility].  It is in the [place] [facility] next to [facility].  The address is:     [address] [place] [address].  [place] [id number]     After entering the building, you will proceed to the second floor where you can check in with registration. You should take any medications that you routinely take for blood pressure, heart medications, thyroid, cholesterol, etc.      The fasting restrictions are dependent on whether or not you are receiving sedation.  Sedation is not available for all procedures.      Your fasting instructions are as follow:  Oral Sedation. You do not need to fast before this procedure.  You can eat and drink like normal.  You CANNOT drive yourself and must have a .     If you are on blood thinners, you need to follow the anticoagulation instructions that had been discussed previously.  You should only stop the blood thinners if it was approved by your primary care physician or your cardiologist.  In the event that you are not able to stop your blood thinners, a blood thinner was not listed on your medication list, or we were not able to get clearance from your cardiologist, then the procedure may have to be  postponed/canceled.      IF you were told to stop your blood thinners, this is how long you should generally hold some of the more common ones.  Remember that stopping blood thinners is only necessary for certain procedures. If you are unsure of your instructions, please call us.   Aspirin - 5 days  Plavix/Clopidogrel - 7 days  Warfarin / Coumadin - 5 days  Eliquis - 3 days  Pradaxa/Dabigatran - 4 days  Xarelto/Rivaroxaban - 3 days     If you are a diabetic, do not take your medication if you will be fasting, but bring it with you. Please plan on being here for roughly 2 hours.     Please call us if you have been sick (running fever, having any flu-like symptoms) or have been taking antibiotics in the past 2 weeks or had any outpatient procedures other than with us (colonoscopy, endoscopy, OBGYN, dental, etc.). If you have been previously COVID positive, you will need to hold off on your procedure until you are symptom free for 10 days. If you did not have any symptoms, you can have your procedure 10 days from your positive test result.       *HOLD ALL VITAMINS, MINERALS, HERBS (INCLUDING HERBAL TEAS) AND SUPPLEMENTS  *SHOWER WITH ANTIBACTERIAL SOAP (EX. DIAL) NIGHT BEFORE AND MORNING OF PROCEDURE  *DO NOT APPLY ANY LOTIONS, OILS, POWDERS, PERFUME/COLOGNE, OINTMENTS, GELS, CREAMS, MAKEUP OR DEODORANT TO YOUR SKIN MORNING OF PROCEDURE  *LEAVE JEWELRY AND ANY VALUABLES AT HOME  *WEAR LOOSE COMFORTABLE CLOTHING (PREFERABLY A BUTTON UP SHIRT)     Please reply to this message as receipt of delivery.     Thank you,  [facility]  [place], LPN [facility]  Pre-Admit

## 2024-04-01 ENCOUNTER — HOSPITAL ENCOUNTER (OUTPATIENT)
Facility: HOSPITAL | Age: 79
Discharge: HOME | End: 2024-04-01
Attending: STUDENT IN AN ORGANIZED HEALTH CARE EDUCATION/TRAINING PROGRAM | Admitting: STUDENT IN AN ORGANIZED HEALTH CARE EDUCATION/TRAINING PROGRAM
Payer: MEDICARE

## 2024-04-01 VITALS
WEIGHT: 155 LBS | SYSTOLIC BLOOD PRESSURE: 109 MMHG | DIASTOLIC BLOOD PRESSURE: 55 MMHG | HEIGHT: 66 IN | RESPIRATION RATE: 16 BRPM | BODY MASS INDEX: 24.91 KG/M2 | TEMPERATURE: 98 F | OXYGEN SATURATION: 99 % | HEART RATE: 50 BPM

## 2024-04-01 DIAGNOSIS — G89.29 CHRONIC PAIN: ICD-10-CM

## 2024-04-01 DIAGNOSIS — M51.36 DDD (DEGENERATIVE DISC DISEASE), LUMBAR: Primary | ICD-10-CM

## 2024-04-01 DIAGNOSIS — M54.16 LUMBAR RADICULOPATHY: ICD-10-CM

## 2024-04-01 PROCEDURE — 62323 NJX INTERLAMINAR LMBR/SAC: CPT | Performed by: STUDENT IN AN ORGANIZED HEALTH CARE EDUCATION/TRAINING PROGRAM

## 2024-04-01 PROCEDURE — 62323 NJX INTERLAMINAR LMBR/SAC: CPT | Mod: ,,, | Performed by: STUDENT IN AN ORGANIZED HEALTH CARE EDUCATION/TRAINING PROGRAM

## 2024-04-01 PROCEDURE — 63600175 PHARM REV CODE 636 W HCPCS: Performed by: STUDENT IN AN ORGANIZED HEALTH CARE EDUCATION/TRAINING PROGRAM

## 2024-04-01 PROCEDURE — 25000003 PHARM REV CODE 250: Performed by: STUDENT IN AN ORGANIZED HEALTH CARE EDUCATION/TRAINING PROGRAM

## 2024-04-01 PROCEDURE — 25500020 PHARM REV CODE 255: Performed by: STUDENT IN AN ORGANIZED HEALTH CARE EDUCATION/TRAINING PROGRAM

## 2024-04-01 RX ORDER — LIDOCAINE HYDROCHLORIDE 10 MG/ML
INJECTION, SOLUTION EPIDURAL; INFILTRATION; INTRACAUDAL; PERINEURAL
Status: DISCONTINUED | OUTPATIENT
Start: 2024-04-01 | End: 2024-04-01 | Stop reason: HOSPADM

## 2024-04-01 RX ORDER — DEXAMETHASONE SODIUM PHOSPHATE 10 MG/ML
INJECTION INTRAMUSCULAR; INTRAVENOUS
Status: DISCONTINUED | OUTPATIENT
Start: 2024-04-01 | End: 2024-04-01 | Stop reason: HOSPADM

## 2024-04-01 RX ORDER — LIDOCAINE HYDROCHLORIDE 20 MG/ML
INJECTION, SOLUTION EPIDURAL; INFILTRATION; INTRACAUDAL; PERINEURAL
Status: DISCONTINUED | OUTPATIENT
Start: 2024-04-01 | End: 2024-04-01 | Stop reason: HOSPADM

## 2024-04-01 RX ORDER — ALPRAZOLAM 0.5 MG/1
0.5 TABLET, ORALLY DISINTEGRATING ORAL
Status: DISCONTINUED | OUTPATIENT
Start: 2024-04-01 | End: 2024-04-01 | Stop reason: HOSPADM

## 2024-04-01 RX ADMIN — ALPRAZOLAM 0.5 MG: 0.5 TABLET, ORALLY DISINTEGRATING ORAL at 10:04

## 2024-04-01 NOTE — PLAN OF CARE
Pt in preop bay 26, VSS and IV inserted. Pt denies any open wounds on body or the use of any weight loss injections.

## 2024-04-01 NOTE — OP NOTE
Lumbar Interlaminar Epidural Steroid Injection under Fluoroscopic Guidance    The procedure, risks, benefits, and options were discussed with the patient. There are no contraindications to the procedure. The patent expressed understanding and agreed to the procedure. Informed written consent was obtained prior to the start of the procedure and can be found in the patient's chart.    PATIENT NAME: Anette Willis   MRN: [id number]     DATE OF PROCEDURE: 04/01/2024    PROCEDURE: Lumbar Interlaminar Epidural Steroid Injection L5/S1 under Fluoroscopic Guidance    PRE-OP DIAGNOSIS: Lumbar radiculopathy [M54.16]  DDD (degenerative disc disease), lumbar [M51.36] Lumbar radiculopathy [M54.16]    POST-OP DIAGNOSIS: Same    PHYSICIAN: Naomi Santana DO    ASSISTANTS: None     MEDICATIONS INJECTED: Preservative-free Decadron 10mg with 4cc of Lidocaine 1% MPF and preservative free normal saline    LOCAL ANESTHETIC INJECTED: Xylocaine 2%     SEDATION: None    ESTIMATED BLOOD LOSS: None    COMPLICATIONS: None    TECHNIQUE: Time-out was performed to identify the patient and procedure to be performed. With the patient laying in a prone position, the surgical area was prepped and draped in the usual sterile fashion using ChloraPrep and a fenestrated drape. The level was determined under fluoroscopy guidance. Skin anesthesia was achieved by injecting Lidocaine 2% over the injection site. The interlaminar space was then approached with a 18 gauge,  3.5 inch Tuohy needle that was introduced under fluoroscopic guidance in the AP, lateral and/or contralateral oblique imaging. Once the Ligamentum flavum was encountered loss of resistance to saline was used to enter the epidural space. With positive loss of resistance and negative aspiration for CSF or Blood, contrast dye Omnipaque (300mg/mL) was injected to confirm placement and there was no vascular runoff. 5 mL of the medication mixture listed above was injected slowly. Displacement of  the radio opaque contrast after injection of the medication confirmed that the medication went into the area of the epidural space. The needles were removed and bleeding was nil. A sterile dressing was applied. No specimens collected. The patient tolerated the procedure well.       The patient was monitored after the procedure in the recovery area. They were given post-procedure and discharge instructions to follow at home. The patient was discharged in a stable condition.      Naomi Santana DO

## 2024-04-01 NOTE — DISCHARGE SUMMARY
Discharge Note  Short Stay      SUMMARY     Admit Date: 4/1/2024    Attending Physician: Naomi Santana      Discharge Physician: Naomi Santana      Discharge Date: 4/1/2024 10:53 AM    Procedure(s) (LRB):  WAN L5/S1 Interlaminar (N/A)    Final Diagnosis: Lumbar radiculopathy [M54.16]  DDD (degenerative disc disease), lumbar [M51.36]    Disposition: Home or self care    Patient Instructions:   Current Discharge Medication List        CONTINUE these medications which have NOT CHANGED    Details   apixaban (ELIQUIS) 5 mg Tab Take 1 tablet (5 mg total) by mouth 2 (two) times daily.  Qty: 180 tablet, Refills: 3    Associated Diagnoses: Paroxysmal atrial fibrillation      flecainide (TAMBOCOR) 100 MG Tab Take 1 tablet (100 mg total) by mouth every 12 (twelve) hours.  Qty: 60 tablet, Refills: 11    Associated Diagnoses: Paroxysmal atrial fibrillation      levothyroxine (SYNTHROID) 75 MCG tablet Take 1 tablet (75 mcg total) by mouth every morning.  Qty: 90 tablet, Refills: 1      losartan (COZAAR) 100 MG tablet Take 1 tablet (100 mg total) by mouth once daily.  Qty: 90 tablet, Refills: 1    Comments: .      metoprolol succinate (TOPROL-XL) 100 MG 24 hr tablet Take 1 tablet (100 mg total) by mouth 2 (two) times daily.  Qty: 60 tablet, Refills: 11    Comments: .  Associated Diagnoses: SVT (supraventricular tachycardia); Palpitations      rosuvastatin (CRESTOR) 10 MG tablet Take 1 tablet (10 mg total) by mouth once daily.  Qty: 90 tablet, Refills: 1      ketorolac 0.5% (ACULAR) 0.5 % Drop Place 1 drop into the right eye 3 (three) times daily.  Qty: 5 mL, Refills: 3    Associated Diagnoses: Nuclear sclerotic cataract of right eye      ofloxacin (OCUFLOX) 0.3 % ophthalmic solution Place 1 drop into the right eye 3 (three) times daily.  Qty: 5 mL, Refills: 3    Associated Diagnoses: Nuclear sclerotic cataract of right eye      prednisoLONE acetate (PRED FORTE) 1 % DrpS Place 1 drop into the right eye 3 (three) times  daily.  Qty: 5 mL, Refills: 3    Associated Diagnoses: Nuclear sclerotic cataract of right eye      traMADoL (ULTRAM) 50 mg tablet Take 1 tablet (50 mg total) by mouth every 6 (six) hours as needed for Pain.  Qty: 21 tablet, Refills: 0    Comments: Quantity prescribed more than 7 day supply? Yes, quantity medically necessary                 Discharge Diagnosis: Lumbar radiculopathy [M54.16]  DDD (degenerative disc disease), lumbar [M51.36]  Condition on Discharge: Stable with no complications to procedure   Diet on Discharge: Same as before.  Activity: as per instruction sheet.  Discharge to: Home with a responsible adult.  Follow up: 2-4 weeks       Please call my office or pager at [phone] if experienced any weakness or loss of sensation, fever > 101.5, pain uncontrolled with oral medications, persistent nausea/vomiting/or diarrhea, redness or drainage from the incisions, or any other worrisome concerns. If physician on call was not reached or could not communicate with our office for any reason please go to the nearest emergency department

## 2024-04-08 ENCOUNTER — OFFICE VISIT (OUTPATIENT)
Dept: OPHTHALMOLOGY | Facility: CLINIC | Age: 79
End: 2024-04-08
Payer: MEDICARE

## 2024-04-08 DIAGNOSIS — H25.11 NUCLEAR SCLEROTIC CATARACT OF RIGHT EYE: Primary | ICD-10-CM

## 2024-04-08 DIAGNOSIS — H35.371 EPIRETINAL MEMBRANE, RIGHT: ICD-10-CM

## 2024-04-08 DIAGNOSIS — H35.3132 NONEXUDATIVE AGE-RELATED MACULAR DEGENERATION, BILATERAL, INTERMEDIATE DRY STAGE: ICD-10-CM

## 2024-04-08 PROCEDURE — 99499 UNLISTED E&M SERVICE: CPT | Mod: S$PBB,,, | Performed by: OPHTHALMOLOGY

## 2024-04-08 NOTE — PROGRESS NOTES
HPI    Ref: Dr. Ugarte  - (Just moved from [facility], pt's son    to dr bety's daughter      DRy AMD OU  ERM OD  PVD Ou  NS OU    Sx: None   Gtt's:  None    Patient is here today for 2 weeks CL holiday.    Last edited by Alley Rogers MD on 4/8/2024 10:12 AM.            Assessment /Plan     For exam results, see Encounter Report.    Nuclear sclerotic cataract of right eye    Nonexudative age-related macular degeneration, bilateral, intermediate dry stage    Epiretinal membrane, right                 Visually significant nuclear sclerotic cataract   - Interfering with activities of daily living.  Pt desires cataract surgery for Va rehabilitation.   - R/B/A discussed and pt agrees to proceed with surgery.   - IOL options discussed according to patient's goals and concomitant ocular pathology; and pt content with monofocal lens.    - Target: plano.     OD first - may do intermediate OS depending on outcome of OD    The patient expresses a desire to reduce spectacle dependence. I reviewed various IOL and LASER refractive surgical options and we will attempt to minimize spectacle dependence by managing astigmatism and optimizing IOL selection. Femtosecond LASER assisted cataract surgery (FLACS) technology and Intraoperative aberrometer (ORA) was explained to the patient.  The patient voices understanding and wishes to implement this technology during the cataract procedure.  I explained the increased precision of the LASER versus manual techniques, especially as it relates to astigmatism reduction with arcuate incisions.  I emphasized that although our goal is to reduce the need for refractive correction after surgery, there may still be a need for spectacle correction to achieve optimal visual acuity, and that a reasonable range of functional vision should be the expectation.  No guarantees are made about post operative refraction or visual acuity, as the eye may heal in unpredictable ways,  and the standard risks, benefits, and alternatives to cataract surgery were explained.  The patient understands that the refractive portions of this cataract procedure are not covered by insurance, and that there is an out of pocket expense of $ 1400 per eye. I also explained that even though our pre-operative plan is to utilize advanced refractive technologies during surgery, that I may decide to eliminate part or all of this plan if surgical challenges or complications arise, or I feel that it is not in the patient's best interest. Consent forms were given to the patient to review.     CATALYS Parameters:    Right Eye:   CATRACHITO:  12mm              Need to myron patient sitting up: NO  Capsulotomy: Scanned Capsule  rdGrdrrdarddrderd:rd rd3rd Arcuate: 10' at 85  Incisions: OFF  Left Eye:              CATRACHITO:  12mm              Need to myron patient sitting up: NO  Capsulotomy: Scanned Capsule  rdGrdrrdarddrderd:rd rd3rd Arcuate: 10' at 73  Incisions:  OFF      Diboo 15.5 OD range  Ora    (Diboo 13.5 OS range)  Ora    Wears RGPs = needs CL holiday prior to calcs/ mumtaz OU - done.    High myopia.    Pt understands limited BCVA OD>OS 2/2 erm and amd    DRy AMD OD>OS  ERM OD  PVD Ou

## 2024-04-16 ENCOUNTER — HOSPITAL ENCOUNTER (OUTPATIENT)
Dept: CARDIOLOGY | Facility: CLINIC | Age: 79
Discharge: HOME | End: 2024-04-16
Payer: MEDICARE

## 2024-04-16 DIAGNOSIS — I47.10 SVT (SUPRAVENTRICULAR TACHYCARDIA): ICD-10-CM

## 2024-04-16 LAB
OHS QRS DURATION: 88 MS
OHS QTC CALCULATION: 412 MS

## 2024-04-16 PROCEDURE — 93010 ELECTROCARDIOGRAM REPORT: CPT | Mod: S$PBB,,, | Performed by: INTERNAL MEDICINE

## 2024-04-16 PROCEDURE — 93005 ELECTROCARDIOGRAM TRACING: CPT | Mod: PBBFAC | Performed by: INTERNAL MEDICINE

## 2024-04-17 DIAGNOSIS — I47.10 SVT (SUPRAVENTRICULAR TACHYCARDIA): Primary | ICD-10-CM

## 2024-04-18 ENCOUNTER — OFFICE VISIT (OUTPATIENT)
Dept: ELECTROPHYSIOLOGY | Facility: CLINIC | Age: 79
End: 2024-04-18
Payer: MEDICARE

## 2024-04-18 VITALS
SYSTOLIC BLOOD PRESSURE: 116 MMHG | WEIGHT: 160.06 LBS | HEART RATE: 51 BPM | BODY MASS INDEX: 25.72 KG/M2 | DIASTOLIC BLOOD PRESSURE: 60 MMHG | HEIGHT: 66 IN

## 2024-04-18 DIAGNOSIS — I47.10 SVT (SUPRAVENTRICULAR TACHYCARDIA): ICD-10-CM

## 2024-04-18 DIAGNOSIS — I10 HYPERTENSION, UNSPECIFIED TYPE: ICD-10-CM

## 2024-04-18 DIAGNOSIS — I48.0 PAROXYSMAL ATRIAL FIBRILLATION: Primary | ICD-10-CM

## 2024-04-18 DIAGNOSIS — R00.2 PALPITATIONS: ICD-10-CM

## 2024-04-18 PROCEDURE — 99999 PR PBB SHADOW E&M-EST. PATIENT-LVL III: CPT | Mod: PBBFAC,,, | Performed by: INTERNAL MEDICINE

## 2024-04-18 PROCEDURE — 99214 OFFICE O/P EST MOD 30 MIN: CPT | Mod: S$PBB,,, | Performed by: INTERNAL MEDICINE

## 2024-04-18 PROCEDURE — 99213 OFFICE O/P EST LOW 20 MIN: CPT | Mod: PBBFAC | Performed by: INTERNAL MEDICINE

## 2024-04-18 RX ORDER — METOPROLOL SUCCINATE 100 MG/1
100 TABLET, EXTENDED RELEASE ORAL 2 TIMES DAILY
Qty: 90 TABLET | Refills: 3 | Status: SHIPPED | OUTPATIENT
Start: 2024-04-18 | End: 2025-04-18

## 2024-04-18 NOTE — PROGRESS NOTES
"Subjective:   Patient ID:  Anette Willis is a 79 y.o. female who presents for evaluation of Atrial Fibrillation    Referring Cardiology Providers: Neil Dubois MD and Abby Richards MD  Primary Care Physician: Simon Hernandez MD    HPI  Prior Hx:  I had the pleasure of seeing Mrs. Willis today in our electrophysiology clinic in follow-up for her atrial fibrillation. As you are aware she is a pleasant 79 year-old woman with hypertension and SVT. She has a long history of palpitations, previously determined to be SVT. She was treated with metoprolol by her cardiologist when she lived in [place]. She saw Dr. Dubois to establish care after moving to [place]. A 30 day event monitor was ordered. She was observed to have an episode of AF with RVR for which she had been referred and seen in 2/2024. She was started on eliquis. No ECGs from [place] of SVT. Eliquis is very unaffordable. She paid $800 for a 90 day supply. Suspect "SVT" is a paroxysmal atrial tachycardia. Discussed drug therapy versus ablation. She preferred to start with an anti-arrhythmic drug. She has a structurally normal heart and no symptoms of ischemic heart disease. Will start flecainide 100mg bid. Requested she check with her drug insurance company if dabigatran is covered. If not recommend switching to warfarin at the end of her 90 days of eliquis    ECHO 2/2024: normal LVEF, normal LA size    Interim Hx:  Mrs. Willis returns for follow-up. Feels great. Concerned with her resting heart rate in the 50s on her meds. This is asymptomatic. She has not called her insurance yet about Dabigatran coverage.    My interpretation of today's in-clinic ECG is sinus rhythm with a narrow QRS and rate of 51 bpm.    Review of Systems   Constitutional: Negative for fever and malaise/fatigue.   HENT:  Negative for congestion and sore throat.    Eyes:  Negative for blurred vision and visual disturbance.   Cardiovascular:  Negative for chest pain, dyspnea on " exertion, irregular heartbeat, near-syncope, palpitations and syncope.   Respiratory:  Negative for cough and shortness of breath.    Hematologic/Lymphatic: Negative for bleeding problem. Does not bruise/bleed easily.   Skin: Negative.    Musculoskeletal:  Positive for arthritis and joint pain.   Gastrointestinal:  Negative for bloating, abdominal pain, hematochezia and melena.   Neurological:  Negative for focal weakness and weakness.   Psychiatric/Behavioral: Negative.         Objective:   Physical Exam  Vitals reviewed.   Constitutional:       General: She is not in acute distress.     Appearance: She is well-developed. She is not diaphoretic.   HENT:      Head: Normocephalic and atraumatic.   Eyes:      General:         Right eye: No discharge.         Left eye: No discharge.      Conjunctiva/sclera: Conjunctivae normal.   Cardiovascular:      Rate and Rhythm: Normal rate and regular rhythm.      Heart sounds: No murmur heard.     No friction rub. No gallop.   Pulmonary:      Effort: Pulmonary effort is normal. No respiratory distress.      Breath sounds: Normal breath sounds. No wheezing or rales.   Abdominal:      General: Bowel sounds are normal. There is no distension.      Palpations: Abdomen is soft.      Tenderness: There is no abdominal tenderness.   Musculoskeletal:      Cervical back: Neck supple.   Skin:     General: Skin is warm and dry.   Neurological:      Mental Status: She is alert and oriented to person, place, and time.   Psychiatric:         Behavior: Behavior normal.         Thought Content: Thought content normal.         Judgment: Judgment normal.         Assessment:      1. Paroxysmal atrial fibrillation    2. SVT (supraventricular tachycardia)    3. Hypertension, unspecified type        Plan:     In summary, Mrs. Willis is a pleasant 79 year-old woman with hypertension and reported SVT presenting for evaluation for newly diagnosed atrial fibrillation. I had a long discussion with the  "patient about the pathophysiology and risks of atrial fibrillation and its basic pathophysiology, including its health implications and treatment options. Specifically, I addressed the need for CVA (stroke) prophylaxis with aspirin versus oral anticoagulation (warfarin vs DOACs, discussed bleeding risks, and need to come to the ER for any head trauma for CT scanning even if asymptomatic). [id number] score is 4 and oral anticoagulation is indicated. I also discussed the goal to reduce symptomatic arrhythmic episodes by pharmacologic and/or procedural methods and utilizing a rhythm versus a rate control strategy. Suspect "SVT" is a paroxysmal atrial tachycardia. Discussed drug therapy versus ablation. She preferred to start AAD therapy. She is on flecainide/metoprolol. This is working well for her. Will lower metoprolol succinate to 100mg once daily. Recommended again to contact her insurance regarding coverage of Dabigatran. Should this be unaffordable would recommend transition to warfarin with goal INR of 2-3 once she runs out of eliquis.    RTC in 6 months    Thank you for allowing me to participate in the care of this patient. Please do not hesitate to call me with any questions or concerns.    Blade Post MD, PhD  Cardiac Electrophysiology                     "

## 2024-04-19 ENCOUNTER — TELEPHONE (OUTPATIENT)
Dept: ELECTROPHYSIOLOGY | Facility: CLINIC | Age: 79
End: 2024-04-19
Payer: MEDICARE

## 2024-04-19 NOTE — TELEPHONE ENCOUNTER
----- Message from Kizzy Chacon MA sent at 4/18/2024  4:08 PM CDT -----  Regarding: RE: Prescritption    ----- Message -----  From: Radha Stephen  Sent: 4/18/2024   2:44 PM CDT  To: [facility] Staff  Subject: Prescritption                                    Patient calling to get a prescription on dabigatran 150 mg twice a day per Ismael @ [facility]/pharmacy #[id number] - [place] [address]   Phone: [phone]  Fax: [phone]      Please call back @ [phone]. Thank you Radha

## 2024-04-22 ENCOUNTER — HOSPITAL ENCOUNTER (EMERGENCY)
Facility: HOSPITAL | Age: 79
Discharge: HOME | End: 2024-04-23
Attending: EMERGENCY MEDICINE
Payer: MEDICARE

## 2024-04-22 DIAGNOSIS — I48.0 PAROXYSMAL A-FIB: ICD-10-CM

## 2024-04-22 DIAGNOSIS — R42 DIZZINESS: Primary | ICD-10-CM

## 2024-04-22 DIAGNOSIS — R73.9 HYPERGLYCEMIA: ICD-10-CM

## 2024-04-22 LAB
ALBUMIN SERPL BCP-MCNC: 3.6 G/DL (ref 3.5–5.2)
ALP SERPL-CCNC: 84 U/L (ref 55–135)
ALT SERPL W/O P-5'-P-CCNC: 26 U/L (ref 10–44)
ANION GAP SERPL CALC-SCNC: 11 MMOL/L (ref 8–16)
AST SERPL-CCNC: 28 U/L (ref 10–40)
BASOPHILS # BLD AUTO: 0.05 K/UL (ref 0–0.2)
BASOPHILS NFR BLD: 0.4 % (ref 0–1.9)
BILIRUB SERPL-MCNC: 0.3 MG/DL (ref 0.1–1)
BNP SERPL-MCNC: 601 PG/ML (ref 0–99)
BUN SERPL-MCNC: 16 MG/DL (ref 8–23)
BUN SERPL-MCNC: 17 MG/DL (ref 6–30)
CALCIUM SERPL-MCNC: 9.2 MG/DL (ref 8.7–10.5)
CHLORIDE SERPL-SCNC: 102 MMOL/L (ref 95–110)
CHLORIDE SERPL-SCNC: 98 MMOL/L (ref 95–110)
CO2 SERPL-SCNC: 21 MMOL/L (ref 23–29)
CREAT SERPL-MCNC: 1.1 MG/DL (ref 0.5–1.4)
CREAT SERPL-MCNC: 1.1 MG/DL (ref 0.5–1.4)
DIFFERENTIAL METHOD BLD: ABNORMAL
EOSINOPHIL # BLD AUTO: 0.1 K/UL (ref 0–0.5)
EOSINOPHIL NFR BLD: 0.9 % (ref 0–8)
ERYTHROCYTE [DISTWIDTH] IN BLOOD BY AUTOMATED COUNT: 13.5 % (ref 11.5–14.5)
EST. GFR  (NO RACE VARIABLE): 51.8 ML/MIN/1.73 M^2
GLUCOSE SERPL-MCNC: 152 MG/DL (ref 70–110)
GLUCOSE SERPL-MCNC: 153 MG/DL (ref 70–110)
HCT VFR BLD AUTO: 40.9 % (ref 37–48.5)
HCT VFR BLD CALC: 41 %PCV (ref 36–54)
HCV AB SERPL QL IA: NORMAL
HGB BLD-MCNC: 13.8 G/DL (ref 12–16)
HIV 1+2 AB+HIV1 P24 AG SERPL QL IA: NORMAL
IMM GRANULOCYTES # BLD AUTO: 0.05 K/UL (ref 0–0.04)
IMM GRANULOCYTES NFR BLD AUTO: 0.4 % (ref 0–0.5)
LYMPHOCYTES # BLD AUTO: 1.6 K/UL (ref 1–4.8)
LYMPHOCYTES NFR BLD: 12.4 % (ref 18–48)
MCH RBC QN AUTO: 27.9 PG (ref 27–31)
MCHC RBC AUTO-ENTMCNC: 33.7 G/DL (ref 32–36)
MCV RBC AUTO: 83 FL (ref 82–98)
MONOCYTES # BLD AUTO: 0.7 K/UL (ref 0.3–1)
MONOCYTES NFR BLD: 5.4 % (ref 4–15)
NEUTROPHILS # BLD AUTO: 10.2 K/UL (ref 1.8–7.7)
NEUTROPHILS NFR BLD: 80.5 % (ref 38–73)
NRBC BLD-RTO: 0 /100 WBC
PLATELET # BLD AUTO: 220 K/UL (ref 150–450)
PMV BLD AUTO: 11.6 FL (ref 9.2–12.9)
POC CARDIAC TROPONIN I: 0 NG/ML (ref 0–0.08)
POC IONIZED CALCIUM: 1.18 MMOL/L (ref 1.06–1.42)
POC TCO2 (MEASURED): 25 MMOL/L (ref 23–29)
POTASSIUM BLD-SCNC: 4.4 MMOL/L (ref 3.5–5.1)
POTASSIUM SERPL-SCNC: 4.5 MMOL/L (ref 3.5–5.1)
PROT SERPL-MCNC: 6.5 G/DL (ref 6–8.4)
RBC # BLD AUTO: 4.95 M/UL (ref 4–5.4)
SAMPLE: ABNORMAL
SAMPLE: NORMAL
SODIUM BLD-SCNC: 133 MMOL/L (ref 136–145)
SODIUM SERPL-SCNC: 134 MMOL/L (ref 136–145)
TROPONIN I SERPL DL<=0.01 NG/ML-MCNC: <0.006 NG/ML (ref 0–0.03)
WBC # BLD AUTO: 12.71 K/UL (ref 3.9–12.7)

## 2024-04-22 PROCEDURE — 85025 COMPLETE CBC W/AUTO DIFF WBC: CPT

## 2024-04-22 PROCEDURE — 83880 ASSAY OF NATRIURETIC PEPTIDE: CPT

## 2024-04-22 PROCEDURE — 93010 ELECTROCARDIOGRAM REPORT: CPT | Mod: ,,, | Performed by: INTERNAL MEDICINE

## 2024-04-22 PROCEDURE — 84484 ASSAY OF TROPONIN QUANT: CPT

## 2024-04-22 PROCEDURE — 94761 N-INVAS EAR/PLS OXIMETRY MLT: CPT

## 2024-04-22 PROCEDURE — 99285 EMERGENCY DEPT VISIT HI MDM: CPT | Mod: 25

## 2024-04-22 PROCEDURE — 93005 ELECTROCARDIOGRAM TRACING: CPT

## 2024-04-22 PROCEDURE — 80048 BASIC METABOLIC PNL TOTAL CA: CPT | Mod: XB

## 2024-04-22 PROCEDURE — 80053 COMPREHEN METABOLIC PANEL: CPT

## 2024-04-22 PROCEDURE — 87389 HIV-1 AG W/HIV-1&-2 AB AG IA: CPT | Performed by: PHYSICIAN ASSISTANT

## 2024-04-22 PROCEDURE — 86803 HEPATITIS C AB TEST: CPT | Performed by: PHYSICIAN ASSISTANT

## 2024-04-23 ENCOUNTER — TELEPHONE (OUTPATIENT)
Dept: PRIMARY CARE CLINIC | Facility: CLINIC | Age: 79
End: 2024-04-23
Payer: MEDICARE

## 2024-04-23 ENCOUNTER — PATIENT MESSAGE (OUTPATIENT)
Dept: ELECTROPHYSIOLOGY | Facility: CLINIC | Age: 79
End: 2024-04-23
Payer: MEDICARE

## 2024-04-23 ENCOUNTER — PATIENT MESSAGE (OUTPATIENT)
Dept: CARDIOLOGY | Facility: CLINIC | Age: 79
End: 2024-04-23
Payer: MEDICARE

## 2024-04-23 VITALS
RESPIRATION RATE: 20 BRPM | WEIGHT: 160.06 LBS | OXYGEN SATURATION: 96 % | HEIGHT: 66 IN | TEMPERATURE: 98 F | SYSTOLIC BLOOD PRESSURE: 174 MMHG | DIASTOLIC BLOOD PRESSURE: 74 MMHG | HEART RATE: 58 BPM | BODY MASS INDEX: 25.72 KG/M2

## 2024-04-23 LAB
OHS QRS DURATION: 88 MS
OHS QTC CALCULATION: 441 MS
POC CARDIAC TROPONIN I: 0 NG/ML (ref 0–0.08)
SAMPLE: NORMAL
TROPONIN I SERPL DL<=0.01 NG/ML-MCNC: <0.006 NG/ML (ref 0–0.03)

## 2024-04-23 PROCEDURE — 84484 ASSAY OF TROPONIN QUANT: CPT

## 2024-04-23 RX ORDER — DABIGATRAN ETEXILATE 150 MG/1
150 CAPSULE ORAL 2 TIMES DAILY
Qty: 60 CAPSULE | Refills: 11 | Status: SHIPPED | OUTPATIENT
Start: 2024-04-23

## 2024-04-23 NOTE — DISCHARGE INSTRUCTIONS

## 2024-04-23 NOTE — ED PROVIDER NOTES
Encounter Date: 4/22/2024       History     Chief Complaint   Patient presents with    Dizziness     Arrived with EMS from home with c/o dizziness when standing up, denies LOC     79-year-old female with PMH of paroxysmal afib, HTN, SVT, cataract of right eye presents to ED on concerns of dizziness. On encounter, she states she was walking around 7pm, felt a spell of dizziness and lightheadedness that coincided with tremors, and sat down. On encounter at ED, she does not have any symptoms. She states she is very compliant with her medications. She had an ECG taken last week when visiting her EP, which she states had resulted in normal findings. She is compliant with metoprolol 100mg bid for SVT. She takes eliquis, flecainide for afib. She otherwise denies symptoms of fevers, chills, nausea, chest pain. She denies prior episodes of dizziness, lightheadedness.       The history is provided by the patient.     Review of patient's allergies indicates:  No Known Allergies  Past Medical History:   Diagnosis Date    Hypertension     Mixed hyperlipidemia      Past Surgical History:   Procedure Laterality Date    EPIDURAL STEROID INJECTION INTO LUMBAR SPINE N/A 4/1/2024    Procedure: WAN L5/S1 Interlaminar;  Surgeon: Naomi Santana DO;  Location: [facility];  Service: Pain Management;  Laterality: N/A;  Oral Sed-20mins Eliquis 3d-ASA 5d     No family history on file.  Social History     Tobacco Use    Smoking status: Never     Passive exposure: Never    Smokeless tobacco: Never     Review of Systems   Constitutional:  Negative for activity change, chills, fatigue and fever.   Respiratory:  Negative for cough and shortness of breath.    Cardiovascular:  Negative for chest pain and leg swelling.   Gastrointestinal:  Negative for abdominal distention, diarrhea, nausea and vomiting.   Musculoskeletal:  Negative for gait problem.   Skin:  Negative for wound.   Neurological:  Positive for dizziness, tremors and  light-headedness. Negative for weakness and headaches.       Physical Exam     Initial Vitals [04/22/24 2053]   BP Pulse Resp Temp SpO2   117/62 60 18 98.2 °F (36.8 °C) 100 %      MAP       --         Physical Exam    Vitals reviewed.  Constitutional: She appears well-developed and well-nourished.   HENT:   Head: Normocephalic and atraumatic.   Eyes: EOM are normal. Pupils are equal, round, and reactive to light.   Neck: Neck supple.   Normal range of motion.  Cardiovascular:  Normal rate and regular rhythm.           No murmur heard.  Pulmonary/Chest: Breath sounds normal. No respiratory distress. She has no wheezes. She has no rhonchi. She has no rales. She exhibits no tenderness.   Abdominal: Abdomen is soft. She exhibits no distension.   Musculoskeletal:      Cervical back: Normal range of motion and neck supple.     Neurological: She is alert and oriented to person, place, and time. No cranial nerve deficit. GCS score is 15. GCS eye subscore is 4. GCS verbal subscore is 5. GCS motor subscore is 6.         ED Course   Procedures  Labs Reviewed   CBC W/ AUTO DIFFERENTIAL - Abnormal; Notable for the following components:       Result Value    WBC 12.71 (*)     Gran # (ANC) 10.2 (*)     Immature Grans (Abs) 0.05 (*)     Gran % 80.5 (*)     Lymph % 12.4 (*)     All other components within normal limits   COMPREHENSIVE METABOLIC PANEL - Abnormal; Notable for the following components:    Sodium 134 (*)     CO2 21 (*)     Glucose 153 (*)     eGFR 51.8 (*)     All other components within normal limits   B-TYPE NATRIURETIC PEPTIDE - Abnormal; Notable for the following components:     (*)     All other components within normal limits   ISTAT PROCEDURE - Abnormal; Notable for the following components:    POC Glucose 152 (*)     POC Sodium 133 (*)     All other components within normal limits   HIV 1 / 2 ANTIBODY    Narrative:     Release to patient->Immediate   HEPATITIS C ANTIBODY    Narrative:     Release to  patient->Immediate   TROPONIN I   TROPONIN I   TROPONIN ISTAT   TROPONIN ISTAT   POCT TROPONIN   ISTAT CHEM8   POCT TROPONIN     EKG Readings: (Independently Interpreted)   EKG with regular rate, regular rhythm, normal axis, no acute ST elevations or depressions, normal NM, QRS and QT interval. Interpreted by me.         Imaging Results              X-Ray Chest AP Portable (Final result)  Result time 04/22/24 23:21:25      Final result by Leoncio Ryan DO (04/22/24 23:21:25)                   Impression:      No acute abnormality.      Electronically signed by: Leoncio Ryan  Date:    04/22/2024  Time:    23:21               Narrative:    EXAMINATION:  XR CHEST AP PORTABLE    CLINICAL HISTORY:  Chest Pain;    TECHNIQUE:  Single frontal view of the chest was performed.    COMPARISON:  None    FINDINGS:  The lungs are well expanded and clear.  There is nonspecific elevation of the left hemidiaphragm.  No focal opacities are seen. The pleural spaces are clear. The cardiac silhouette is unremarkable.  There are calcifications of the aortic arch.  The visualized osseous structures demonstrate degenerative changes.                                    X-Rays:   Independently Interpreted Readings:   Other Readings:  I reviewed the CXR independently of the radiologist.     Chest x-ray was negative for acute cardiopulmonary abnormalities, infiltrates or bony abnormalities.        Medications - No data to display  Medical Decision Making  79-year-old female with PMH of paroxysmal afib, HTN, SVT, cataract of right eye presents to ED on concerns of dizziness. My clinical impression is presyncope with differential diagnoses of dehydration, atrial flutter. CXR taken on admission showing no acute abnormalities. CBC/CMP showing Na 134, diminished bicarb 21, WBC 12.71. , trop series negative. ECG showing sinus bradycardia but otherwise normal ECG. She will be admitted to obs on concerns of tachycardia and  presyncope.    Amount and/or Complexity of Data Reviewed  Labs: ordered.  Radiology: ordered.              Attending Attestation:   Physician Attestation Statement for Resident:  As the supervising MD   I agree with the above history.  -:   As the supervising MD I agree with the above PE.     As the supervising MD I agree with the above treatment, course, plan, and disposition.   -: 79-year-old female with history of hypothyroidism, AFib on beta-blocker and Eliquis presents now for near syncopal episode, occurring twice at home.  Symptoms have since resolved.  Her apple watch showed heart rate in the low 110s.  On exam here, patient is alert oriented interactive, lungs are clear to auscultation, heart sounds are normal.  EKG shows sinus rhythm, regular rate, no acute ST elevations or depressions.  Patient overall well-appearing.  I offered her admission for telemetry, consult electrophysiology, the patient states she would like to go home.  She was able to ambulate without difficulty.  I advised her that she may have some underlying arrhythmia causing these near syncopal episodes, she expressed understanding of that and would like to follow up with her electrophysiologis.  I told her she could follow-up, return if symptoms worsen.  She and her  are comfortable with the plan.                                          Clinical Impression:  Final diagnoses:  [R42] Dizziness (Primary)  [I48.0] Paroxysmal A-fib  [R73.9] Hyperglycemia          ED Disposition Condition    Discharge Stable          ED Prescriptions    None       Follow-up Information       Follow up With Specialties Details Why Contact Info    Blade Post MD Electrophysiology, Cardiology Call in 1 day To set up a follow-up appointment, To recheck today's symptoms [facility] [facility] [address]  [phone]      Simon Hernandez MD Internal Medicine Call in 1 day To recheck today's symptoms [address] [place] [address]  [address]  [place]  [address]  [phone]               Chadd Cho MD  Resident  04/23/24 0326       Winston Aldrich MD  04/23/24 3036

## 2024-04-23 NOTE — ED TRIAGE NOTES
Anette Lazaro, a 79 y.o. female presents to the ED w/ complaint of dizziness while in the toilet and while walking around the house, pt denies any LOC during dizzy episodes.     Triage note:  Chief Complaint   Patient presents with    Dizziness     Arrived with EMS from home with c/o dizziness when standing up, denies LOC     Review of patient's allergies indicates:  No Known Allergies  Past Medical History:   Diagnosis Date    Hypertension     Mixed hyperlipidemia

## 2024-04-23 NOTE — TELEPHONE ENCOUNTER
Spoke with patient and scheduled hospital follow up with pcp. Pt expressed understanding and all her questions were answered.

## 2024-04-23 NOTE — TELEPHONE ENCOUNTER
----- Message from Phylicia Langley sent at 4/23/2024 10:48 AM CDT -----  Contact: [phone]/Marly/daughter in law  Caller is requesting an earlier appointment then we can schedule.  Caller is requesting a message be sent to the provider.    When is the next available appointment with their provider:  June 14, 2024  Reason for the appointment:  F/U FROM ER/dizziness, hyper glycemia/ afib  Patient preference of timeframe to be scheduled:    Would the patient like a call back, or a response through their [facility] portal?:   call  Comments:  Patient would like to be seen today

## 2024-04-24 ENCOUNTER — PATIENT OUTREACH (OUTPATIENT)
Dept: EMERGENCY MEDICINE | Facility: HOSPITAL | Age: 79
End: 2024-04-24
Payer: MEDICARE

## 2024-04-24 NOTE — PROGRESS NOTES
ED Navigator f/u with Pt from her recent ED visit for dizziness. She is scheduled to f/u with her PCP on 4-29-24 and states she is aware. ED Navigator will continue to assist as needed.

## 2024-04-28 NOTE — PROGRESS NOTES
[facility] Progress Note  4/29/2024          Reason for Visit:      had concerns including Hospital Follow Up.       History of Present Illness:     Pt is here today for ER marshall bucio    79-year-old female with PMH of paroxysmal afib, HTN, SVT, cataract of right eye who presented to ED with concerns of dizziness on 4/22/24.    She stated she was walking around 7pm, felt a spell of dizziness and lightheadedness that coincided with tremors, and sat down. In ED, she did not have any symptoms. S She had an ECG taken the previous week with her EP doctor that showed sinus bradycardia.  She is compliant with metoprolol 100mg bid for SVT. She takes eliquis, flecainide for afib. She otherwise denies symptoms of fevers, chills, nausea, chest pain. She denies prior episodes of dizziness, lightheadedness.     CXR taken in ER showed no acute abnormalities. CBC/CMP showing Na 134, low bicarb 21, WBC 12.71. , trop series negative. ECG showing sinus bradycardia but otherwise normal ECG.     She says that since this episode, she has had no recurrence.  She says she had had milder but similar episodes of dizziness prior to this intermittently but not frequently, that seemed correlated with times when she hadn't drank as much fluids.    She has been making an effort to stay well hydrated and hasn't had any recurrence.    Problem List:     Patient Active Problem List   Diagnosis    Hypothyroidism    HTN (hypertension)    Dyslipidemia    SVT (supraventricular tachycardia)    Impaired functional mobility, balance, gait, and endurance    DDD (degenerative disc disease), lumbar    Spondylosis of lumbar region without myelopathy or radiculopathy    Lumbar strain, initial encounter    Senile purpura    Paroxysmal atrial fibrillation    Nonexudative age-related macular degeneration, bilateral, intermediate dry stage    Posterior vitreous detachment, bilateral    Epiretinal membrane, right    Vitelliform lesion of  "macula    NS (nuclear sclerosis), bilateral    Nuclear sclerotic cataract of right eye    Aortic atherosclerosis         Medications:       Current Outpatient Medications:     apixaban (ELIQUIS) 5 mg Tab, Take 5 mg by mouth 2 (two) times daily., Disp: , Rfl:     dabigatran etexilate (PRADAXA) 150 mg Cap, Take 1 capsule (150 mg total) by mouth 2 (two) times a day. "Do NOT break, chew, or open capsules.", Disp: 60 capsule, Rfl: 11    flecainide (TAMBOCOR) 100 MG Tab, Take 1 tablet (100 mg total) by mouth every 12 (twelve) hours., Disp: 60 tablet, Rfl: 11    ketorolac 0.5% (ACULAR) 0.5 % Drop, Place 1 drop into the right eye 3 (three) times daily., Disp: 5 mL, Rfl: 3    levothyroxine (SYNTHROID) 75 MCG tablet, Take 1 tablet (75 mcg total) by mouth every morning., Disp: 90 tablet, Rfl: 1    losartan (COZAAR) 100 MG tablet, Take 1 tablet (100 mg total) by mouth once daily., Disp: 90 tablet, Rfl: 1    metoprolol succinate (TOPROL-XL) 100 MG 24 hr tablet, Take 1 tablet (100 mg total) by mouth 2 (two) times daily., Disp: 90 tablet, Rfl: 3    ofloxacin (OCUFLOX) 0.3 % ophthalmic solution, Place 1 drop into the right eye 3 (three) times daily., Disp: 5 mL, Rfl: 3    prednisoLONE acetate (PRED FORTE) 1 % DrpS, Place 1 drop into the right eye 3 (three) times daily., Disp: 5 mL, Rfl: 3    rosuvastatin (CRESTOR) 10 MG tablet, Take 1 tablet (10 mg total) by mouth once daily., Disp: 90 tablet, Rfl: 1    traMADoL (ULTRAM) 50 mg tablet, Take 1 tablet (50 mg total) by mouth every 6 (six) hours as needed for Pain. (Patient not taking: Reported on 4/29/2024), Disp: 21 tablet, Rfl: 0        Review of Systems:     Review of Systems   Constitutional:  Negative for chills and fever.   HENT:  Negative for ear pain and sore throat.    Respiratory:  Negative for cough, shortness of breath and wheezing.    Cardiovascular:  Negative for chest pain and palpitations.   Gastrointestinal:  Negative for constipation, diarrhea, nausea and vomiting. " "  Genitourinary:  Negative for dysuria and hematuria.   Musculoskeletal:  Negative for joint swelling and joint deformity.   Neurological:  Negative for dizziness and weakness.           Physical Exam:     Temp:             Blood Pressure:  132/70        Pulse:   60     Respirations:  16  Weight:   70.8 kg (156 lb 1.4 oz)  Height:   5' 6" (1.676 m)  BMI:     Body mass index is 25.19 kg/m².    Physical Exam  Constitutional:       General: She is not in acute distress.  Cardiovascular:      Rate and Rhythm: Normal rate and regular rhythm.   Pulmonary:      Effort: Pulmonary effort is normal. No respiratory distress.      Breath sounds: No wheezing.   Skin:     General: Skin is warm.   Neurological:      General: No focal deficit present.      Mental Status: She is alert and oriented to person, place, and time.           Labs/Imaging/Testing:     Most recent CBC:  Lab Results   Component Value Date    WBC 12.71 (H) 04/22/2024    HGB 13.8 04/22/2024     04/22/2024    MCV 83 04/22/2024       Most recent Lipids:  Lab Results   Component Value Date    CHOL 133 03/05/2024    HDL 36 (L) 03/05/2024    LDLCALC 73.8 03/05/2024    TRIG 116 03/05/2024       Most recent Chemistry:  Lab Results   Component Value Date     (L) 04/22/2024    K 4.5 04/22/2024     04/22/2024    CO2 21 (L) 04/22/2024    BUN 16 04/22/2024    CREATININE 1.1 04/22/2024     (H) 04/22/2024    CALCIUM 9.2 04/22/2024    ALT 26 04/22/2024    AST 28 04/22/2024    ALKPHOS 84 04/22/2024    PROT 6.5 04/22/2024    ALBUMIN 3.6 04/22/2024       Other tests:  Lab Results   Component Value Date    TSH 1.321 03/05/2024    FREET4 1.46 03/05/2024    HGBA1C 6.2 (H) 03/05/2024    ORTWPASU49GB 45 03/05/2024       Assessment and Plan:     1. Near syncope  Pt will continue to try to stay well-hydrated.  If recurrent episodes occur, we discussed that consultation with EP might be helpful as the relative bradycardia could be contributing.    2. Paroxysmal " atrial fibrillation  - Ambulatory referral/consult to Cardiology; Future    3. Aortic atherosclerosis  Continue rosuvastatin    RTC 3 mos or sooner honorio Hernandez MD  4/29/2024    This note was prepared using voice-recognition software.  Although efforts are made to proofread the note, some errors may persist in the final document.

## 2024-04-29 ENCOUNTER — OFFICE VISIT (OUTPATIENT)
Dept: PRIMARY CARE CLINIC | Facility: CLINIC | Age: 79
End: 2024-04-29
Payer: MEDICARE

## 2024-04-29 VITALS
HEART RATE: 60 BPM | WEIGHT: 156.06 LBS | DIASTOLIC BLOOD PRESSURE: 70 MMHG | RESPIRATION RATE: 16 BRPM | SYSTOLIC BLOOD PRESSURE: 132 MMHG | BODY MASS INDEX: 25.08 KG/M2 | HEIGHT: 66 IN | OXYGEN SATURATION: 95 %

## 2024-04-29 DIAGNOSIS — R55 NEAR SYNCOPE: Primary | ICD-10-CM

## 2024-04-29 DIAGNOSIS — I70.0 AORTIC ATHEROSCLEROSIS: ICD-10-CM

## 2024-04-29 DIAGNOSIS — I48.0 PAROXYSMAL ATRIAL FIBRILLATION: ICD-10-CM

## 2024-04-29 PROCEDURE — 99214 OFFICE O/P EST MOD 30 MIN: CPT | Mod: S$PBB,,, | Performed by: INTERNAL MEDICINE

## 2024-04-29 PROCEDURE — 99999 PR PBB SHADOW E&M-EST. PATIENT-LVL V: CPT | Mod: PBBFAC,,, | Performed by: INTERNAL MEDICINE

## 2024-04-29 PROCEDURE — 99215 OFFICE O/P EST HI 40 MIN: CPT | Mod: PBBFAC,PN | Performed by: INTERNAL MEDICINE

## 2024-05-03 NOTE — H&P
"History    Chief complaint:  Painless progressive vision loss    Present Ilness/Diagnosis: Nuclear sclerotic Cataract    Past Medical History:  has a past medical history of Hypertension and Mixed hyperlipidemia.    Family History/Social History: refer to chart    Allergies: Review of patient's allergies indicates:  No Known Allergies    Current Medications: No current facility-administered medications for this encounter.    Current Outpatient Medications:     apixaban (ELIQUIS) 5 mg Tab, Take 5 mg by mouth 2 (two) times daily., Disp: , Rfl:     dabigatran etexilate (PRADAXA) 150 mg Cap, Take 1 capsule (150 mg total) by mouth 2 (two) times a day. "Do NOT break, chew, or open capsules.", Disp: 60 capsule, Rfl: 11    flecainide (TAMBOCOR) 100 MG Tab, Take 1 tablet (100 mg total) by mouth every 12 (twelve) hours., Disp: 60 tablet, Rfl: 11    ketorolac 0.5% (ACULAR) 0.5 % Drop, Place 1 drop into the right eye 3 (three) times daily., Disp: 5 mL, Rfl: 3    levothyroxine (SYNTHROID) 75 MCG tablet, Take 1 tablet (75 mcg total) by mouth every morning., Disp: 90 tablet, Rfl: 1    losartan (COZAAR) 100 MG tablet, Take 1 tablet (100 mg total) by mouth once daily., Disp: 90 tablet, Rfl: 1    metoprolol succinate (TOPROL-XL) 100 MG 24 hr tablet, Take 1 tablet (100 mg total) by mouth 2 (two) times daily., Disp: 90 tablet, Rfl: 3    ofloxacin (OCUFLOX) 0.3 % ophthalmic solution, Place 1 drop into the right eye 3 (three) times daily., Disp: 5 mL, Rfl: 3    prednisoLONE acetate (PRED FORTE) 1 % DrpS, Place 1 drop into the right eye 3 (three) times daily., Disp: 5 mL, Rfl: 3    rosuvastatin (CRESTOR) 10 MG tablet, Take 1 tablet (10 mg total) by mouth once daily., Disp: 90 tablet, Rfl: 1    traMADoL (ULTRAM) 50 mg tablet, Take 1 tablet (50 mg total) by mouth every 6 (six) hours as needed for Pain. (Patient not taking: Reported on 4/29/2024), Disp: 21 tablet, Rfl: 0    ASA Score: II     Mallampati Score: II     Plan for Sedation: " Moderate Sedation     Patient or Family History of Anesthesia problems : No    Physical Exam    BP: Vital signs stable  General: No apparent distress  HEENT: nuclear sclerotic cataract  Lungs: adequate respirations  Heart: + pulses  Abdomen: soft  Rectal/pelvic: deferred    Impression: Visually significant Cataract.    See previous clinic notes for surgical indications.    Plan: Phacoemulsification with implantation of Intraocular lens

## 2024-05-06 ENCOUNTER — PATIENT OUTREACH (OUTPATIENT)
Dept: EMERGENCY MEDICINE | Facility: HOSPITAL | Age: 79
End: 2024-05-06
Payer: MEDICARE

## 2024-05-06 ENCOUNTER — TELEPHONE (OUTPATIENT)
Dept: OPHTHALMOLOGY | Facility: CLINIC | Age: 79
End: 2024-05-06
Payer: MEDICARE

## 2024-05-06 NOTE — PROGRESS NOTES
Call placed to remind Pt of appt with, [facility], Pain Medicine at 10:40 on 5-7-24. Pt verbalized understanding.

## 2024-05-06 NOTE — TELEPHONE ENCOUNTER
Spoke with pt provided arrival time of 9:00 for sx on 5/8/24 with Dr. Rogers @ [facility]. Pt has eyedrops and packet.

## 2024-05-07 ENCOUNTER — OFFICE VISIT (OUTPATIENT)
Dept: PAIN MEDICINE | Facility: CLINIC | Age: 79
End: 2024-05-07
Payer: MEDICARE

## 2024-05-07 VITALS
WEIGHT: 157.31 LBS | BODY MASS INDEX: 25.28 KG/M2 | SYSTOLIC BLOOD PRESSURE: 105 MMHG | HEIGHT: 66 IN | HEART RATE: 108 BPM | DIASTOLIC BLOOD PRESSURE: 71 MMHG

## 2024-05-07 DIAGNOSIS — G89.4 CHRONIC PAIN SYNDROME: ICD-10-CM

## 2024-05-07 DIAGNOSIS — M51.36 DDD (DEGENERATIVE DISC DISEASE), LUMBAR: Primary | ICD-10-CM

## 2024-05-07 DIAGNOSIS — M54.16 LUMBAR RADICULOPATHY: ICD-10-CM

## 2024-05-07 PROCEDURE — 99999 PR PBB SHADOW E&M-EST. PATIENT-LVL IV: CPT | Mod: PBBFAC,,, | Performed by: STUDENT IN AN ORGANIZED HEALTH CARE EDUCATION/TRAINING PROGRAM

## 2024-05-07 PROCEDURE — 99213 OFFICE O/P EST LOW 20 MIN: CPT | Mod: S$PBB,,, | Performed by: STUDENT IN AN ORGANIZED HEALTH CARE EDUCATION/TRAINING PROGRAM

## 2024-05-07 PROCEDURE — 99214 OFFICE O/P EST MOD 30 MIN: CPT | Mod: PBBFAC,PO | Performed by: STUDENT IN AN ORGANIZED HEALTH CARE EDUCATION/TRAINING PROGRAM

## 2024-05-07 NOTE — PRE-PROCEDURE INSTRUCTIONS
Patient reviewed on 5/7/2024.  Okay to proceed at [facility]. The following pre-procedure instructions and arrival time have been reviewed with patient via phone and sent to patient portal for review.  Patient verbalized an understanding.  Pt to be accompanied by her son day of procedure as responsible .    Dear Barbara     Below you will find basic pre-procedure instructions in preparation for your procedure on 5/8/24 with Dr. Rogers     You should have received your arrival time already from Dr's office.     - Nothing to eat or drink after midnight the night before your procedure until after your procedure, except AM meds with small sips of water.     - HOLD all oral Diabetic medications night before and morning of procedure  - HOLD all Insulin morning of procedure  - HOLD all Fluid pills morning of procedure  - HOLD all non-insulin shots until after surgery (Ozempic, Mounjaro, Trulicity, Victoza, Byetta, Wegovy and Adlyxin) (7 days prior)  - HOLD all vitamins, minerals and herbal supplements morning of procedure   - TAKE all B/P meds, EXCEPT those that contain a fluid pill (ex. Lasix, Hydroclorothiazide/HCTZ, Spirnolactone)  - USE inhalers as needed and bring AM of surgery  - USE EYE DROPS as directed  -TAKE blood thinner meds AM of surgery unless otherwise instructed by your provider to not take     - Shower and wash face with dial soap for 3 mins PM prior and AM of surgery  - No powder, lotions, creams, oils, gels, ointments, makeup,  or jewelry    - Wear comfortable clothing (button up shirt)     (Patient is required to have a responsible ride to transport home, ride may not leave while patient is in surgery)     -- [facility], 2nd floor Surgery Center, located   @ [address] [place] [id number] Floor Registration        If you have any questions or concerns please feel free to contact your surgeon's office.           Please reply to this message as receipt of  delivery.     Catina, LPN [facility]  Pre-Admit - Anesthesia Dept

## 2024-05-07 NOTE — PROGRESS NOTES
[facility] - New Patient Evaluation    Referred by: No ref. provider found   Reason for referral: DDD (degenerative disc disease), lumbar     CC:   Chief Complaint   Patient presents with    Follow-up     S/p 3/21 juliane L5/S1 Interlaminar          5/7/2024    10:47 AM 3/21/2024     2:19 PM 3/21/2024     2:11 PM   Last 3 PDI Scores   Pain Disability Index (PDI) 0 38 54     Interval history 05/07/2024  Anette Willis presents today for postprocedure follow up.  She is status post L5/S1 interlaminar epidural steroid injection on 04/01/2024 and reports 100% relief of her pain.  She was not currently taking any pain medications.  She reports her pain is 0/10.     Subjective 03/21/24:   Anette Willis is a 79 y.o. female who presents complaining low back pain that radiates down the right lower extremity.  Onset of pain was in November 2023.  No significant falls trauma or history of back surgeries.  Pt reports pain temporarily improved and then worsened again after lifting some heavy boxes.  She has been compliant with physical therapy and does note some improvement with PT.  She tried gabapentin for a few days prescribed back in November but discontinued it after finding no relief.  An MRI of the lumbar spine was significant for degenerative disc changes as well as a right-sided paracentral disc extrusion causing foraminal effacement at the L4-L5 level.  Severe foraminal stenosis at L4-L5 on the right.    Location: low back  Onset: 3-4 months   Current Pain Score: 6/10  Daily Pain of Range: 7-8/10  Quality: [employer]  Radiation: right leg and right thigh  Worsened by: lifting, standing for more than several minutes, walking for more than several minutes, and daily activity  Improved by: heat and sitting    Patient denies night fever/night sweats, urinary incontinence, bowel incontinence, significant weight loss, significant motor weakness, and loss of sensations.    Previous Interventions:  -  "04/01/2024 - L5/S1 WAN - 100% pain relief    Previous Therapies:  PT/OT: yes - some improvement   Chiropractor:   HEP:   Relevant Surgery: no   Previous Medications:   - NSAIDS:  Avoid due to blood thinners.  - Muscle Relaxants:    - TCAs:   - SNRIs:   - Topicals:   - Anticonvulsants:  Gabapentin 300 mg BID  - Opioids:  Tramadol p.r.n.  - Adjuvants:     Current Pain Medications:  None currently     Review of Systems:  ROS    GENERAL:  No weight loss, malaise or fevers.  HEENT:   No recent changes in vision or hearing  NECK:  No difficulty with swallowing. No stridor.   RESPIRATORY:  Negative for cough, wheezing or shortness of breath, patient denies any recent URI.  CARDIOVASCULAR:  Negative for chest pain, leg swelling or palpitations.  GI:  Negative for abdominal discomfort, blood in stools or black stools or change in bowel habits.  MUSCULOSKELETAL:  See HPI.  SKIN:  Negative for lesions, rash, and itching.  PSYCH:  No mood disorder or recent psychosocial stressors.    HEMATOLOGY/LYMPHOLOGY:  Negative for prolonged bleeding, bruising easily or swollen nodes.  Patient is not currently taking any anti-coagulants  NEURO:   No history of headaches, syncope, paralysis, seizures or tremors.  All other reviewed and negative other than HPI.    History:  Current medications, allergies, medical history, surgical history,   family history, and social history were reviewed in the chart as marked.    Full Medication List:    Current Outpatient Medications:     apixaban (ELIQUIS) 5 mg Tab, Take 5 mg by mouth 2 (two) times daily., Disp: , Rfl:     dabigatran etexilate (PRADAXA) 150 mg Cap, Take 1 capsule (150 mg total) by mouth 2 (two) times a day. "Do NOT break, chew, or open capsules.", Disp: 60 capsule, Rfl: 11    flecainide (TAMBOCOR) 100 MG Tab, Take 1 tablet (100 mg total) by mouth every 12 (twelve) hours., Disp: 60 tablet, Rfl: 11    ketorolac 0.5% (ACULAR) 0.5 % Drop, Place 1 drop into the right eye 3 (three) times " "daily., Disp: 5 mL, Rfl: 3    levothyroxine (SYNTHROID) 75 MCG tablet, Take 1 tablet (75 mcg total) by mouth every morning., Disp: 90 tablet, Rfl: 1    losartan (COZAAR) 100 MG tablet, Take 1 tablet (100 mg total) by mouth once daily., Disp: 90 tablet, Rfl: 1    metoprolol succinate (TOPROL-XL) 100 MG 24 hr tablet, Take 1 tablet (100 mg total) by mouth 2 (two) times daily., Disp: 90 tablet, Rfl: 3    ofloxacin (OCUFLOX) 0.3 % ophthalmic solution, Place 1 drop into the right eye 3 (three) times daily., Disp: 5 mL, Rfl: 3    prednisoLONE acetate (PRED FORTE) 1 % DrpS, Place 1 drop into the right eye 3 (three) times daily., Disp: 5 mL, Rfl: 3    rosuvastatin (CRESTOR) 10 MG tablet, Take 1 tablet (10 mg total) by mouth once daily., Disp: 90 tablet, Rfl: 1    traMADoL (ULTRAM) 50 mg tablet, Take 1 tablet (50 mg total) by mouth every 6 (six) hours as needed for Pain., Disp: 21 tablet, Rfl: 0     Allergies:  Patient has no known allergies.     Medical History:   has a past medical history of Hypertension and Mixed hyperlipidemia.    Surgical History:   has a past surgical history that includes Epidural steroid injection into lumbar spine (N/A, 4/1/2024).    Family History:  family history is not on file.    Social History:   reports that she has never smoked. She has never been exposed to tobacco smoke. She has never used smokeless tobacco.    Physical Exam:  Vitals:    05/07/24 1047   BP: 105/71   Pulse: 108   Weight: 71.3 kg (157 lb 4.8 oz)   Height: 5' 6" (1.676 m)   PainSc: 0-No pain       GENERAL: Well appearing, in no acute distress, alert and oriented x3.  PSYCH:  Mood and affect appropriate.  SKIN: Skin color, texture, turgor normal, no rashes or lesions.  HEAD/FACE:  Normocephalic, atraumatic. Cranial nerves grossly intact.  NECK: Normal ROM. Supple.   CV: RRR with palpation of the radial artery.  PULM: No evidence of respiratory difficulty, symmetric chest rise.  GI:  Soft and non-distended.  MSK: Straight leg " raising is negative to radicular pain. No pain to palpation over the facet joints of the lumbar spine. No pain with lumbar facet loading. No pain over the SI joints. IZAIAH test is negative. No obvious deformities, edema, or skin discoloration.  No atrophy or tone abnormalities are noted.   NEURO: Bilateral upper and lower extremity coordination and strength is symmetric.  No loss of sensation is noted.  MENTAL STATUS: A x O x 3, good concentration, speech is fluent and goal directed  MOTOR: 5/5 in bilateral lower extremity muscle groups  GAIT: Normal.  Ambulates unassisted.    Imaging:  MRI LUMBAR SPINE WITHOUT CONTRAST     CLINICAL HISTORY:  Lumbar radiculopathy, symptoms persist with conservative treatment; Radiculopathy, lumbar region     TECHNIQUE:  Multiplanar, multisequence MR images were acquired from the thoracolumbar junction to the sacrum without contrast.     COMPARISON:  None.     FINDINGS:  Alignment: Grade 1 anterolisthesis at L4-L5.     Vertebrae: No fracture or marrow infiltrative process.  Prominent hemangioma seen within the L4 vertebral body.  Focal signal void within the L3 vertebral body in keeping with bone island.     Discs: Fusion across the L5-S1 disc space.  Moderate disc height loss at L3-L4.  No evidence for discitis.     Cord: Conus terminates at L1 and appears unremarkable.  Cauda equina appears unremarkable.     Degenerative findings:     T12-L1: No spinal canal stenosis or neuroforaminal narrowing.     L1-L2: No spinal canal stenosis or neuroforaminal narrowing.     L2-L3: No spinal canal stenosis or neuroforaminal narrowing.     L3-L4: Circumferential disc bulge and mild facet arthropathy.  No spinal canal stenosis or neural foraminal narrowing.     L4-L5: Circumferential disc bulge and severe facet arthropathy noted.  There is a right paracentral/foraminal zone disc extrusion demonstrating 1.7 cm migration cranial to the inferior endplate of L4. these findings result in severe  effacement of the right lateral recess and severe right neural foraminal narrowing.  There is also mild central spinal canal stenosis.     L5-S1: Moderate facet arthropathy results in moderate left neural foraminal narrowing.     Paraspinal muscles & soft tissues: Mild paraspinal muscle atrophy.     Impression:     1. Degenerative changes of the lower lumbar spine detailed above.  Right paracentral/foraminal zone disc extrusion at L4-L5 contributes to severe effacement of the right lateral recess and severe right neural foraminal narrowing.        Electronically signed by: Valentín Hoyt MD  Date:                                            03/15/2024    Labs:  BMP  Lab Results   Component Value Date     (L) 04/22/2024    K 4.5 04/22/2024     04/22/2024    CO2 21 (L) 04/22/2024    BUN 16 04/22/2024    CREATININE 1.1 04/22/2024    CALCIUM 9.2 04/22/2024    ANIONGAP 11 04/22/2024    EGFRNORACEVR 51.8 (A) 04/22/2024     Lab Results   Component Value Date    ALT 26 04/22/2024    AST 28 04/22/2024    ALKPHOS 84 04/22/2024    BILITOT 0.3 04/22/2024     Lab Results   Component Value Date    WBC 12.71 (H) 04/22/2024    HGB 13.8 04/22/2024    HCT 41 04/22/2024    MCV 83 04/22/2024     04/22/2024         Assessment:  Problem List Items Addressed This Visit          Neuro    DDD (degenerative disc disease), lumbar - Primary     Other Visit Diagnoses       Lumbar radiculopathy        Chronic pain syndrome                  03/21/2024- Anette Willis is a 79 y.o. female who  has a past medical history of Hypertension and Mixed hyperlipidemia.  By history and examination this patient has chronic low back pain with radiculopathy.  The underlying cause is facet arthritis, degenerative disc disease, foraminal stenosis, and central canal stenosis.  Pathology is confirmed by imaging.  MRI lumbar spine was significant for degenerative changes throughout.  There is a right paracentral/foraminal disc extrusion at the L4/5  level which contributes to severe effacement of the right lateral recess causing severe right neural foraminal narrowing.  We discussed the underlying diagnoses and multiple treatment options including non-opioid medications, interventional procedures, physical therapy, home exercise, and core muscle enhancement.  She has been compliant with a physical therapy regimen with some improvement in her pain.  I feel like she would benefit from an L5/S1 interlaminar epidural steroid injection.  She was prescribed gabapentin in November at onset of the pain.  She discontinued this medication after a few days.  I do recommend that she restart it and explained that it works best when taken on a daily basis.  The risks and benefits of each treatment option were discussed and all questions were answered.      05/07/2024 - Anette Willis presents today for postprocedure follow up.  She is status post L5/S1 interlaminar epidural steroid injection on 04/01/2024 and reports 100% relief of her pain.  I will provide the patient with a home exercise regimen.  She may follow up in 2 months or sooner if needed.    Treatment Plan:   Procedures:    None at this time.  May repeat L5/S1 interlaminar epidural steroid injection as needed in the future.  PT/OT/HEP: I have stressed the importance of physical activity and a home exercise plan to help with pain and improve health.   I have provided the patient with the [facility] spine conditioning program to incorporate into her home exercise regimen.  Medications:   - She may take OTC Tylenol prn    -  Reviewed and consistent with medication use as prescribed.  Imaging:  MRI lumbar spine was reviewed and discussed with the patient using spine models.  Follow Up: RTC 2 months or sooner if needed     Naomi Santana DO   Interventional Pain Medicine / Anesthesiology    Disclaimer: This note was partly generated using dictation software which may occasionally result in transcription errors.

## 2024-05-08 ENCOUNTER — HOSPITAL ENCOUNTER (OUTPATIENT)
Facility: HOSPITAL | Age: 79
Discharge: HOME | End: 2024-05-08
Attending: OPHTHALMOLOGY | Admitting: OPHTHALMOLOGY
Payer: MEDICARE

## 2024-05-08 VITALS
HEART RATE: 98 BPM | SYSTOLIC BLOOD PRESSURE: 102 MMHG | DIASTOLIC BLOOD PRESSURE: 57 MMHG | TEMPERATURE: 98 F | OXYGEN SATURATION: 99 % | RESPIRATION RATE: 19 BRPM

## 2024-05-08 DIAGNOSIS — H25.11 NUCLEAR SCLEROTIC CATARACT OF RIGHT EYE: Primary | ICD-10-CM

## 2024-05-08 DIAGNOSIS — H25.10 AGE-RELATED NUCLEAR CATARACT: ICD-10-CM

## 2024-05-08 PROCEDURE — V2632 POST CHMBR INTRAOCULAR LENS: HCPCS | Performed by: OPHTHALMOLOGY

## 2024-05-08 PROCEDURE — 94761 N-INVAS EAR/PLS OXIMETRY MLT: CPT | Mod: 59

## 2024-05-08 PROCEDURE — 63600175 PHARM REV CODE 636 W HCPCS: Performed by: OPHTHALMOLOGY

## 2024-05-08 PROCEDURE — 66984 XCAPSL CTRC RMVL W/O ECP: CPT | Mod: RT,,, | Performed by: OPHTHALMOLOGY

## 2024-05-08 PROCEDURE — 99900035 HC TECH TIME PER 15 MIN (STAT)

## 2024-05-08 PROCEDURE — 25000003 PHARM REV CODE 250: Performed by: OPHTHALMOLOGY

## 2024-05-08 PROCEDURE — 27201423 OPTIME MED/SURG SUP & DEVICES STERILE SUPPLY: Performed by: OPHTHALMOLOGY

## 2024-05-08 PROCEDURE — 99152 MOD SED SAME PHYS/QHP 5/>YRS: CPT | Mod: ,,, | Performed by: OPHTHALMOLOGY

## 2024-05-08 PROCEDURE — 36000706: Performed by: OPHTHALMOLOGY

## 2024-05-08 PROCEDURE — 71000015 HC POSTOP RECOV 1ST HR: Performed by: OPHTHALMOLOGY

## 2024-05-08 PROCEDURE — 66999 UNLISTED PX ANT SEGMENT EYE: CPT | Mod: CSM,RT,, | Performed by: OPHTHALMOLOGY

## 2024-05-08 PROCEDURE — 36000707: Performed by: OPHTHALMOLOGY

## 2024-05-08 PROCEDURE — 99152 MOD SED SAME PHYS/QHP 5/>YRS: CPT | Performed by: OPHTHALMOLOGY

## 2024-05-08 DEVICE — LENS EYHANCE +15.0D: Type: IMPLANTABLE DEVICE | Site: EYE | Status: FUNCTIONAL

## 2024-05-08 RX ORDER — PHENYLEPHRINE HYDROCHLORIDE 25 MG/ML
1 SOLUTION/ DROPS OPHTHALMIC
Status: COMPLETED | OUTPATIENT
Start: 2024-05-08 | End: 2024-05-08

## 2024-05-08 RX ORDER — LIDOCAINE HYDROCHLORIDE 10 MG/ML
INJECTION, SOLUTION EPIDURAL; INFILTRATION; INTRACAUDAL; PERINEURAL
Status: DISCONTINUED | OUTPATIENT
Start: 2024-05-08 | End: 2024-05-08 | Stop reason: HOSPADM

## 2024-05-08 RX ORDER — MOXIFLOXACIN 5 MG/ML
1 SOLUTION/ DROPS OPHTHALMIC
Status: COMPLETED | OUTPATIENT
Start: 2024-05-08 | End: 2024-05-08

## 2024-05-08 RX ORDER — MIDAZOLAM HYDROCHLORIDE 1 MG/ML
2 INJECTION, SOLUTION INTRAMUSCULAR; INTRAVENOUS
Status: ACTIVE | OUTPATIENT
Start: 2024-05-08

## 2024-05-08 RX ORDER — FENTANYL CITRATE 50 UG/ML
25 INJECTION, SOLUTION INTRAMUSCULAR; INTRAVENOUS EVERY 5 MIN PRN
Status: ACTIVE | OUTPATIENT
Start: 2024-05-08

## 2024-05-08 RX ORDER — ACETAMINOPHEN 325 MG/1
650 TABLET ORAL EVERY 4 HOURS PRN
Status: DISCONTINUED | OUTPATIENT
Start: 2024-05-08 | End: 2024-05-08 | Stop reason: HOSPADM

## 2024-05-08 RX ORDER — TETRACAINE HYDROCHLORIDE 5 MG/ML
SOLUTION OPHTHALMIC
Status: DISCONTINUED | OUTPATIENT
Start: 2024-05-08 | End: 2024-05-08 | Stop reason: HOSPADM

## 2024-05-08 RX ORDER — PROPARACAINE HYDROCHLORIDE 5 MG/ML
SOLUTION/ DROPS OPHTHALMIC
Status: DISCONTINUED | OUTPATIENT
Start: 2024-05-08 | End: 2024-05-08 | Stop reason: HOSPADM

## 2024-05-08 RX ORDER — LIDOCAINE HYDROCHLORIDE 40 MG/ML
INJECTION, SOLUTION RETROBULBAR
Status: DISCONTINUED | OUTPATIENT
Start: 2024-05-08 | End: 2024-05-08 | Stop reason: HOSPADM

## 2024-05-08 RX ORDER — PHENYLEPHRINE HYDROCHLORIDE 100 MG/ML
1 SOLUTION/ DROPS OPHTHALMIC
Status: DISPENSED | OUTPATIENT
Start: 2024-05-08

## 2024-05-08 RX ORDER — TETRACAINE HYDROCHLORIDE 5 MG/ML
1 SOLUTION OPHTHALMIC
Status: COMPLETED | OUTPATIENT
Start: 2024-05-08 | End: 2024-05-08

## 2024-05-08 RX ORDER — TROPICAMIDE 10 MG/ML
1 SOLUTION/ DROPS OPHTHALMIC
Status: COMPLETED | OUTPATIENT
Start: 2024-05-08 | End: 2024-05-08

## 2024-05-08 RX ORDER — MOXIFLOXACIN 5 MG/ML
SOLUTION/ DROPS OPHTHALMIC
Status: DISCONTINUED | OUTPATIENT
Start: 2024-05-08 | End: 2024-05-08 | Stop reason: HOSPADM

## 2024-05-08 RX ORDER — PREDNISOLONE ACETATE 10 MG/ML
SUSPENSION/ DROPS OPHTHALMIC
Status: DISCONTINUED | OUTPATIENT
Start: 2024-05-08 | End: 2024-05-08 | Stop reason: HOSPADM

## 2024-05-08 RX ORDER — PROPARACAINE HYDROCHLORIDE 5 MG/ML
1 SOLUTION/ DROPS OPHTHALMIC
Status: DISCONTINUED | OUTPATIENT
Start: 2024-05-08 | End: 2024-05-08 | Stop reason: HOSPADM

## 2024-05-08 RX ADMIN — MOXIFLOXACIN 1 DROP: 5 SOLUTION/ DROPS OPHTHALMIC at 11:05

## 2024-05-08 RX ADMIN — TROPICAMIDE 1 DROP: 10 SOLUTION/ DROPS OPHTHALMIC at 10:05

## 2024-05-08 RX ADMIN — TETRACAINE HYDROCHLORIDE 1 DROP: 5 SOLUTION/ DROPS OPHTHALMIC at 10:05

## 2024-05-08 RX ADMIN — MIDAZOLAM HYDROCHLORIDE 2 MG: 1 INJECTION, SOLUTION INTRAMUSCULAR; INTRAVENOUS at 10:05

## 2024-05-08 RX ADMIN — FENTANYL CITRATE 25 MCG: 50 INJECTION, SOLUTION INTRAMUSCULAR; INTRAVENOUS at 10:05

## 2024-05-08 RX ADMIN — MOXIFLOXACIN OPHTHALMIC 1 DROP: 5 SOLUTION/ DROPS OPHTHALMIC at 10:05

## 2024-05-08 RX ADMIN — PHENYLEPHRINE HYDROCHLORIDE 1 DROP: 25 SOLUTION/ DROPS OPHTHALMIC at 10:05

## 2024-05-08 NOTE — OP NOTE
DATE OF PROCEDURE: 05/08/2024    SURGEON: ANNEMARIE GALINDO MD    PREOPERATIVE DIAGNOSIS:  Senile nuclear sclerotic cataract right eye.     POSTOPERATIVE DIAGNOSIS: Senile nuclear sclerotic cataract right eye.     PROCEDURE PERFORMED:  Phacoemulsification with placement of intraocular lens, right eye.    IMPLANT:  DIBOO  15.0    Anesthesia: Moderate sedation with topical Lidocaine. Patient ID confirmed and re-evaluated the patient and anesthesia plan confirming it is suitable for the patient's condition and procedure.     COMPLICATIONS: none    ESTIMATED BLOOD LOSS: <1cc    SPECIMENS: none    INDICATIONS FOR PROCEDURE:   The patient has a history of painless progressive vision loss.  The patient has described difficulties with activities of daily living, which specifically include driving, which is secondary to cataract formation and progression. After we had a thorough discussion about risks, benefits, and alternatives to cataract surgery, the patient agreed to proceed with phacoemulsification and implantation of a lens in the right eye.  These risks include, but are not limited to, hemorrhage, pain, infection, need for additional surgery, need for glasses or contacts, loss of vision, or even loss of the eye.    PROCEDURE IN DETAIL:  The patient was met in the preop holding area.  Consent was confirmed to be signed.  The operative site was marked.  The patient was brought into the operating room by the anesthesia team and placed under monitored anesthesia care.  The right eye was prepped and draped in a sterile ophthalmic fashion.  A Izabela speculum was placed into the right eye.   A paracentesis site was made and 1% preservative-free lidocaine was injected into the anterior chamber.  Viscoelastic  material was injected into the anterior chamber.  A keratome blade was used to make a clear corneal incision.  A cystotome was used to initiate the continuous curvilinear capsulorrhexis which was completed with Utrata  forceps.  BSS on a ventura cannula was used to perform hydrodissection.  The phacoemulsification tip was introduced into the eye and the nucleus was removed in a standard divide-and-conquer fashion.  Remaining cortical material was removed from the eye using irrigation-aspiration.  The capsular bag was filled with viscoelastic material and the intraocular lens was injected and positioned into place. Remaining viscoelastic material was removed from the eye using irrigation and aspiration.  The corneal wounds were hydrated.  The eye was filled to physiologic pressure. The wounds were found to be watertight. Drops of Vigamox and prednisilone were placed into the eye.  The eye was washed, dried, and shielded.  The patient tolerated the procedure well and knows to follow up with me tomorrow morning, sooner if needed.    The Catalys femtosecond laser was used prior to the cataract surgery portion in the laser suite to perform the capsulorhexis and lens fragmentation.    Intraoperative aberrometer (ORA) was used to confirm calculations.      Under my direct supervision, intravenous moderate sedation was administered during the course of this procedure, with continuous monitoring of hemodynamic parameters.  Monitoring of the patient's vital signs and respiratory status was provided by trained nursing staff during the entire course of the procedure and under my supervision and recorded in the patient's medical record.  The total time for sedation was 31 minutes.

## 2024-05-08 NOTE — DISCHARGE SUMMARY
[facility]  Discharge Note  Short Stay    Procedure(s) (LRB):  EXTRACTION, CATARACT, WITH IOL INSERTION (Right)    BRIEF DISCHARGE NOTE:    Date of discharge: 05/08/2024    Reason for hospitalization -  Cataract surgery     Final Diagnosis - Visually significant Cataract    Procedures and treatment provided - Status post phacoemulsification with placement of intraocular lens     Diet - Advance to regular as tolerated    Activity - as tolerated    Disposition at the end of the case - Good.    Discharge: to home    The patient tolerated the procedure well and knows to follow up with me tomorrow morning in the eye clinic, sooner if needed.    Patient and family instructions (as appropriate) - Given to patient on discharge    Alley Rogers MD

## 2024-05-08 NOTE — DISCHARGE INSTRUCTIONS
Dr. Rogers     Cataract Post-Operative Instructions       Day of surgery:     -Resume drops THREE times daily into the operative eye.     -Do not rub your eye     -Wear protective sunglasses during the day.     -Resume moderate activity.     -Bathe/shower/wash face normally     -Do not apply makeup around the operative eye for 1 week.     -You should expect:     Blurry vision and halos for 24-48 hours     Dilated pupil for 24-48 hours     Scratchy feeling in the eye for 1-2 days     Curved shadow in your peripheral vision for 2-3 weeks     Occasional flickering of lights for up to 1 week     -If you experience severe pain or nausea, call Dr. Rogers or the on-call doctor at [phone].       Plan to see Dr. Rogers at:     [facility] [address]     [address] [place] [address]    **Most patients can drive the next morning.  If you do not feel comfortable driving, please arrange for transportation. **

## 2024-05-09 ENCOUNTER — OFFICE VISIT (OUTPATIENT)
Dept: OPHTHALMOLOGY | Facility: CLINIC | Age: 79
End: 2024-05-09
Payer: MEDICARE

## 2024-05-09 DIAGNOSIS — Z96.1 STATUS POST CATARACT EXTRACTION AND INSERTION OF INTRAOCULAR LENS, RIGHT: ICD-10-CM

## 2024-05-09 DIAGNOSIS — H25.12 NUCLEAR SCLEROSIS OF LEFT EYE: Primary | ICD-10-CM

## 2024-05-09 DIAGNOSIS — Z98.41 STATUS POST CATARACT EXTRACTION AND INSERTION OF INTRAOCULAR LENS, RIGHT: ICD-10-CM

## 2024-05-09 DIAGNOSIS — H25.12 NUCLEAR SCLEROTIC CATARACT OF LEFT EYE: Primary | ICD-10-CM

## 2024-05-09 PROCEDURE — 99024 POSTOP FOLLOW-UP VISIT: CPT | Mod: POP,,, | Performed by: OPHTHALMOLOGY

## 2024-05-09 PROCEDURE — 99213 OFFICE O/P EST LOW 20 MIN: CPT | Mod: PBBFAC | Performed by: OPHTHALMOLOGY

## 2024-05-09 PROCEDURE — 99999 PR PBB SHADOW E&M-EST. PATIENT-LVL III: CPT | Mod: PBBFAC,,, | Performed by: OPHTHALMOLOGY

## 2024-05-09 RX ORDER — KETOROLAC TROMETHAMINE 5 MG/ML
1 SOLUTION OPHTHALMIC 3 TIMES DAILY
Qty: 5 ML | Refills: 3 | Status: SHIPPED | OUTPATIENT
Start: 2024-05-09

## 2024-05-09 RX ORDER — PREDNISOLONE ACETATE 10 MG/ML
1 SUSPENSION/ DROPS OPHTHALMIC 3 TIMES DAILY
Qty: 5 ML | Refills: 3 | Status: SHIPPED | OUTPATIENT
Start: 2024-05-09

## 2024-05-09 RX ORDER — OFLOXACIN 3 MG/ML
1 SOLUTION/ DROPS OPHTHALMIC 3 TIMES DAILY
Qty: 5 ML | Refills: 3 | Status: SHIPPED | OUTPATIENT
Start: 2024-05-09

## 2024-05-09 NOTE — PROGRESS NOTES
HPI     Post-op Evaluation     Additional comments: 1 day phaco OD           Comments    Ref: Dr. Ugatre  - (Just moved from [facility], pt's son    to  bety's daughter     S/p phaco OD 5/8/24 - distance w flacs  DRy AMD OU   ERM OD   PVD OU  NS OS    Ofloxacin TID OD  PF TID OD  Ketorolac TID OD    Patient here for 1 day phaco OD. Patient states OD doing well-no pain or   discomfort.           Last edited by Alley Rogers MD on 5/9/2024  1:36 PM.            Assessment /Plan     For exam results, see Encounter Report.    Status post cataract extraction and insertion of intraocular lens, right    Nuclear sclerosis of left eye      Slit Lamp Exam  L/L - normal  C/s - quiet  Cornea - clear  A/C - 1+ cell  Lens - PCIOL    POD #1 s/p phaco/IOL  - doing well  - continue the following drops:    vigamox or ocuflox TID x 1 wk then stop  Pred forte TID x  4 wks  Ketorolac TID until runs out    Versus:    Combination drop - 1 drop TID x total of 1 month    Appropriate precautions and post op medications reviewed.  Patient instructed to call or come in if symptoms of redness, decreased vision, or pain are experienced.    -f/up 1-2 wks, sooner PRN. Or 4 wks with optom for mrx if needed.                 OS - determine if pt wants distance vs. Int.

## 2024-05-13 ENCOUNTER — TELEPHONE (OUTPATIENT)
Dept: OPHTHALMOLOGY | Facility: CLINIC | Age: 79
End: 2024-05-13
Payer: MEDICARE

## 2024-05-13 NOTE — TELEPHONE ENCOUNTER
Called to inquire about pt call. Pt. Wanted to know she had another appt coming up. Informed pt after cataract sx there is a 1 day and 1 week post op.    ----- Message from Rosa Venegas sent at 5/13/2024 11:35 AM CDT -----  Contact: [phone]  Patient is calling to see if the 5/16/2024 appt is still needed. Please call to assist

## 2024-05-16 ENCOUNTER — OFFICE VISIT (OUTPATIENT)
Dept: OPHTHALMOLOGY | Facility: CLINIC | Age: 79
End: 2024-05-16
Payer: MEDICARE

## 2024-05-16 DIAGNOSIS — Z98.41 STATUS POST CATARACT EXTRACTION AND INSERTION OF INTRAOCULAR LENS, RIGHT: Primary | ICD-10-CM

## 2024-05-16 DIAGNOSIS — H25.12 NUCLEAR SCLEROSIS OF LEFT EYE: ICD-10-CM

## 2024-05-16 DIAGNOSIS — Z96.1 STATUS POST CATARACT EXTRACTION AND INSERTION OF INTRAOCULAR LENS, RIGHT: Primary | ICD-10-CM

## 2024-05-16 PROCEDURE — 99024 POSTOP FOLLOW-UP VISIT: CPT | Mod: POP,,, | Performed by: OPHTHALMOLOGY

## 2024-05-16 PROCEDURE — 99213 OFFICE O/P EST LOW 20 MIN: CPT | Mod: PBBFAC | Performed by: OPHTHALMOLOGY

## 2024-05-16 PROCEDURE — 92136 OPHTHALMIC BIOMETRY: CPT | Mod: PBBFAC | Performed by: OPHTHALMOLOGY

## 2024-05-16 PROCEDURE — 99999 PR PBB SHADOW E&M-EST. PATIENT-LVL III: CPT | Mod: PBBFAC,,, | Performed by: OPHTHALMOLOGY

## 2024-05-16 NOTE — PROGRESS NOTES
HPI    Ref: Dr. Ugarte  - (Just moved from [facility], pt's son    to  bety's daughter     S/p phaco OD 5/8/24 - distance w flacs  DRy AMD OU   ERM OD   PVD OU  NS OS    PF TID OD  Ketorolac TID OD    Patient is here today for 1 week phaco OD. No pain or discomfort.   Last edited by Ruth Patino on 5/16/2024  1:50 PM.            Assessment /Plan     For exam results, see Encounter Report.    Status post cataract extraction and insertion of intraocular lens, right    Nuclear sclerosis of left eye  -     IOL Master - OU - Both Eyes      PO week #1 s/p phaco/IOL -    - doing well, no issues    Continue combo drops for a total of 1 month versus: d/c abx gtt, continue PF/ketorolocTID for total of 1 month      Visually significant nuclear sclerotic cataract   - Interfering with activities of daily living.  Pt desires cataract surgery for [facility] rehabilitation.   - R/B/A discussed and pt agrees to proceed with surgery.   - IOL options discussed according to patient's goals and concomitant ocular pathology; and pt content with monofocal lens.    - Target: plano.     OD first - may do intermediate OS depending on outcome of OD    The patient expresses a desire to reduce spectacle dependence. I reviewed various IOL and LASER refractive surgical options and we will attempt to minimize spectacle dependence by managing astigmatism and optimizing IOL selection. Femtosecond LASER assisted cataract surgery (FLACS) technology and Intraoperative aberrometer (ORA) was explained to the patient.  The patient voices understanding and wishes to implement this technology during the cataract procedure.  I explained the increased precision of the LASER versus manual techniques, especially as it relates to astigmatism reduction with arcuate incisions.  I emphasized that although our goal is to reduce the need for refractive correction after surgery, there may still be a need for spectacle correction to achieve optimal  visual acuity, and that a reasonable range of functional vision should be the expectation.  No guarantees are made about post operative refraction or visual acuity, as the eye may heal in unpredictable ways, and the standard risks, benefits, and alternatives to cataract surgery were explained.  The patient understands that the refractive portions of this cataract procedure are not covered by insurance, and that there is an out of pocket expense of $ 1400 per eye. I also explained that even though our pre-operative plan is to utilize advanced refractive technologies during surgery, that I may decide to eliminate part or all of this plan if surgical challenges or complications arise, or I feel that it is not in the patient's best interest. Consent forms were given to the patient to review.     CATALYS Parameters:    Right Eye:   CATRACHITO:  12mm              Need to myron patient sitting up: NO  Capsulotomy: Scanned Capsule  rdGrdrrdarddrderd:rd rd3rd Arcuate: 10' at 85  Incisions: OFF  Left Eye:              CATRACHITO:  12mm              Need to myron patient sitting up: NO  Capsulotomy: Scanned Capsule  rdGrdrrdarddrderd:rd rd3rd Arcuate: 10' at 73  Incisions:  OFF         (Diboo 13.5 OS range)  Ora    Wears RGPs = needs CL holiday prior to calcs/ mumtaz OU - done.    High myopia.    Pt understands limited BCVA OD>OS 2/2 erm and amd    DRy AMD OD>OS  ERM OD  PVD Ou

## 2024-05-27 ENCOUNTER — OFFICE VISIT (OUTPATIENT)
Dept: CARDIOLOGY | Facility: CLINIC | Age: 79
End: 2024-05-27
Payer: MEDICARE

## 2024-05-27 ENCOUNTER — TELEPHONE (OUTPATIENT)
Dept: ELECTROPHYSIOLOGY | Facility: CLINIC | Age: 79
End: 2024-05-27
Payer: MEDICARE

## 2024-05-27 VITALS
SYSTOLIC BLOOD PRESSURE: 127 MMHG | HEART RATE: 112 BPM | WEIGHT: 162.25 LBS | DIASTOLIC BLOOD PRESSURE: 80 MMHG | BODY MASS INDEX: 26.19 KG/M2

## 2024-05-27 DIAGNOSIS — I48.0 PAROXYSMAL ATRIAL FIBRILLATION: ICD-10-CM

## 2024-05-27 DIAGNOSIS — I48.0 PAROXYSMAL ATRIAL FIBRILLATION: Primary | ICD-10-CM

## 2024-05-27 PROCEDURE — 93005 ELECTROCARDIOGRAM TRACING: CPT | Mod: PBBFAC | Performed by: INTERNAL MEDICINE

## 2024-05-27 PROCEDURE — 99999 PR PBB SHADOW E&M-EST. PATIENT-LVL IV: CPT | Mod: PBBFAC,,, | Performed by: INTERNAL MEDICINE

## 2024-05-27 PROCEDURE — 99214 OFFICE O/P EST MOD 30 MIN: CPT | Mod: S$PBB,25,, | Performed by: INTERNAL MEDICINE

## 2024-05-27 PROCEDURE — 93010 ELECTROCARDIOGRAM REPORT: CPT | Mod: S$PBB,,, | Performed by: INTERNAL MEDICINE

## 2024-05-27 PROCEDURE — 99214 OFFICE O/P EST MOD 30 MIN: CPT | Mod: PBBFAC | Performed by: INTERNAL MEDICINE

## 2024-05-27 NOTE — TELEPHONE ENCOUNTER
----- Message from Shelly Srivastava RN sent at 5/27/2024  4:17 PM CDT -----  Regarding: RE: appt needed  Offer a new spot on 6/11  ----- Message -----  From: Kizzy Chacon MA  Sent: 5/27/2024   3:55 PM CDT  To: Shelly Srivastava RN  Subject: RE: appt needed                                  Can you assist? It's nothing available?     Thanks  ----- Message -----  From: Oliva Martinez MA  Sent: 5/27/2024   3:50 PM CDT  To: José Luis Sloan Staff; Sincere DONNELLY Staff  Subject: appt needed                                      Dr. Damon is requesting this pt been seen ASAP  Pt saw Dr. Damon 5/27/2024 with EKG    Thank you  Dr. Damon Staff

## 2024-05-27 NOTE — PROGRESS NOTES
"Subjective:   05/27/2024     Patient ID:  Anette Willis is a 79 y.o. female who presents for evaulation of Atrial Fibrillation, Irregular Heart Beat, and Establish Care        Patient here for cardiac follow-up.  Has a history of atrial fibrillation, previously seen by EP in February.  See notes below. They had wish to see her back, does not appear to have happened.  Now noting weakness and near-syncope. EKG today shows recurrent atrial fib  Assessment:     1.Paroxysmal atrial fibrillation   2.SVT (supraventricular tachycardia)   3.Hypertension, unspecified type         Plan:     In summary, Mrs. Willis is a pleasant 79 year-old woman with hypertension and reported SVT presenting for evaluation for newly diagnosed atrial fibrillation. I had a long discussion with the patient about the pathophysiology and risks of atrial fibrillation and its basic pathophysiology, including its health implications and treatment options. Specifically, I addressed the need for CVA (stroke) prophylaxis with aspirin versus oral anticoagulation (warfarin vs DOACs, discussed bleeding risks, and need to come to the ER for any head trauma for CT scanning even if asymptomatic). [id number] score is 4 and oral anticoagulation is indicated. I also discussed the goal to reduce symptomatic arrhythmic episodes by pharmacologic and/or procedural methods and utilizing a rhythm versus a rate control strategy. Suspect "SVT" is a paroxysmal atrial tachycardia. Discussed drug therapy versus ablation. She prefers to start with an anti-arrhythmic drug. She has a structurally normal heart and no symptoms of ischemic heart disease. Will start flecainide 100mg bid. Requested she check with her drug insurance company if dabigatran is covered. If not recommend switching to warfarin at the end of her 90 days of eliquis.     Follow up in about 6 weeks (around 4/11/2024).  1.) Check with your drug insurance if Dabigatran (Pradaxa) is covered. If not we will need to " "switch your Eliquis to warfarin at the end of the 90 days of eliquis.     2.) To help keep you in normal rhythm I would like to add a drug called flecainide. If this is too expensive or causes unwanted side effects please let me know and we can find an alternative.           Past Medical History:   Diagnosis Date    Cataract     Epiretinal membrane (ERM) of both eyes     Hypertension     Macular degeneration     Mixed hyperlipidemia        Review of patient's allergies indicates:  No Known Allergies      Current Outpatient Medications:     apixaban (ELIQUIS) 5 mg Tab, Take 5 mg by mouth 2 (two) times daily., Disp: , Rfl:     dabigatran etexilate (PRADAXA) 150 mg Cap, Take 1 capsule (150 mg total) by mouth 2 (two) times a day. "Do NOT break, chew, or open capsules.", Disp: 60 capsule, Rfl: 11    flecainide (TAMBOCOR) 100 MG Tab, Take 1 tablet (100 mg total) by mouth every 12 (twelve) hours., Disp: 60 tablet, Rfl: 11    ketorolac 0.5% (ACULAR) 0.5 % Drop, Place 1 drop into the right eye 3 (three) times daily., Disp: 5 mL, Rfl: 3    ketorolac 0.5% (ACULAR) 0.5 % Drop, Place 1 drop into the left eye 3 (three) times daily., Disp: 5 mL, Rfl: 3    ketorolac 0.5% (ACULAR) 0.5 % Drop, Place 1 drop into the left eye 3 (three) times daily., Disp: 5 mL, Rfl: 3    levothyroxine (SYNTHROID) 75 MCG tablet, Take 1 tablet (75 mcg total) by mouth every morning., Disp: 90 tablet, Rfl: 1    losartan (COZAAR) 100 MG tablet, Take 1 tablet (100 mg total) by mouth once daily., Disp: 90 tablet, Rfl: 1    metoprolol succinate (TOPROL-XL) 100 MG 24 hr tablet, Take 1 tablet (100 mg total) by mouth 2 (two) times daily., Disp: 90 tablet, Rfl: 3    ofloxacin (OCUFLOX) 0.3 % ophthalmic solution, Place 1 drop into the right eye 3 (three) times daily., Disp: 5 mL, Rfl: 3    ofloxacin (OCUFLOX) 0.3 % ophthalmic solution, Place 1 drop into the left eye 3 (three) times daily., Disp: 5 mL, Rfl: 3    ofloxacin (OCUFLOX) 0.3 % ophthalmic solution, Place 1 " drop into the left eye 3 (three) times daily., Disp: 5 mL, Rfl: 3    prednisoLONE acetate (PRED FORTE) 1 % DrpS, Place 1 drop into the right eye 3 (three) times daily., Disp: 5 mL, Rfl: 3    prednisoLONE acetate (PRED FORTE) 1 % DrpS, Place 1 drop into the left eye 3 (three) times daily., Disp: 5 mL, Rfl: 3    prednisoLONE acetate (PRED FORTE) 1 % DrpS, Place 1 drop into the left eye 3 (three) times daily., Disp: 5 mL, Rfl: 3    rosuvastatin (CRESTOR) 10 MG tablet, Take 1 tablet (10 mg total) by mouth once daily., Disp: 90 tablet, Rfl: 1    traMADoL (ULTRAM) 50 mg tablet, Take 1 tablet (50 mg total) by mouth every 6 (six) hours as needed for Pain., Disp: 21 tablet, Rfl: 0  No current facility-administered medications for this visit.    Facility-Administered Medications Ordered in Other Visits:     fentaNYL 50 mcg/mL injection 25 mcg, 25 mcg, Intravenous, Q5 Min PRN, Alley Rogers MD, 25 mcg at 05/08/24 1055    midazolam injection 2 mg, 2 mg, Intravenous, PRN, Alley Rogers MD, 2 mg at 05/08/24 1044    phenylephrine HCL 10% ophthalmic solution 1 drop, 1 drop, Right Eye, PRN, Alley Rogers MD, 1 drop at 05/08/24 1026     Objective:   Review of Systems   Cardiovascular:  Negative for chest pain, claudication, cyanosis, dyspnea on exertion, irregular heartbeat, leg swelling, near-syncope, orthopnea, palpitations, paroxysmal nocturnal dyspnea and syncope.   Neurological:  Positive for light-headedness.         Vitals:    05/27/24 1457   BP: 127/80   Pulse: (!) 112     Wt Readings from Last 3 Encounters:   05/27/24 73.6 kg (162 lb 4.1 oz)   05/07/24 71.3 kg (157 lb 4.8 oz)   04/29/24 70.8 kg (156 lb 1.4 oz)     Temp Readings from Last 3 Encounters:   05/08/24 97.5 °F (36.4 °C)   04/23/24 98.2 °F (36.8 °C)   04/01/24 98.1 °F (36.7 °C) (Temporal)     BP Readings from Last 3 Encounters:   05/27/24 127/80   05/08/24 (!) 102/57   05/07/24 105/71     Pulse Readings from Last 3 Encounters:   05/27/24 (!) 112    05/08/24 98   05/07/24 108             Physical Exam  Vitals reviewed.   Constitutional:       General: She is not in acute distress.     Appearance: She is well-developed.   HENT:      Head: Normocephalic and atraumatic.      Nose: Nose normal.   Eyes:      Conjunctiva/sclera: Conjunctivae normal.      Pupils: Pupils are equal, round, and reactive to light.   Neck:      Vascular: No carotid bruit or JVD.   Cardiovascular:      Rate and Rhythm: Tachycardia present. Rhythm irregular.      Pulses: Intact distal pulses.      Heart sounds: Normal heart sounds. No murmur heard.     No friction rub. No gallop.   Pulmonary:      Effort: Pulmonary effort is normal. No respiratory distress.      Breath sounds: Normal breath sounds. No wheezing or rales.   Chest:      Chest wall: No tenderness.   Abdominal:      General: Bowel sounds are normal. There is no distension.      Palpations: Abdomen is soft.      Tenderness: There is no abdominal tenderness.   Musculoskeletal:         General: No tenderness or deformity. Normal range of motion.      Cervical back: Normal range of motion and neck supple.      Right lower leg: No edema.      Left lower leg: No edema.   Skin:     General: Skin is warm and dry.      Findings: No erythema or rash.   Neurological:      Mental Status: She is alert and oriented to person, place, and time.      Cranial Nerves: No cranial nerve deficit.      Motor: No abnormal muscle tone.      Coordination: Coordination normal.   Psychiatric:         Behavior: Behavior normal.         Thought Content: Thought content normal.         Judgment: Judgment normal.           Lab Results   Component Value Date    CHOL 133 03/05/2024    CHOL 148 03/10/2023     Lab Results   Component Value Date    HDL 36 (L) 03/05/2024    HDL 48 03/10/2023     Lab Results   Component Value Date    LDLCALC 73.8 03/05/2024    LDLCALC 82.4 03/10/2023     Lab Results   Component Value Date    ALT 26 04/22/2024    AST 28 04/22/2024     AST 20 03/05/2024    AST 24 03/10/2023     Lab Results   Component Value Date    CREATININE 1.1 04/22/2024    BUN 16 04/22/2024     (L) 04/22/2024    K 4.5 04/22/2024    CO2 21 (L) 04/22/2024    CO2 22 (L) 03/05/2024    CO2 22 (L) 03/10/2023     Lab Results   Component Value Date    HGB 13.8 04/22/2024    HCT 41 04/22/2024    HCT 40.9 04/22/2024    HCT 43.3 03/05/2024                         Assessment and Plan:     Paroxysmal atrial fibrillation  -     Ambulatory referral/consult to Cardiology  -     IN OFFICE EKG 12-LEAD (to [place])  -     IN OFFICE EKG 12-LEAD (to [place])  -     Ambulatory referral/consult to Cardiac Electrophysiology; Future; Expected date: 06/03/2024        Atrial fibrillation now recurrent, probably cause of symptoms.  She will need to follow-up with EP.  No follow-ups on file.          Future Appointments   Date Time Provider Department Center   6/6/2024  1:00 PM Alley Rogers MD [facility]   7/3/2024 10:00 AM Jose Owens FNP [facility]

## 2024-05-28 LAB
OHS QRS DURATION: 106 MS
OHS QTC CALCULATION: 445 MS

## 2024-06-03 ENCOUNTER — TELEPHONE (OUTPATIENT)
Dept: OPHTHALMOLOGY | Facility: CLINIC | Age: 79
End: 2024-06-03
Payer: MEDICARE

## 2024-06-03 NOTE — TELEPHONE ENCOUNTER
Spoke with pt provided arrival time of 8:30 for sx on 6/5/24 with Dr. Rogers @ [place]. Pt has eyedrops and packet.

## 2024-06-03 NOTE — H&P
"History    Chief complaint:  Painless progressive vision loss    Present Ilness/Diagnosis: Nuclear sclerotic Cataract    Past Medical History:  has a past medical history of Cataract, Epiretinal membrane (ERM) of both eyes, Hypertension, Macular degeneration, and Mixed hyperlipidemia.    Family History/Social History: refer to chart    Allergies: Review of patient's allergies indicates:  No Known Allergies    Current Medications: No current facility-administered medications for this encounter.    Current Outpatient Medications:     apixaban (ELIQUIS) 5 mg Tab, Take 5 mg by mouth 2 (two) times daily., Disp: , Rfl:     dabigatran etexilate (PRADAXA) 150 mg Cap, Take 1 capsule (150 mg total) by mouth 2 (two) times a day. "Do NOT break, chew, or open capsules.", Disp: 60 capsule, Rfl: 11    flecainide (TAMBOCOR) 100 MG Tab, Take 1 tablet (100 mg total) by mouth every 12 (twelve) hours., Disp: 60 tablet, Rfl: 11    ketorolac 0.5% (ACULAR) 0.5 % Drop, Place 1 drop into the right eye 3 (three) times daily., Disp: 5 mL, Rfl: 3    ketorolac 0.5% (ACULAR) 0.5 % Drop, Place 1 drop into the left eye 3 (three) times daily., Disp: 5 mL, Rfl: 3    ketorolac 0.5% (ACULAR) 0.5 % Drop, Place 1 drop into the left eye 3 (three) times daily., Disp: 5 mL, Rfl: 3    levothyroxine (SYNTHROID) 75 MCG tablet, Take 1 tablet (75 mcg total) by mouth every morning., Disp: 90 tablet, Rfl: 1    losartan (COZAAR) 100 MG tablet, Take 1 tablet (100 mg total) by mouth once daily., Disp: 90 tablet, Rfl: 1    metoprolol succinate (TOPROL-XL) 100 MG 24 hr tablet, Take 1 tablet (100 mg total) by mouth 2 (two) times daily., Disp: 90 tablet, Rfl: 3    ofloxacin (OCUFLOX) 0.3 % ophthalmic solution, Place 1 drop into the right eye 3 (three) times daily., Disp: 5 mL, Rfl: 3    ofloxacin (OCUFLOX) 0.3 % ophthalmic solution, Place 1 drop into the left eye 3 (three) times daily., Disp: 5 mL, Rfl: 3    ofloxacin (OCUFLOX) 0.3 % ophthalmic solution, Place 1 drop " into the left eye 3 (three) times daily., Disp: 5 mL, Rfl: 3    prednisoLONE acetate (PRED FORTE) 1 % DrpS, Place 1 drop into the right eye 3 (three) times daily., Disp: 5 mL, Rfl: 3    prednisoLONE acetate (PRED FORTE) 1 % DrpS, Place 1 drop into the left eye 3 (three) times daily., Disp: 5 mL, Rfl: 3    prednisoLONE acetate (PRED FORTE) 1 % DrpS, Place 1 drop into the left eye 3 (three) times daily., Disp: 5 mL, Rfl: 3    rosuvastatin (CRESTOR) 10 MG tablet, Take 1 tablet (10 mg total) by mouth once daily., Disp: 90 tablet, Rfl: 1    traMADoL (ULTRAM) 50 mg tablet, Take 1 tablet (50 mg total) by mouth every 6 (six) hours as needed for Pain., Disp: 21 tablet, Rfl: 0    Facility-Administered Medications Ordered in Other Encounters:     fentaNYL 50 mcg/mL injection 25 mcg, 25 mcg, Intravenous, Q5 Min PRN, Alley Rogers MD, 25 mcg at 05/08/24 1055    midazolam injection 2 mg, 2 mg, Intravenous, PRN, Alley Rogers MD, 2 mg at 05/08/24 1044    phenylephrine HCL 10% ophthalmic solution 1 drop, 1 drop, Right Eye, PRN, Alley Rogers MD, 1 drop at 05/08/24 1026    ASA Score: II     Mallampati Score: II     Plan for Sedation: Moderate Sedation     Patient or Family History of Anesthesia problems : No    Physical Exam    BP: Vital signs stable  General: No apparent distress  HEENT: nuclear sclerotic cataract  Lungs: adequate respirations  Heart: + pulses  Abdomen: soft  Rectal/pelvic: deferred    Impression: Visually significant Cataract.    See previous clinic notes for surgical indications.    Plan: Phacoemulsification with implantation of Intraocular lens

## 2024-06-04 NOTE — PRE-PROCEDURE INSTRUCTIONS
The following was discussed with pt via phone and sent to pt portal. Pt verbalized understanding. Pt to be accompanied by her  and son.    ARRIVAL TIME: 7:30am    - Nothing to eat or drink after midnight the night before your surgery, except AM meds with small sips of water    - HOLD all Diabetic meds AM of surgery  - HOLD all Insulin AM of surgery  - HOLD all Fluid pills AM of surgery  - HOLD all non-insulin shots until after surgery (Ozempic, Mounjaro, Trulicity, Victoza, Byetta, Wegovy and Adlyxin) (up to 7 days prior)  - HOLD all vits and herbal meds AM of surgery    - TAKE blood thinner meds AM of surgery (unless otherwise directed)  - TAKE all B/P meds, EXCEPT those that contain a fluid pill  - USE inhalers as needed and bring AM of surgery  - USE eye drops as directed    - Shower and wash face with dial soap for 3 mins PM prior and AM of surgery  - No powder, lotions, creams, (makeup),  or jewelry    - Wear comfortable clothing (button up shirt)     (Patients ride may not leave while patient is in surgery)     -- 2nd floor surgery ctr at [facility] @ [address], [place] [address]

## 2024-06-05 ENCOUNTER — TELEPHONE (OUTPATIENT)
Dept: ELECTROPHYSIOLOGY | Facility: CLINIC | Age: 79
End: 2024-06-05
Payer: MEDICARE

## 2024-06-05 ENCOUNTER — HOSPITAL ENCOUNTER (OUTPATIENT)
Facility: HOSPITAL | Age: 79
Discharge: HOME | End: 2024-06-05
Attending: OPHTHALMOLOGY | Admitting: OPHTHALMOLOGY
Payer: MEDICARE

## 2024-06-05 VITALS
RESPIRATION RATE: 16 BRPM | BODY MASS INDEX: 25.71 KG/M2 | WEIGHT: 160 LBS | HEART RATE: 103 BPM | DIASTOLIC BLOOD PRESSURE: 59 MMHG | TEMPERATURE: 98 F | HEIGHT: 66 IN | OXYGEN SATURATION: 96 % | SYSTOLIC BLOOD PRESSURE: 87 MMHG

## 2024-06-05 DIAGNOSIS — H25.12 AGE-RELATED NUCLEAR CATARACT, LEFT: ICD-10-CM

## 2024-06-05 DIAGNOSIS — H25.12 NUCLEAR SCLEROTIC CATARACT OF LEFT EYE: Primary | ICD-10-CM

## 2024-06-05 PROCEDURE — 66984 XCAPSL CTRC RMVL W/O ECP: CPT | Mod: 79,LT,, | Performed by: OPHTHALMOLOGY

## 2024-06-05 PROCEDURE — 99152 MOD SED SAME PHYS/QHP 5/>YRS: CPT | Performed by: OPHTHALMOLOGY

## 2024-06-05 PROCEDURE — 36000706: Performed by: OPHTHALMOLOGY

## 2024-06-05 PROCEDURE — 27201423 OPTIME MED/SURG SUP & DEVICES STERILE SUPPLY: Performed by: OPHTHALMOLOGY

## 2024-06-05 PROCEDURE — 71000015 HC POSTOP RECOV 1ST HR: Performed by: OPHTHALMOLOGY

## 2024-06-05 PROCEDURE — 99900035 HC TECH TIME PER 15 MIN (STAT)

## 2024-06-05 PROCEDURE — 36000707: Performed by: OPHTHALMOLOGY

## 2024-06-05 PROCEDURE — 25000003 PHARM REV CODE 250: Performed by: OPHTHALMOLOGY

## 2024-06-05 PROCEDURE — 99152 MOD SED SAME PHYS/QHP 5/>YRS: CPT | Mod: ,,, | Performed by: OPHTHALMOLOGY

## 2024-06-05 PROCEDURE — 66999 UNLISTED PX ANT SEGMENT EYE: CPT | Mod: CSM,LT,, | Performed by: OPHTHALMOLOGY

## 2024-06-05 PROCEDURE — 63600175 PHARM REV CODE 636 W HCPCS: Performed by: OPHTHALMOLOGY

## 2024-06-05 PROCEDURE — V2632 POST CHMBR INTRAOCULAR LENS: HCPCS | Performed by: OPHTHALMOLOGY

## 2024-06-05 PROCEDURE — 94761 N-INVAS EAR/PLS OXIMETRY MLT: CPT

## 2024-06-05 DEVICE — LENS EYHANCE +14.0D: Type: IMPLANTABLE DEVICE | Site: EYE | Status: FUNCTIONAL

## 2024-06-05 RX ORDER — FENTANYL CITRATE 50 UG/ML
25 INJECTION, SOLUTION INTRAMUSCULAR; INTRAVENOUS EVERY 5 MIN PRN
Status: DISCONTINUED | OUTPATIENT
Start: 2024-06-05 | End: 2024-06-05 | Stop reason: HOSPADM

## 2024-06-05 RX ORDER — PHENYLEPHRINE HYDROCHLORIDE 100 MG/ML
1 SOLUTION/ DROPS OPHTHALMIC
Status: DISCONTINUED | OUTPATIENT
Start: 2024-06-05 | End: 2024-06-05 | Stop reason: HOSPADM

## 2024-06-05 RX ORDER — ACETAMINOPHEN 325 MG/1
650 TABLET ORAL EVERY 4 HOURS PRN
Status: DISCONTINUED | OUTPATIENT
Start: 2024-06-05 | End: 2024-06-05 | Stop reason: HOSPADM

## 2024-06-05 RX ORDER — MOXIFLOXACIN 5 MG/ML
SOLUTION/ DROPS OPHTHALMIC
Status: DISCONTINUED | OUTPATIENT
Start: 2024-06-05 | End: 2024-06-05 | Stop reason: HOSPADM

## 2024-06-05 RX ORDER — TETRACAINE HYDROCHLORIDE 5 MG/ML
1 SOLUTION OPHTHALMIC
Status: COMPLETED | OUTPATIENT
Start: 2024-06-05 | End: 2024-06-05

## 2024-06-05 RX ORDER — TROPICAMIDE 10 MG/ML
1 SOLUTION/ DROPS OPHTHALMIC
Status: COMPLETED | OUTPATIENT
Start: 2024-06-05 | End: 2024-06-05

## 2024-06-05 RX ORDER — PHENYLEPHRINE HYDROCHLORIDE 25 MG/ML
1 SOLUTION/ DROPS OPHTHALMIC
Status: COMPLETED | OUTPATIENT
Start: 2024-06-05 | End: 2024-06-05

## 2024-06-05 RX ORDER — PROPARACAINE HYDROCHLORIDE 5 MG/ML
1 SOLUTION/ DROPS OPHTHALMIC
Status: DISCONTINUED | OUTPATIENT
Start: 2024-06-05 | End: 2024-06-05 | Stop reason: HOSPADM

## 2024-06-05 RX ORDER — MOXIFLOXACIN 5 MG/ML
1 SOLUTION/ DROPS OPHTHALMIC
Status: COMPLETED | OUTPATIENT
Start: 2024-06-05 | End: 2024-06-05

## 2024-06-05 RX ORDER — PREDNISOLONE ACETATE 10 MG/ML
SUSPENSION/ DROPS OPHTHALMIC
Status: DISCONTINUED | OUTPATIENT
Start: 2024-06-05 | End: 2024-06-05 | Stop reason: HOSPADM

## 2024-06-05 RX ORDER — LIDOCAINE HYDROCHLORIDE 40 MG/ML
INJECTION, SOLUTION RETROBULBAR
Status: DISCONTINUED | OUTPATIENT
Start: 2024-06-05 | End: 2024-06-05 | Stop reason: HOSPADM

## 2024-06-05 RX ORDER — MIDAZOLAM HYDROCHLORIDE 1 MG/ML
2 INJECTION, SOLUTION INTRAMUSCULAR; INTRAVENOUS
Status: DISCONTINUED | OUTPATIENT
Start: 2024-06-05 | End: 2024-06-05 | Stop reason: HOSPADM

## 2024-06-05 RX ORDER — TETRACAINE HYDROCHLORIDE 5 MG/ML
SOLUTION OPHTHALMIC
Status: DISCONTINUED | OUTPATIENT
Start: 2024-06-05 | End: 2024-06-05 | Stop reason: HOSPADM

## 2024-06-05 RX ORDER — LIDOCAINE HYDROCHLORIDE 10 MG/ML
INJECTION, SOLUTION EPIDURAL; INFILTRATION; INTRACAUDAL; PERINEURAL
Status: DISCONTINUED | OUTPATIENT
Start: 2024-06-05 | End: 2024-06-05 | Stop reason: HOSPADM

## 2024-06-05 RX ORDER — PROPARACAINE HYDROCHLORIDE 5 MG/ML
SOLUTION/ DROPS OPHTHALMIC
Status: DISCONTINUED | OUTPATIENT
Start: 2024-06-05 | End: 2024-06-05 | Stop reason: HOSPADM

## 2024-06-05 RX ADMIN — TROPICAMIDE 1 DROP: 10 SOLUTION/ DROPS OPHTHALMIC at 07:06

## 2024-06-05 RX ADMIN — PHENYLEPHRINE HYDROCHLORIDE 1 DROP: 25 SOLUTION/ DROPS OPHTHALMIC at 07:06

## 2024-06-05 RX ADMIN — MOXIFLOXACIN OPHTHALMIC 1 DROP: 5 SOLUTION/ DROPS OPHTHALMIC at 07:06

## 2024-06-05 RX ADMIN — MIDAZOLAM HYDROCHLORIDE 2 MG: 1 INJECTION, SOLUTION INTRAMUSCULAR; INTRAVENOUS at 09:06

## 2024-06-05 RX ADMIN — MOXIFLOXACIN 1 DROP: 5 SOLUTION/ DROPS OPHTHALMIC at 09:06

## 2024-06-05 RX ADMIN — TETRACAINE HYDROCHLORIDE 1 DROP: 5 SOLUTION OPHTHALMIC at 07:06

## 2024-06-05 RX ADMIN — MOXIFLOXACIN OPHTHALMIC 1 DROP: 5 SOLUTION/ DROPS OPHTHALMIC at 08:06

## 2024-06-05 RX ADMIN — TETRACAINE HYDROCHLORIDE 1 DROP: 5 SOLUTION OPHTHALMIC at 08:06

## 2024-06-05 RX ADMIN — PHENYLEPHRINE HYDROCHLORIDE 1 DROP: 25 SOLUTION/ DROPS OPHTHALMIC at 08:06

## 2024-06-05 RX ADMIN — TROPICAMIDE 1 DROP: 10 SOLUTION/ DROPS OPHTHALMIC at 08:06

## 2024-06-05 NOTE — DISCHARGE INSTRUCTIONS
Dr. Rogers     Cataract Post-Operative Instructions       Day of surgery:     -Resume drops THREE times daily into the operative eye.     -Do not rub your eye     -Wear protective sunglasses during the day.     -Resume moderate activity.     -Bathe/shower/wash face normally     -Do not apply makeup around the operative eye for 1 week.     -You should expect:     Blurry vision and halos for 24-48 hours     Dilated pupil for 24-48 hours     Scratchy feeling in the eye for 1-2 days     Curved shadow in your peripheral vision for 2-3 weeks     Occasional flickering of lights for up to 1 week     -If you experience severe pain or nausea, call Dr. Rogers or the on-call doctor at [phone].       Plan to see Dr. Rogers at:     [facility] [address]     [address] [place] [address]    **Most patients can drive the next morning.  If you do not feel comfortable driving, please arrange for transportation. **

## 2024-06-05 NOTE — OP NOTE
DATE OF PROCEDURE: 06/05/2024    SURGEON: ANNEMARIE GALINDO MD    PREOPERATIVE DIAGNOSIS:  Senile nuclear sclerotic cataract left eye.     POSTOPERATIVE DIAGNOSIS: Senile nuclear sclerotic cataract left eye.     PROCEDURE PERFORMED:  Phacoemulsification with placement of intraocular lens, left eye.    IMPLANT:  DIB00 14.0    Anesthesia: Moderate sedation with topical Lidocaine. Patient ID confirmed and re-evaluated the patient and anesthesia plan confirming it is suitable for the patient's condition and procedure.     COMPLICATIONS: none    ESTIMATED BLOOD LOSS: <1cc    SPECIMENS: none    INDICATIONS FOR PROCEDURE:   The patient has a history of painless progressive vision loss.  The patient has described difficulties with activities of daily living, which specifically include driving, which is secondary to cataract formation and progression. After we had a thorough discussion about risks, benefits, and alternatives to cataract surgery, the patient agreed to proceed with phacoemulsification and implantation of a lens in the left eye.  These risks include, but are not limited to, hemorrhage, pain, infection, need for additional surgery, need for glasses or contacts, loss of vision, or even loss of the eye.    PROCEDURE IN DETAIL:  The patient was met in the preop holding area.  Consent was confirmed to be signed.  The operative site was marked.  The patient was brought into the operating room by the anesthesia team and placed under monitored anesthesia care.  The left eye was prepped and draped in a sterile ophthalmic fashion.  A Izabela speculum was placed into the left eye.   A paracentesis site was made and 1% preservative-free lidocaine was injected into the anterior chamber.  Viscoelastic  material was injected into the anterior chamber.  A keratome blade was used to make a clear corneal incision.  A cystotome was used to initiate the continuous curvilinear capsulorrhexis which was completed with Utrata forceps.   BSS on a ventura cannula was used to perform hydrodissection.  The phacoemulsification tip was introduced into the eye and the nucleus was removed in a standard divide-and-conquer fashion.  Remaining cortical material was removed from the eye using irrigation-aspiration.  The capsular bag was filled with viscoelastic material and the intraocular lens was injected and positioned into place. Remaining viscoelastic material was removed from the eye using irrigation and aspiration.  The corneal wounds were hydrated.  The eye was filled to physiologic pressure. The wounds were found to be watertight. Drops of Vigamox and prednisilone were placed into the eye.  The eye was washed, dried, and shielded.  The patient tolerated the procedure well and knows to follow up with me tomorrow morning, sooner if needed.    The Catalys femtosecond laser was used prior to the cataract surgery portion in the laser suite to perform the capsulorhexis and lens fragmentation.    Intraoperative aberrometer (ORA) was used to confirm calculations.        Under my direct supervision, intravenous moderate sedation was administered during the course of this procedure, with continuous monitoring of hemodynamic parameters.  Monitoring of the patient's vital signs and respiratory status was provided by trained nursing staff during the entire course of the procedure and under my supervision and recorded in the patient's medical record.  The total time for sedation was 11 minutes.

## 2024-06-05 NOTE — DISCHARGE SUMMARY
[facility]  Discharge Note  Short Stay    Procedure(s) (LRB):  EXTRACTION, CATARACT, WITH IOL INSERTION (Left)  BRIEF DISCHARGE NOTE:    Date of discharge: 06/05/2024    Reason for hospitalization -  Cataract surgery     Final Diagnosis - Visually significant Cataract    Procedures and treatment provided - Status post phacoemulsification with placement of intraocular lens     Diet - Advance to regular as tolerated    Activity - as tolerated    Disposition at the end of the case - Good.    Discharge: to home    The patient tolerated the procedure well and knows to follow up with me tomorrow morning in the eye clinic, sooner if needed.    Patient and family instructions (as appropriate) - Given to patient on discharge    Alley Rogers MD

## 2024-06-06 ENCOUNTER — OFFICE VISIT (OUTPATIENT)
Dept: OPHTHALMOLOGY | Facility: CLINIC | Age: 79
End: 2024-06-06
Payer: MEDICARE

## 2024-06-06 DIAGNOSIS — Z98.42 STATUS POST CATARACT EXTRACTION AND INSERTION OF INTRAOCULAR LENS, LEFT: Primary | ICD-10-CM

## 2024-06-06 DIAGNOSIS — Z96.1 STATUS POST CATARACT EXTRACTION AND INSERTION OF INTRAOCULAR LENS, LEFT: Primary | ICD-10-CM

## 2024-06-06 PROCEDURE — 99999 PR PBB SHADOW E&M-EST. PATIENT-LVL III: CPT | Mod: PBBFAC,,, | Performed by: OPHTHALMOLOGY

## 2024-06-06 PROCEDURE — 99024 POSTOP FOLLOW-UP VISIT: CPT | Mod: POP,,, | Performed by: OPHTHALMOLOGY

## 2024-06-06 PROCEDURE — 99213 OFFICE O/P EST LOW 20 MIN: CPT | Mod: PBBFAC | Performed by: OPHTHALMOLOGY

## 2024-06-06 NOTE — PROGRESS NOTES
HPI     Post-op Evaluation     Additional comments: 1 day phaco OS           Comments    Ref: Dr. Ugarte  - (Just moved from [facility], pt's son    to  bety's daughter     S/p phaco OS 6/5/24  S/p phaco OD 5/8/24 - distance w flacs  DRy AMD OU   ERM OD   PVD OU    Ofloxacin TID OS  PF TID OS  Ketorolac TID OS    Patient here for 1 day phaco OS. Patient states OS doing well- no pain or   discomfort. Feeling tired today.            Last edited by Kiana Nunes MA on 6/6/2024  1:04 PM.            Assessment /Plan     For exam results, see Encounter Report.    Status post cataract extraction and insertion of intraocular lens, left                       Slit Lamp Exam  L/L - normal  C/s - quiet  Cornea - clear  A/C - 1+ cell  Lens - PCIOL    POD #1 s/p phaco/IOL  - doing well  - continue the following drops:    vigamox or ocuflox TID x 1 wk then stop  Pred forte TID x  4 wks  Ketorolac TID until runs out    Versus:    Combination drop - 1 drop TID x total of 1 month    Appropriate precautions and post op medications reviewed.  Patient instructed to call or come in if symptoms of redness, decreased vision, or pain are experienced.    -f/up 1-2 wks, sooner PRN. Or 4 wks with optom for mrx if needed.

## 2024-06-11 ENCOUNTER — OFFICE VISIT (OUTPATIENT)
Dept: ELECTROPHYSIOLOGY | Facility: CLINIC | Age: 79
End: 2024-06-11
Payer: MEDICARE

## 2024-06-11 ENCOUNTER — PATIENT MESSAGE (OUTPATIENT)
Dept: ELECTROPHYSIOLOGY | Facility: CLINIC | Age: 79
End: 2024-06-11

## 2024-06-11 ENCOUNTER — HOSPITAL ENCOUNTER (OUTPATIENT)
Dept: CARDIOLOGY | Facility: CLINIC | Age: 79
Discharge: HOME | End: 2024-06-11
Payer: MEDICARE

## 2024-06-11 VITALS
HEIGHT: 66 IN | HEART RATE: 54 BPM | SYSTOLIC BLOOD PRESSURE: 100 MMHG | WEIGHT: 159.63 LBS | BODY MASS INDEX: 25.66 KG/M2 | DIASTOLIC BLOOD PRESSURE: 60 MMHG

## 2024-06-11 DIAGNOSIS — I48.0 PAROXYSMAL ATRIAL FIBRILLATION: Primary | ICD-10-CM

## 2024-06-11 DIAGNOSIS — I10 HYPERTENSION, UNSPECIFIED TYPE: ICD-10-CM

## 2024-06-11 DIAGNOSIS — I47.10 SVT (SUPRAVENTRICULAR TACHYCARDIA): ICD-10-CM

## 2024-06-11 DIAGNOSIS — I70.0 AORTIC ATHEROSCLEROSIS: ICD-10-CM

## 2024-06-11 DIAGNOSIS — I48.0 PAROXYSMAL ATRIAL FIBRILLATION: ICD-10-CM

## 2024-06-11 LAB
OHS QRS DURATION: 94 MS
OHS QTC CALCULATION: 441 MS

## 2024-06-11 PROCEDURE — 99215 OFFICE O/P EST HI 40 MIN: CPT | Mod: S$PBB,,, | Performed by: INTERNAL MEDICINE

## 2024-06-11 PROCEDURE — 99214 OFFICE O/P EST MOD 30 MIN: CPT | Mod: PBBFAC,25 | Performed by: INTERNAL MEDICINE

## 2024-06-11 PROCEDURE — 93010 ELECTROCARDIOGRAM REPORT: CPT | Mod: S$PBB,,, | Performed by: INTERNAL MEDICINE

## 2024-06-11 PROCEDURE — 99999 PR PBB SHADOW E&M-EST. PATIENT-LVL IV: CPT | Mod: PBBFAC,,, | Performed by: INTERNAL MEDICINE

## 2024-06-11 PROCEDURE — 93005 ELECTROCARDIOGRAM TRACING: CPT | Mod: PBBFAC | Performed by: INTERNAL MEDICINE

## 2024-06-11 NOTE — PROGRESS NOTES
"Subjective:   Patient ID:  Anette Willis is a 79 y.o. female who presents for evaluation of Atrial Fibrillation    Referring Cardiology Providers: Neil Dubois MD and Abby Richards MD  Primary Care Physician: Simon Hernandez MD    HPI  Prior Hx:  I had the pleasure of seeing Mrs. Willis today in our electrophysiology clinic in follow-up for her atrial fibrillation. As you are aware she is a pleasant 79 year-old woman with hypertension and SVT. She has a long history of palpitations, previously determined to be SVT. She was treated with metoprolol by her cardiologist when she lived in [place]. She saw Dr. Dubois to establish care after moving to [place]. A 30 day event monitor was ordered. She was observed to have an episode of AF with RVR for which she had been referred and seen in 2/2024. She was started on eliquis. No ECGs from [place] of SVT. Eliquis is very unaffordable. She paid $800 for a 90 day supply. Suspect "SVT" is a paroxysmal atrial tachycardia. Discussed drug therapy versus ablation. She preferred to start with an anti-arrhythmic drug. She has a structurally normal heart and no symptoms of ischemic heart disease. Will start flecainide 100mg bid. Requested she check with her drug insurance company if dabigatran is covered. If not recommend switching to warfarin at the end of her 90 days of eliquis    ECHO 2/2024: normal LVEF, normal [place] size    4/2024: Mrs. Willis returned for follow-up. Feels great. Concerned with her resting heart rate in the 50s on her meds. This is asymptomatic. She has not called her insurance yet about Dabigatran coverage. Suspect "SVT" is a paroxysmal atrial tachycardia. Discussed drug therapy versus ablation. She preferred to start AAD therapy. She is on flecainide/metoprolol. This is working well for her. Will lower metoprolol succinate to 100mg once daily. Recommended again to contact her insurance regarding coverage of Dabigatran. Should this be unaffordable would " recommend transition to warfarin with goal INR of 2-3 once she runs out of eliquis.    Interim Hx:  Mrs. Willis returns for follow-up. She has had recurrence of AF with associated weakness. Saw Dr. Damon in May 2024 and was in AF. In sinus rhythm today.    My interpretation of today's in-clinic ECG is sinus rhythm with a narrow QRS and rate of 54 bpm.    Review of Systems   Constitutional: Negative for fever and malaise/fatigue.   HENT:  Negative for congestion and sore throat.    Eyes:  Negative for blurred vision and visual disturbance.   Cardiovascular:  Negative for chest pain, dyspnea on exertion, irregular heartbeat, near-syncope, palpitations and syncope.   Respiratory:  Negative for cough and shortness of breath.    Hematologic/Lymphatic: Negative for bleeding problem. Does not bruise/bleed easily.   Skin: Negative.    Musculoskeletal:  Positive for arthritis and joint pain.   Gastrointestinal:  Negative for bloating, abdominal pain, hematochezia and melena.   Neurological:  Negative for focal weakness and weakness.   Psychiatric/Behavioral: Negative.         Objective:   Physical Exam  Vitals reviewed.   Constitutional:       General: She is not in acute distress.     Appearance: She is well-developed. She is not diaphoretic.   HENT:      Head: Normocephalic and atraumatic.   Eyes:      General:         Right eye: No discharge.         Left eye: No discharge.      Conjunctiva/sclera: Conjunctivae normal.   Cardiovascular:      Rate and Rhythm: Normal rate and regular rhythm.      Heart sounds: No murmur heard.     No friction rub. No gallop.   Pulmonary:      Effort: Pulmonary effort is normal. No respiratory distress.      Breath sounds: Normal breath sounds. No wheezing or rales.   Abdominal:      General: Bowel sounds are normal. There is no distension.      Palpations: Abdomen is soft.      Tenderness: There is no abdominal tenderness.   Musculoskeletal:      Cervical back: Neck supple.   Skin:      "General: Skin is warm and dry.   Neurological:      Mental Status: She is alert and oriented to person, place, and time.   Psychiatric:         Behavior: Behavior normal.         Thought Content: Thought content normal.         Judgment: Judgment normal.         Assessment:      1. Paroxysmal atrial fibrillation    2. SVT (supraventricular tachycardia)    3. Hypertension, unspecified type    4. Aortic atherosclerosis        Plan:     In summary, Mrs. Willis is a pleasant 79 year-old woman with hypertension and reported SVT presenting for evaluation for newly diagnosed atrial fibrillation. I had a long discussion with the patient about the pathophysiology and risks of atrial fibrillation and its basic pathophysiology, including its health implications and treatment options. Specifically, I addressed the need for CVA (stroke) prophylaxis with aspirin versus oral anticoagulation (warfarin vs DOACs, discussed bleeding risks, and need to come to the ER for any head trauma for CT scanning even if asymptomatic). [id number] score is 4 and oral anticoagulation is indicated. I also discussed the goal to reduce symptomatic arrhythmic episodes by pharmacologic and/or procedural methods and utilizing a rhythm versus a rate control strategy. Suspect "SVT" is a paroxysmal atrial tachycardia. She has failed low dose flecainide. Unclear tolerability of higher dosing from a resting heart rate standpoint. Offered ablation. I spent about a half hour discussing the nature of PVI including transseptal puncture. We discussed risks and benefits at length. Our discussion included, but was not limited to the risk of death, infection, bleeding, stroke, MI, cardiac perforation, embolism, cardiac tamponade, vascular injury, valvular injury, AE fistula, injury to phrenic nerve, pulmonary vein stenosis and other organic injury including the possibility for need for surgery or pacemaker implantation.  Discussed success rates and potential need for " redo-ablations.    Plan  RF-PVI  Carto  Anesthesia  EMY day of, cancel if in sinus rhythm  Hold dabigatran AM of procedure.        Thank you for allowing me to participate in the care of this patient. Please do not hesitate to call me with any questions or concerns.    Blade Post MD, PhD  Cardiac Electrophysiology

## 2024-06-12 ENCOUNTER — TELEPHONE (OUTPATIENT)
Dept: ELECTROPHYSIOLOGY | Facility: CLINIC | Age: 79
End: 2024-06-12
Payer: MEDICARE

## 2024-06-12 NOTE — TELEPHONE ENCOUNTER
Spoke with Patient . Scheduled for PVI ablation procedure on 8/21/2024 with Dr Post. Procedure details reviewed and advised that pre procedure patient instructions will be sent via portal / mail as requested. Advised to call the office for any questions or concerns prior to scheduled procedure. Understanding verbalized.

## 2024-06-12 NOTE — TELEPHONE ENCOUNTER
----- Message from Iris Motley sent at 6/12/2024  3:20 PM CDT -----  Regarding: procedure  Pt is calling to schedule her procedure and cn be reached at [phone].    Thank you

## 2024-06-20 RX ORDER — LEVOTHYROXINE SODIUM 75 UG/1
75 TABLET ORAL
Qty: 90 TABLET | Refills: 3 | Status: SHIPPED | OUTPATIENT
Start: 2024-06-20

## 2024-06-20 NOTE — TELEPHONE ENCOUNTER
No care due was identified.  Health [facility] Embedded Care Due Messages. Reference number: [id number].   6/20/2024 12:41:15 AM CDT

## 2024-06-20 NOTE — TELEPHONE ENCOUNTER
Refill Decision Note   Anette Lazaro  is requesting a refill authorization.  Brief Assessment and Rationale for Refill:  Approve     Medication Therapy Plan:         Comments:     Note composed:7:35 AM 06/20/2024

## 2024-06-25 DIAGNOSIS — Z01.818 PRE-OP TESTING: ICD-10-CM

## 2024-06-25 DIAGNOSIS — Z79.01 CHRONIC ANTICOAGULATION: ICD-10-CM

## 2024-06-25 DIAGNOSIS — I10 PRIMARY HYPERTENSION: ICD-10-CM

## 2024-06-25 DIAGNOSIS — I47.10 SVT (SUPRAVENTRICULAR TACHYCARDIA): ICD-10-CM

## 2024-06-25 DIAGNOSIS — I48.0 PAROXYSMAL ATRIAL FIBRILLATION: Primary | ICD-10-CM

## 2024-07-02 ENCOUNTER — PATIENT MESSAGE (OUTPATIENT)
Dept: PAIN MEDICINE | Facility: CLINIC | Age: 79
End: 2024-07-02
Payer: MEDICARE

## 2024-07-03 ENCOUNTER — OFFICE VISIT (OUTPATIENT)
Dept: PAIN MEDICINE | Facility: CLINIC | Age: 79
End: 2024-07-03
Payer: MEDICARE

## 2024-07-03 VITALS
WEIGHT: 142.06 LBS | DIASTOLIC BLOOD PRESSURE: 69 MMHG | HEART RATE: 65 BPM | SYSTOLIC BLOOD PRESSURE: 134 MMHG | BODY MASS INDEX: 22.83 KG/M2 | HEIGHT: 66 IN

## 2024-07-03 DIAGNOSIS — M51.36 DDD (DEGENERATIVE DISC DISEASE), LUMBAR: ICD-10-CM

## 2024-07-03 DIAGNOSIS — M54.16 LUMBAR RADICULOPATHY: Primary | ICD-10-CM

## 2024-07-03 DIAGNOSIS — G89.4 CHRONIC PAIN SYNDROME: ICD-10-CM

## 2024-07-03 PROCEDURE — 99214 OFFICE O/P EST MOD 30 MIN: CPT | Mod: PBBFAC,PO | Performed by: NURSE PRACTITIONER

## 2024-07-03 PROCEDURE — 99999 PR PBB SHADOW E&M-EST. PATIENT-LVL IV: CPT | Mod: PBBFAC,,, | Performed by: NURSE PRACTITIONER

## 2024-07-09 ENCOUNTER — OFFICE VISIT (OUTPATIENT)
Dept: OPTOMETRY | Facility: CLINIC | Age: 79
End: 2024-07-09
Payer: MEDICARE

## 2024-07-09 DIAGNOSIS — Z98.42 STATUS POST CATARACT EXTRACTION OF BOTH EYES WITH INSERTION OF INTRAOCULAR LENS: Primary | ICD-10-CM

## 2024-07-09 DIAGNOSIS — Z96.1 STATUS POST CATARACT EXTRACTION OF BOTH EYES WITH INSERTION OF INTRAOCULAR LENS: Primary | ICD-10-CM

## 2024-07-09 DIAGNOSIS — Z98.41 STATUS POST CATARACT EXTRACTION OF BOTH EYES WITH INSERTION OF INTRAOCULAR LENS: Primary | ICD-10-CM

## 2024-07-09 PROCEDURE — 99999 PR PBB SHADOW E&M-EST. PATIENT-LVL III: CPT | Mod: PBBFAC,,, | Performed by: OPTOMETRIST

## 2024-07-09 PROCEDURE — 99213 OFFICE O/P EST LOW 20 MIN: CPT | Mod: PBBFAC | Performed by: OPTOMETRIST

## 2024-07-09 PROCEDURE — 99024 POSTOP FOLLOW-UP VISIT: CPT | Mod: POP,,, | Performed by: OPTOMETRIST

## 2024-07-09 NOTE — PROGRESS NOTES
HPI    Pt is here today for post op. Patient denies pain/discomfort. States that   her va is not as clear as it was before sx.  DLS: 6/6/2024 Dr. Rogers  (-)Flashes   (-)Floaters   (-)Diplopia   (-)Headaches   (+)Itching   (-)Tearing  (-)Burning  (-)Dryness   (-)Photophobia  (-)Glare   (-)Blurred VA  Past Eye Sx: Cataract with IOL OU   Eye Meds: (-)   Last edited by Ruth Rosario, OD on 7/9/2024  2:06 PM.            Assessment /Plan     For exam results, see Encounter Report.    Status post cataract extraction of both eyes with insertion of intraocular lens      Educated on today's WNL findings OU. Eyes well healed from cataract surgery OU. Pt OK to use OTC readers.     RTC in 1 year for annual eye exam unless changes noted sooner.

## 2024-07-10 ENCOUNTER — PATIENT MESSAGE (OUTPATIENT)
Dept: ELECTROPHYSIOLOGY | Facility: CLINIC | Age: 79
End: 2024-07-10
Payer: MEDICARE

## 2024-08-07 ENCOUNTER — PATIENT MESSAGE (OUTPATIENT)
Dept: PRIMARY CARE CLINIC | Facility: CLINIC | Age: 79
End: 2024-08-07
Payer: MEDICARE

## 2024-08-08 NOTE — TELEPHONE ENCOUNTER
Pt scheduled for ablation for afib on 8/21. Pt asking if PCP thinks it is necessary as she is concerned that it may be hard on someone her age

## 2024-08-12 RX ORDER — ROSUVASTATIN CALCIUM 10 MG/1
10 TABLET, COATED ORAL
Qty: 90 TABLET | Refills: 1 | Status: SHIPPED | OUTPATIENT
Start: 2024-08-12

## 2024-08-12 NOTE — TELEPHONE ENCOUNTER
Anette iWllis  is requesting a refill authorization.  Brief Assessment and Rationale for Refill:  Approve     Medication Therapy Plan:         Comments:     Note composed:5:49 AM 08/12/2024

## 2024-08-12 NOTE — TELEPHONE ENCOUNTER
No care due was identified.  Health [facility] Embedded Care Due Messages. Reference number: [id number].   8/12/2024 12:50:18 AM CDT

## 2024-08-14 ENCOUNTER — LAB VISIT (OUTPATIENT)
Dept: LAB | Facility: HOSPITAL | Age: 79
End: 2024-08-14
Attending: INTERNAL MEDICINE
Payer: MEDICARE

## 2024-08-14 DIAGNOSIS — I47.10 SVT (SUPRAVENTRICULAR TACHYCARDIA): ICD-10-CM

## 2024-08-14 DIAGNOSIS — Z79.01 CHRONIC ANTICOAGULATION: ICD-10-CM

## 2024-08-14 DIAGNOSIS — I10 PRIMARY HYPERTENSION: ICD-10-CM

## 2024-08-14 DIAGNOSIS — I48.0 PAROXYSMAL ATRIAL FIBRILLATION: ICD-10-CM

## 2024-08-14 DIAGNOSIS — Z01.818 PRE-OP TESTING: ICD-10-CM

## 2024-08-14 LAB
ANION GAP SERPL CALC-SCNC: 9 MMOL/L (ref 8–16)
APTT PPP: 89 SEC (ref 21–32)
BASOPHILS # BLD AUTO: 0.08 K/UL (ref 0–0.2)
BASOPHILS NFR BLD: 1 % (ref 0–1.9)
BUN SERPL-MCNC: 16 MG/DL (ref 8–23)
CALCIUM SERPL-MCNC: 9.9 MG/DL (ref 8.7–10.5)
CHLORIDE SERPL-SCNC: 105 MMOL/L (ref 95–110)
CO2 SERPL-SCNC: 25 MMOL/L (ref 23–29)
CREAT SERPL-MCNC: 1.2 MG/DL (ref 0.5–1.4)
DIFFERENTIAL METHOD BLD: ABNORMAL
EOSINOPHIL # BLD AUTO: 0.3 K/UL (ref 0–0.5)
EOSINOPHIL NFR BLD: 3.6 % (ref 0–8)
ERYTHROCYTE [DISTWIDTH] IN BLOOD BY AUTOMATED COUNT: 15.8 % (ref 11.5–14.5)
EST. GFR  (NO RACE VARIABLE): 46.6 ML/MIN/1.73 M^2
GLUCOSE SERPL-MCNC: 100 MG/DL (ref 70–110)
HCT VFR BLD AUTO: 44.9 % (ref 37–48.5)
HGB BLD-MCNC: 14 G/DL (ref 12–16)
IMM GRANULOCYTES # BLD AUTO: 0.02 K/UL (ref 0–0.04)
IMM GRANULOCYTES NFR BLD AUTO: 0.2 % (ref 0–0.5)
INR PPP: 1.3 (ref 0.8–1.2)
LYMPHOCYTES # BLD AUTO: 2.3 K/UL (ref 1–4.8)
LYMPHOCYTES NFR BLD: 27.1 % (ref 18–48)
MCH RBC QN AUTO: 25.5 PG (ref 27–31)
MCHC RBC AUTO-ENTMCNC: 31.2 G/DL (ref 32–36)
MCV RBC AUTO: 82 FL (ref 82–98)
MONOCYTES # BLD AUTO: 0.8 K/UL (ref 0.3–1)
MONOCYTES NFR BLD: 10 % (ref 4–15)
NEUTROPHILS # BLD AUTO: 4.8 K/UL (ref 1.8–7.7)
NEUTROPHILS NFR BLD: 58.1 % (ref 38–73)
NRBC BLD-RTO: 0 /100 WBC
PLATELET # BLD AUTO: 242 K/UL (ref 150–450)
PMV BLD AUTO: 12.3 FL (ref 9.2–12.9)
POTASSIUM SERPL-SCNC: 4.9 MMOL/L (ref 3.5–5.1)
PROTHROMBIN TIME: 14.5 SEC (ref 9–12.5)
RBC # BLD AUTO: 5.48 M/UL (ref 4–5.4)
SODIUM SERPL-SCNC: 139 MMOL/L (ref 136–145)
WBC # BLD AUTO: 8.29 K/UL (ref 3.9–12.7)

## 2024-08-14 PROCEDURE — 85730 THROMBOPLASTIN TIME PARTIAL: CPT | Performed by: INTERNAL MEDICINE

## 2024-08-14 PROCEDURE — 36415 COLL VENOUS BLD VENIPUNCTURE: CPT | Performed by: INTERNAL MEDICINE

## 2024-08-14 PROCEDURE — 85025 COMPLETE CBC W/AUTO DIFF WBC: CPT | Performed by: INTERNAL MEDICINE

## 2024-08-14 PROCEDURE — 85610 PROTHROMBIN TIME: CPT | Performed by: INTERNAL MEDICINE

## 2024-08-14 PROCEDURE — 80048 BASIC METABOLIC PNL TOTAL CA: CPT | Performed by: INTERNAL MEDICINE

## 2024-08-15 ENCOUNTER — PATIENT MESSAGE (OUTPATIENT)
Dept: ELECTROPHYSIOLOGY | Facility: CLINIC | Age: 79
End: 2024-08-15
Payer: MEDICARE

## 2024-08-16 ENCOUNTER — TELEPHONE (OUTPATIENT)
Dept: ELECTROPHYSIOLOGY | Facility: CLINIC | Age: 79
End: 2024-08-16
Payer: MEDICARE

## 2024-08-16 RX ORDER — LOSARTAN POTASSIUM 100 MG/1
100 TABLET ORAL
Qty: 90 TABLET | Refills: 2 | Status: SHIPPED | OUTPATIENT
Start: 2024-08-16

## 2024-08-16 NOTE — TELEPHONE ENCOUNTER
No care due was identified.  Health [facility] Embedded Care Due Messages. Reference number: [id number].   8/16/2024 12:34:48 AM CDT

## 2024-08-16 NOTE — TELEPHONE ENCOUNTER
Refill Decision Note   Anette Lazaro  is requesting a refill authorization.  Brief Assessment and Rationale for Refill:  Approve     Medication Therapy Plan:         Comments:     Note composed:7:29 AM 08/16/2024

## 2024-08-16 NOTE — TELEPHONE ENCOUNTER
----- Message from Laura Gamboa MA sent at 8/16/2024 12:28 PM CDT -----  Regarding: FW: Procedure    ----- Message -----  From: Radha Stephen  Sent: 8/16/2024  12:20 PM CDT  To: [facility] Staff  Subject: Procedure                                        Patient calling to reschedule her procedure. Please call back @ [phone]. Thank you Radha

## 2024-08-16 NOTE — TELEPHONE ENCOUNTER
Spoke with patient who states that she would like to cancel her procedure scheduled on 8/21/2024, and will want to reschedule her procedure in November . Patient advised that she will be contacted once November scheduled is finalized. Understanding verbalized.

## 2024-08-28 ENCOUNTER — TELEPHONE (OUTPATIENT)
Dept: ELECTROPHYSIOLOGY | Facility: CLINIC | Age: 79
End: 2024-08-28
Payer: MEDICARE

## 2024-08-28 NOTE — TELEPHONE ENCOUNTER
Spoke with patient to offer November dates to reschedule her ablation as requested. Available dates discussed however unfortunately she was unable to accept a date due to dates available conflicted with her 's dialysis. Advised that I will contact her with December availability once confirmed. Understanding verbalized.

## 2024-09-23 ENCOUNTER — PATIENT MESSAGE (OUTPATIENT)
Dept: ELECTROPHYSIOLOGY | Facility: CLINIC | Age: 79
End: 2024-09-23
Payer: MEDICARE

## 2024-09-23 DIAGNOSIS — I48.0 PAROXYSMAL ATRIAL FIBRILLATION: Primary | ICD-10-CM

## 2024-09-23 DIAGNOSIS — Z79.01 CHRONIC ANTICOAGULATION: ICD-10-CM

## 2024-09-23 DIAGNOSIS — I10 PRIMARY HYPERTENSION: ICD-10-CM

## 2024-09-23 DIAGNOSIS — Z01.818 PRE-OP TESTING: ICD-10-CM

## 2024-10-08 ENCOUNTER — OFFICE VISIT (OUTPATIENT)
Dept: PAIN MEDICINE | Facility: CLINIC | Age: 79
End: 2024-10-08
Payer: MEDICARE

## 2024-10-08 VITALS
HEIGHT: 66 IN | HEART RATE: 48 BPM | WEIGHT: 166.13 LBS | DIASTOLIC BLOOD PRESSURE: 60 MMHG | SYSTOLIC BLOOD PRESSURE: 157 MMHG | BODY MASS INDEX: 26.7 KG/M2

## 2024-10-08 DIAGNOSIS — M54.16 LUMBAR RADICULOPATHY: Primary | ICD-10-CM

## 2024-10-08 DIAGNOSIS — G89.4 CHRONIC PAIN SYNDROME: ICD-10-CM

## 2024-10-08 DIAGNOSIS — M51.362 DEGENERATION OF INTERVERTEBRAL DISC OF LUMBAR REGION WITH DISCOGENIC BACK PAIN AND LOWER EXTREMITY PAIN: ICD-10-CM

## 2024-10-08 PROCEDURE — 99999 PR PBB SHADOW E&M-EST. PATIENT-LVL III: CPT | Mod: PBBFAC,,, | Performed by: NURSE PRACTITIONER

## 2024-10-08 PROCEDURE — 99213 OFFICE O/P EST LOW 20 MIN: CPT | Mod: PBBFAC,PO | Performed by: NURSE PRACTITIONER

## 2024-10-08 NOTE — PROGRESS NOTES
[facility] - Established Patient Evaluation    Referred by: No ref. provider found   Reason for referral: * No diagnoses found *     CC:   Chief Complaint   Patient presents with    Leg Pain     Right          7/3/2024     9:39 AM 5/7/2024    10:47 AM 3/21/2024     2:19 PM   Last 3 PDI Scores   Pain Disability Index (PDI) 0 0 38     Interval Update 10/08/2024: Patient return to clinic for three month follow up on right leg pain.  Patient reports that her pain is stable she reports a twinge in her right leg intermittently but not often  Her pain score today is 0/10 she reports continued exercise and no problem with performing her ADLs.  She denies any new pain denies profound weakness denies any bowel or bladder dysfunction at this time.    Interval Update 07/03/2024: Patient return to clinic for follow up. She continues to have relief of her low  back and right leg pain. She was provide a lumbar WAN  targeting L5-S1 in April of 2024  with Dr. Santana that continues to provide her with relief she received 100% relief from this injection denies any new pain denies any profound weakness denies any bowel bladder dysfunction at this time.    Interval history 05/07/2024  Anette Willis presents today for postprocedure follow up.  She is status post L5/S1 interlaminar epidural steroid injection on 04/01/2024 and reports 100% relief of her pain.  She was not currently taking any pain medications.  She reports her pain is 0/10.     Subjective 03/21/24:   Anette Willis is a 79 y.o. female who presents complaining low back pain that radiates down the right lower extremity.  Onset of pain was in November 2023.  No significant falls trauma or history of back surgeries.  Pt reports pain temporarily improved and then worsened again after lifting some heavy boxes.  She has been compliant with physical therapy and does note some improvement with PT.  She tried gabapentin for a few days prescribed back in  November but discontinued it after finding no relief.  An MRI of the lumbar spine was significant for degenerative disc changes as well as a right-sided paracentral disc extrusion causing foraminal effacement at the L4-L5 level.  Severe foraminal stenosis at L4-L5 on the right.    Location: low back  Onset: 3-4 months   Current Pain Score: 6/10  Daily Pain of Range: 7-8/10  Quality: [employer]  Radiation: right leg and right thigh  Worsened by: lifting, standing for more than several minutes, walking for more than several minutes, and daily activity  Improved by: heat and sitting    Patient denies night fever/night sweats, urinary incontinence, bowel incontinence, significant weight loss, significant motor weakness, and loss of sensations.    Previous Interventions:  - 04/01/2024 - L5/S1 WAN - 100% pain relief    Previous Therapies:  PT/OT: yes - some improvement   Chiropractor:   HEP:   Relevant Surgery: no   Previous Medications:   - NSAIDS:  Avoid due to blood thinners.  - Muscle Relaxants:    - TCAs:   - SNRIs:   - Topicals:   - Anticonvulsants:  Gabapentin 300 mg BID  - Opioids:  Tramadol p.r.n.  - Adjuvants:     Current Pain Medications:  None currently     Review of Systems:  ROS    GENERAL:  No weight loss, malaise or fevers.  HEENT:   No recent changes in vision or hearing  NECK:  No difficulty with swallowing. No stridor.   RESPIRATORY:  Negative for cough, wheezing or shortness of breath, patient denies any recent URI.  CARDIOVASCULAR:  Negative for chest pain, leg swelling or palpitations.  GI:  Negative for abdominal discomfort, blood in stools or black stools or change in bowel habits.  MUSCULOSKELETAL:  See HPI.  SKIN:  Negative for lesions, rash, and itching.  PSYCH:  No mood disorder or recent psychosocial stressors.    HEMATOLOGY/LYMPHOLOGY:  Negative for prolonged bleeding, bruising easily or swollen nodes.  Patient is not currently taking any anti-coagulants  NEURO:   No history of  "headaches, syncope, paralysis, seizures or tremors.  All other reviewed and negative other than HPI.    History:  Current medications, allergies, medical history, surgical history,   family history, and social history were reviewed in the chart as marked.    Full Medication List:    Current Outpatient Medications:     dabigatran etexilate (PRADAXA) 150 mg Cap, Take 1 capsule (150 mg total) by mouth 2 (two) times a day. "Do NOT break, chew, or open capsules.", Disp: 60 capsule, Rfl: 11    flecainide (TAMBOCOR) 100 MG Tab, Take 1 tablet (100 mg total) by mouth every 12 (twelve) hours., Disp: 60 tablet, Rfl: 11    levothyroxine (SYNTHROID) 75 MCG tablet, TAKE 1 TABLET BY MOUTH EVERY DAY IN THE MORNING, Disp: 90 tablet, Rfl: 3    losartan (COZAAR) 100 MG tablet, TAKE 1 TABLET BY MOUTH EVERY DAY, Disp: 90 tablet, Rfl: 2    metoprolol succinate (TOPROL-XL) 100 MG 24 hr tablet, Take 1 tablet (100 mg total) by mouth 2 (two) times daily., Disp: 90 tablet, Rfl: 3    rosuvastatin (CRESTOR) 10 MG tablet, TAKE 1 TABLET BY MOUTH EVERY DAY, Disp: 90 tablet, Rfl: 1    traMADoL (ULTRAM) 50 mg tablet, Take 1 tablet (50 mg total) by mouth every 6 (six) hours as needed for Pain. (Patient taking differently: Take 50 mg by mouth every 6 (six) hours as needed for Pain. PRN), Disp: 21 tablet, Rfl: 0  No current facility-administered medications for this visit.    Facility-Administered Medications Ordered in Other Visits:     fentaNYL 50 mcg/mL injection 25 mcg, 25 mcg, Intravenous, Q5 Min PRN, Alley Rogers MD, 25 mcg at 05/08/24 1055    midazolam injection 2 mg, 2 mg, Intravenous, PRN, Alley Rogers MD, 2 mg at 05/08/24 1044    phenylephrine HCL 10% ophthalmic solution 1 drop, 1 drop, Right Eye, PRN, Alley Rogers MD, 1 drop at 05/08/24 1026     Allergies:  Patient has no known allergies.     Medical History:   has a past medical history of Cataract, Epiretinal membrane (ERM) of both eyes, Hypertension, Macular " "degeneration, and Mixed hyperlipidemia.    Surgical History:   has a past surgical history that includes Epidural steroid injection into lumbar spine (N/A, 4/1/2024); Cataract extraction w/  intraocular lens implant (Right, 5/8/2024); and Cataract extraction w/  intraocular lens implant (Left, 6/5/2024).    Family History:  family history is not on file.    Social History:   reports that she has never smoked. She has never been exposed to tobacco smoke. She has never used smokeless tobacco.    Physical Exam:  Vitals:    10/08/24 0950   BP: (!) 157/60   Pulse: (!) 48   Weight: 75.4 kg (166 lb 1.9 oz)   Height: 5' 6" (1.676 m)   PainSc: 0-No pain   PainLoc: Leg         GENERAL: Well appearing, in no acute distress, alert and oriented x3.  PSYCH:  Mood and affect appropriate.  SKIN: Skin color, texture, turgor normal, no rashes or lesions.  HEAD/FACE:  Normocephalic, atraumatic. Cranial nerves grossly intact.  NECK: Normal ROM. Supple.   CV: RRR with palpation of the radial artery.  PULM: No evidence of respiratory difficulty, symmetric chest rise.  GI:  Soft and non-distended.  MSK: Straight leg raising is negative to radicular pain. No pain to palpation over the facet joints of the lumbar spine. No pain with lumbar facet loading. No pain over the SI joints. IZAIAH test is negative. No obvious deformities, edema, or skin discoloration.  No atrophy or tone abnormalities are noted.   NEURO: Bilateral upper and lower extremity coordination and strength is symmetric.  No loss of sensation is noted.  MENTAL STATUS: A x O x 3, good concentration, speech is fluent and goal directed  MOTOR: 5/5 in bilateral lower extremity muscle groups  GAIT: Normal.  Ambulates unassisted.    Imaging:  MRI LUMBAR SPINE WITHOUT CONTRAST     CLINICAL HISTORY:  Lumbar radiculopathy, symptoms persist with conservative treatment; Radiculopathy, lumbar region     TECHNIQUE:  Multiplanar, multisequence MR images were acquired from the thoracolumbar " junction to the sacrum without contrast.     COMPARISON:  None.     FINDINGS:  Alignment: Grade 1 anterolisthesis at L4-L5.     Vertebrae: No fracture or marrow infiltrative process.  Prominent hemangioma seen within the L4 vertebral body.  Focal signal void within the L3 vertebral body in keeping with bone island.     Discs: Fusion across the L5-S1 disc space.  Moderate disc height loss at L3-L4.  No evidence for discitis.     Cord: Conus terminates at L1 and appears unremarkable.  Cauda equina appears unremarkable.     Degenerative findings:     T12-L1: No spinal canal stenosis or neuroforaminal narrowing.     L1-L2: No spinal canal stenosis or neuroforaminal narrowing.     L2-L3: No spinal canal stenosis or neuroforaminal narrowing.     L3-L4: Circumferential disc bulge and mild facet arthropathy.  No spinal canal stenosis or neural foraminal narrowing.     L4-L5: Circumferential disc bulge and severe facet arthropathy noted.  There is a right paracentral/foraminal zone disc extrusion demonstrating 1.7 cm migration cranial to the inferior endplate of L4. these findings result in severe effacement of the right lateral recess and severe right neural foraminal narrowing.  There is also mild central spinal canal stenosis.     L5-S1: Moderate facet arthropathy results in moderate left neural foraminal narrowing.     Paraspinal muscles & soft tissues: Mild paraspinal muscle atrophy.     Impression:     1. Degenerative changes of the lower lumbar spine detailed above.  Right paracentral/foraminal zone disc extrusion at L4-L5 contributes to severe effacement of the right lateral recess and severe right neural foraminal narrowing.        Electronically signed by: Valentín Hoyt MD  Date:                                            03/15/2024    Labs:  BMP  Lab Results   Component Value Date     08/14/2024    K 4.9 08/14/2024     08/14/2024    CO2 25 08/14/2024    BUN 16 08/14/2024    CREATININE 1.2 08/14/2024     CALCIUM 9.9 08/14/2024    ANIONGAP 9 08/14/2024    EGFRNORACEVR 46.6 (A) 08/14/2024     Lab Results   Component Value Date    ALT 26 04/22/2024    AST 28 04/22/2024    ALKPHOS 84 04/22/2024    BILITOT 0.3 04/22/2024     Lab Results   Component Value Date    WBC 8.29 08/14/2024    HGB 14.0 08/14/2024    HCT 44.9 08/14/2024    MCV 82 08/14/2024     08/14/2024         Assessment:  Problem List Items Addressed This Visit    None          03/21/2024- Anette Willis is a 79 y.o. female who  has a past medical history of Cataract, Epiretinal membrane (ERM) of both eyes, Hypertension, Macular degeneration, and Mixed hyperlipidemia.  By history and examination this patient has chronic low back pain with radiculopathy.  The underlying cause is facet arthritis, degenerative disc disease, foraminal stenosis, and central canal stenosis.  Pathology is confirmed by imaging.  MRI lumbar spine was significant for degenerative changes throughout.  There is a right paracentral/foraminal disc extrusion at the L4/5 level which contributes to severe effacement of the right lateral recess causing severe right neural foraminal narrowing.  We discussed the underlying diagnoses and multiple treatment options including non-opioid medications, interventional procedures, physical therapy, home exercise, and core muscle enhancement.  She has been compliant with a physical therapy regimen with some improvement in her pain.  I feel like she would benefit from an L5/S1 interlaminar epidural steroid injection.  She was prescribed gabapentin in November at onset of the pain.  She discontinued this medication after a few days.  I do recommend that she restart it and explained that it works best when taken on a daily basis.  The risks and benefits of each treatment option were discussed and all questions were answered.      05/07/2024 - Anette Willis presents today for postprocedure follow up.  She is status post L5/S1 interlaminar epidural  steroid injection on 04/01/2024 and reports 100% relief of her pain.  I will provide the patient with a home exercise regimen.  She may follow up in 2 months or sooner if needed.      07/03/2024 that 79-year-old female that is known to our clinic she was previously provided a lumbar WAN targeting L5-S1 in April 20, 2024 that continues to provide her with 100% relief of her low back and right leg pain.  Today I am recommending she establish a home exercise plan I will provide her with a home exercise packet that has various exercises using her own body weight to help with lumbar stabilization and muscular strengthening.  See plan agreed upon below.    [birth year]68-mavd-old female with a history of chronic low back pain with radiculopathy in the past she has been provided a lumbar WAN targeting L5-S1 significantly reduce her symptoms.  She denies any new pain denies profound weakness patient is doing well she continues with a home exercise plan she reports intermittent pain but her pain is stable at this point see plan agreed upon below.    Treatment Plan:   Procedures:    None at this time.  May repeat L5/S1 interlaminar epidural steroid injection as needed in the future.  PT/OT/HEP: I have stressed the importance of physical activity and a home exercise plan to help with pain and improve health.   I have provided the patient with   Home exercise packet  for spine conditioning program to incorporate into her home exercise regimen.  Medications:   - She may take OTC Tylenol prn    -  Reviewed and consistent with medication use as prescribed.  Imaging:  MRI lumbar spine was reviewed and discussed with the patient using spine models.  Follow Up: RTC 3 months or sooner if needed     [facility]ieC  [facility]      Disclaimer: This note was partly generated using dictation software which may occasionally result in transcription errors.

## 2024-10-11 DIAGNOSIS — I47.10 SVT (SUPRAVENTRICULAR TACHYCARDIA): ICD-10-CM

## 2024-10-11 DIAGNOSIS — R00.2 PALPITATIONS: ICD-10-CM

## 2024-10-11 RX ORDER — METOPROLOL SUCCINATE 100 MG/1
100 TABLET, EXTENDED RELEASE ORAL 2 TIMES DAILY
Qty: 180 TABLET | Refills: 1 | Status: SHIPPED | OUTPATIENT
Start: 2024-10-11

## 2024-10-11 NOTE — TELEPHONE ENCOUNTER
Refill Routing Note   Medication(s) are not appropriate for processing by [facility] for the following reason(s):        No active prescription written by provider  Responsible provider unclear  Required vitals abnormal    [facility] action(s):  Defer             Appointments  past 12m or future 3m with PCP    Date Provider   Last Visit   4/29/2024 Simon Hernandez MD   Next Visit   Visit date not found Simon Hernandez MD   ED visits in past 90 days: 0        Note composed:7:09 AM 10/11/2024

## 2024-10-11 NOTE — TELEPHONE ENCOUNTER
No care due was identified.  [facility] Embedded Care Due Messages. Reference number: [id number].   10/11/2024 6:37:50 AM CDT

## 2024-10-16 ENCOUNTER — PATIENT MESSAGE (OUTPATIENT)
Dept: ADMINISTRATIVE | Facility: HOSPITAL | Age: 79
End: 2024-10-16
Payer: MEDICARE

## 2024-10-22 ENCOUNTER — PATIENT OUTREACH (OUTPATIENT)
Dept: PRIMARY CARE CLINIC | Facility: CLINIC | Age: 79
End: 2024-10-22

## 2024-11-11 ENCOUNTER — PATIENT MESSAGE (OUTPATIENT)
Dept: ELECTROPHYSIOLOGY | Facility: CLINIC | Age: 79
End: 2024-11-11
Payer: MEDICARE

## 2024-11-20 ENCOUNTER — TELEPHONE (OUTPATIENT)
Dept: ELECTROPHYSIOLOGY | Facility: CLINIC | Age: 79
End: 2024-11-20
Payer: MEDICARE

## 2024-11-22 ENCOUNTER — TELEPHONE (OUTPATIENT)
Dept: ELECTROPHYSIOLOGY | Facility: CLINIC | Age: 79
End: 2024-11-22
Payer: MEDICARE

## 2024-11-26 ENCOUNTER — TELEPHONE (OUTPATIENT)
Dept: ELECTROPHYSIOLOGY | Facility: CLINIC | Age: 79
End: 2024-11-26
Payer: MEDICARE

## 2024-11-26 ENCOUNTER — PATIENT MESSAGE (OUTPATIENT)
Dept: ADMINISTRATIVE | Facility: HOSPITAL | Age: 79
End: 2024-11-26
Payer: MEDICARE

## 2024-11-27 ENCOUNTER — OFFICE VISIT (OUTPATIENT)
Dept: ELECTROPHYSIOLOGY | Facility: CLINIC | Age: 79
End: 2024-11-27
Payer: MEDICARE

## 2024-11-27 VITALS
DIASTOLIC BLOOD PRESSURE: 68 MMHG | HEART RATE: 53 BPM | HEIGHT: 66 IN | BODY MASS INDEX: 26.58 KG/M2 | WEIGHT: 165.38 LBS | SYSTOLIC BLOOD PRESSURE: 130 MMHG

## 2024-11-27 DIAGNOSIS — I10 HYPERTENSION, UNSPECIFIED TYPE: ICD-10-CM

## 2024-11-27 DIAGNOSIS — I70.0 AORTIC ATHEROSCLEROSIS: ICD-10-CM

## 2024-11-27 DIAGNOSIS — I47.10 SVT (SUPRAVENTRICULAR TACHYCARDIA): Primary | ICD-10-CM

## 2024-11-27 DIAGNOSIS — I48.0 PAROXYSMAL ATRIAL FIBRILLATION: ICD-10-CM

## 2024-11-27 LAB
OHS QRS DURATION: 84 MS
OHS QTC CALCULATION: 397 MS

## 2024-11-27 PROCEDURE — 99999 PR PBB SHADOW E&M-EST. PATIENT-LVL III: CPT | Mod: PBBFAC,,, | Performed by: INTERNAL MEDICINE

## 2024-11-27 PROCEDURE — 93010 ELECTROCARDIOGRAM REPORT: CPT | Mod: S$PBB,,, | Performed by: STUDENT IN AN ORGANIZED HEALTH CARE EDUCATION/TRAINING PROGRAM

## 2024-11-27 PROCEDURE — 93005 ELECTROCARDIOGRAM TRACING: CPT | Mod: PBBFAC | Performed by: STUDENT IN AN ORGANIZED HEALTH CARE EDUCATION/TRAINING PROGRAM

## 2024-11-27 PROCEDURE — 99213 OFFICE O/P EST LOW 20 MIN: CPT | Mod: PBBFAC | Performed by: INTERNAL MEDICINE

## 2024-11-27 PROCEDURE — 99214 OFFICE O/P EST MOD 30 MIN: CPT | Mod: S$PBB,,, | Performed by: INTERNAL MEDICINE

## 2024-11-27 NOTE — PROGRESS NOTES
"Subjective:   Patient ID:  Anette Willis is a 79 y.o. female who presents for evaluation of Atrial Fibrillation    Primary Cardiologist: Tavo Damon Jr, MD  Primary Care Physician: Simon Hernandez MD    HPI  Prior Hx:  I had the pleasure of seeing Mrs. Willis today in our electrophysiology clinic in follow-up for her atrial fibrillation. As you are aware she is a pleasant 79 year-old woman with hypertension and SVT. She has a long history of palpitations, previously determined to be SVT. She was treated with metoprolol by her cardiologist when she lived in [place]. She saw Dr. Dubois to establish care after moving to [place]. A 30 day event monitor was ordered. She was observed to have an episode of AF with RVR for which she had been referred and seen in 2/2024. She was started on eliquis. No ECGs from [place] of SVT. Eliquis is very unaffordable. She paid $800 for a 90 day supply. Suspect "SVT" is a paroxysmal atrial tachycardia. Discussed drug therapy versus ablation. She preferred to start with an anti-arrhythmic drug. She has a structurally normal heart and no symptoms of ischemic heart disease. Will start flecainide 100mg bid. Requested she check with her drug insurance company if dabigatran is covered. If not recommend switching to warfarin at the end of her 90 days of eliquis    ECHO 2/2024: normal LVEF, normal LA size    4/2024: Mrs. Willis returned for follow-up. Feels great. Concerned with her resting heart rate in the 50s on her meds. This is asymptomatic. She has not called her insurance yet about Dabigatran coverage. Suspect "SVT" is a paroxysmal atrial tachycardia. Discussed drug therapy versus ablation. She preferred to start AAD therapy. She is on flecainide/metoprolol. This is working well for her. Will lower metoprolol succinate to 100mg once daily. Recommended again to contact her insurance regarding coverage of Dabigatran. Should this be unaffordable would recommend transition to warfarin " with goal INR of 2-3 once she runs out of eliquis.    6/2024: Mrs. Willis returned for follow-up. She has had recurrence of AF with associated weakness. Saw Dr. Damon in May 2024 and was in AF. In sinus rhythm today. After discussion she elected for PVI.    Interim Hx:  Mrs. Willis cancelled her previously scheduled ablation in August. She presents to discuss. She rescheduled for December however presents to discuss again. She indicates that she has been more in normal rhythm since switching to dabigatran. We have discussed what dabigatran does, which has no role in rhythm control. She continues on flecainide/metoprolol. She reports no AF since May 2024.    My interpretation of today's in-clinic ECG is sinus bradycardia with a rate of 53 bpm and narrow QRS    Review of Systems   Constitutional: Negative for fever and malaise/fatigue.   HENT:  Negative for congestion and sore throat.    Eyes:  Negative for blurred vision and visual disturbance.   Cardiovascular:  Negative for chest pain, dyspnea on exertion, irregular heartbeat, near-syncope, palpitations and syncope.   Respiratory:  Negative for cough and shortness of breath.    Hematologic/Lymphatic: Negative for bleeding problem. Does not bruise/bleed easily.   Skin: Negative.    Musculoskeletal:  Positive for arthritis and joint pain.   Gastrointestinal:  Negative for bloating, abdominal pain, hematochezia and melena.   Neurological:  Negative for focal weakness and weakness.   Psychiatric/Behavioral: Negative.         Objective:   Physical Exam  Vitals reviewed.   Constitutional:       General: She is not in acute distress.     Appearance: She is well-developed. She is not diaphoretic.   HENT:      Head: Normocephalic and atraumatic.   Eyes:      General:         Right eye: No discharge.         Left eye: No discharge.      Conjunctiva/sclera: Conjunctivae normal.   Cardiovascular:      Rate and Rhythm: Regular rhythm. Bradycardia present.      Heart sounds:  No murmur heard.     No friction rub. No gallop.   Pulmonary:      Effort: Pulmonary effort is normal. No respiratory distress.      Breath sounds: Normal breath sounds. No wheezing or rales.   Abdominal:      General: Bowel sounds are normal. There is no distension.      Palpations: Abdomen is soft.      Tenderness: There is no abdominal tenderness.   Musculoskeletal:      Cervical back: Neck supple.   Skin:     General: Skin is warm and dry.   Neurological:      Mental Status: She is alert and oriented to person, place, and time.   Psychiatric:         Behavior: Behavior normal.         Thought Content: Thought content normal.         Judgment: Judgment normal.         Assessment:      1. SVT (supraventricular tachycardia)    2. Hypertension, unspecified type    3. Paroxysmal atrial fibrillation    4. Aortic atherosclerosis        Plan:     In summary, Mrs. Willis is a pleasant 79 year-old woman with hypertension and reported SVT presenting for evaluation for symptomatic paroxysmal AF. She is having second thoughts regarding PVI (previously cancelled then re-scheduled). Discussed Pradaxa is not an anti-arrhythmic drug and it is a coincidence that her burden has decreased since changing from eliquis to pradaxa. Discussed flecainide is the anti-arrhythmic drug that is keeping her in sinus rhythm, with which she has already shown to have breakthrough. Discussed PVI versus alternative AAD therapy versus maintaining as is on flecainide. She elects to continue as is for now but would consider PVI again should her burden increase.     RTC in 6 months    Thank you for allowing me to participate in the care of this patient. Please do not hesitate to call me with any questions or concerns.    Blade Post MD, PhD  Cardiac Electrophysiology

## 2024-12-20 VITALS — DIASTOLIC BLOOD PRESSURE: 65 MMHG | SYSTOLIC BLOOD PRESSURE: 124 MMHG

## 2024-12-27 DIAGNOSIS — I48.0 PAROXYSMAL ATRIAL FIBRILLATION: ICD-10-CM

## 2024-12-27 RX ORDER — FLECAINIDE ACETATE 100 MG/1
100 TABLET ORAL EVERY 12 HOURS
Qty: 180 TABLET | Refills: 3 | Status: SHIPPED | OUTPATIENT
Start: 2024-12-27

## 2025-01-07 ENCOUNTER — OFFICE VISIT (OUTPATIENT)
Dept: PAIN MEDICINE | Facility: CLINIC | Age: 80
End: 2025-01-07
Payer: MEDICARE

## 2025-01-07 VITALS
SYSTOLIC BLOOD PRESSURE: 154 MMHG | HEART RATE: 57 BPM | DIASTOLIC BLOOD PRESSURE: 81 MMHG | BODY MASS INDEX: 26.69 KG/M2 | HEIGHT: 66 IN

## 2025-01-07 DIAGNOSIS — M51.362 DEGENERATION OF INTERVERTEBRAL DISC OF LUMBAR REGION WITH DISCOGENIC BACK PAIN AND LOWER EXTREMITY PAIN: ICD-10-CM

## 2025-01-07 DIAGNOSIS — G89.4 CHRONIC PAIN SYNDROME: ICD-10-CM

## 2025-01-07 DIAGNOSIS — M54.16 LUMBAR RADICULOPATHY: Primary | ICD-10-CM

## 2025-01-07 PROCEDURE — 99214 OFFICE O/P EST MOD 30 MIN: CPT | Mod: S$PBB,,, | Performed by: NURSE PRACTITIONER

## 2025-01-07 PROCEDURE — 99999 PR PBB SHADOW E&M-EST. PATIENT-LVL III: CPT | Mod: PBBFAC,,, | Performed by: NURSE PRACTITIONER

## 2025-01-07 PROCEDURE — 99213 OFFICE O/P EST LOW 20 MIN: CPT | Mod: PBBFAC,PO | Performed by: NURSE PRACTITIONER

## 2025-01-07 PROCEDURE — G2211 COMPLEX E/M VISIT ADD ON: HCPCS | Mod: S$PBB,,, | Performed by: NURSE PRACTITIONER

## 2025-01-07 NOTE — PROGRESS NOTES
[facility] - Established Patient Evaluation    Referred by: Dr. Simon Hernandez   Reason for referral: * No diagnoses found *     CC:   Chief Complaint   Patient presents with    Follow-up    Leg Pain     Right          10/8/2024     9:46 AM 7/3/2024     9:39 AM 5/7/2024    10:47 AM   Last 3 PDI Scores   Pain Disability Index (PDI) 0 0 0     Interval Update 01/07/2025: Patient return to clinic for three month follow up on right leg pain. Patients reports that her right leg pain is stable at this point she intermittently (once every few weeks)  has a pain in hte right LE but this is also stable. She reports that she is functioning well since her previous lumbar WAN targeting L5-S1 she just wanted to follow up with our office to make sure we are where that she is doing well at if her pain returns we can consider repeating the lumbar WNA targeting L5-S1.  She denies any bowel bladder dysfunction, denies any saddle anesthesia denies any profound weakness at this time.    Interval Update 10/08/2024: Patient return to clinic for three month follow up on right leg pain.  Patient reports that her pain is stable she reports a twinge in her right leg intermittently but not often  Her pain score today is 0/10 she reports continued exercise and no problem with performing her ADLs.  She denies any new pain denies profound weakness denies any bowel or bladder dysfunction at this time.    Interval Update 07/03/2024: Patient return to clinic for follow up. She continues to have relief of her low  back and right leg pain. She was provide a lumbar WAN  targeting L5-S1 in April of 2024  with Dr. Sanatna that continues to provide her with relief she received 100% relief from this injection denies any new pain denies any profound weakness denies any bowel bladder dysfunction at this time.    Interval history 05/07/2024  Anette Willis presents today for postprocedure follow up.  She is status post L5/S1  interlaminar epidural steroid injection on 04/01/2024 and reports 100% relief of her pain.  She was not currently taking any pain medications.  She reports her pain is 0/10.     Subjective 03/21/24:   Anette Willis is a 79 y.o. female who presents complaining low back pain that radiates down the right lower extremity.  Onset of pain was in November 2023.  No significant falls trauma or history of back surgeries.  Pt reports pain temporarily improved and then worsened again after lifting some heavy boxes.  She has been compliant with physical therapy and does note some improvement with PT.  She tried gabapentin for a few days prescribed back in November but discontinued it after finding no relief.  An MRI of the lumbar spine was significant for degenerative disc changes as well as a right-sided paracentral disc extrusion causing foraminal effacement at the L4-L5 level.  Severe foraminal stenosis at L4-L5 on the right.    Location: low back  Onset: 3-4 months   Current Pain Score: 6/10  Daily Pain of Range: 7-8/10  Quality: [employer]  Radiation: right leg and right thigh  Worsened by: lifting, standing for more than several minutes, walking for more than several minutes, and daily activity  Improved by: heat and sitting    Patient denies night fever/night sweats, urinary incontinence, bowel incontinence, significant weight loss, significant motor weakness, and loss of sensations.    Previous Interventions:  - 04/01/2024 - L5/S1 WAN - 100% pain relief    Previous Therapies:  PT/OT: yes - some improvement   Chiropractor:   HEP:   Relevant Surgery: no   Previous Medications:   - NSAIDS:  Avoid due to blood thinners.  - Muscle Relaxants:    - TCAs:   - SNRIs:   - Topicals:   - Anticonvulsants:  Gabapentin 300 mg BID  - Opioids:  Tramadol p.r.n.  - Adjuvants:     Current Pain Medications:  None currently     Review of Systems:  ROS    GENERAL:  No weight loss, malaise or fevers.  HEENT:   No recent changes in  "vision or hearing  NECK:  No difficulty with swallowing. No stridor.   RESPIRATORY:  Negative for cough, wheezing or shortness of breath, patient denies any recent URI.  CARDIOVASCULAR:  Negative for chest pain, leg swelling or palpitations.  GI:  Negative for abdominal discomfort, blood in stools or black stools or change in bowel habits.  MUSCULOSKELETAL:  See HPI.  SKIN:  Negative for lesions, rash, and itching.  PSYCH:  No mood disorder or recent psychosocial stressors.    HEMATOLOGY/LYMPHOLOGY:  Negative for prolonged bleeding, bruising easily or swollen nodes.  Patient is not currently taking any anti-coagulants  NEURO:   No history of headaches, syncope, paralysis, seizures or tremors.  All other reviewed and negative other than HPI.    History:  Current medications, allergies, medical history, surgical history,   family history, and social history were reviewed in the chart as marked.    Full Medication List:    Current Outpatient Medications:     dabigatran etexilate (PRADAXA) 150 mg Cap, Take 1 capsule (150 mg total) by mouth 2 (two) times a day. "Do NOT break, chew, or open capsules.", Disp: 60 capsule, Rfl: 11    flecainide (TAMBOCOR) 100 MG Tab, TAKE 1 TABLET BY MOUTH EVERY 12 HOURS, Disp: 180 tablet, Rfl: 3    levothyroxine (SYNTHROID) 75 MCG tablet, TAKE 1 TABLET BY MOUTH EVERY DAY IN THE MORNING, Disp: 90 tablet, Rfl: 3    losartan (COZAAR) 100 MG tablet, TAKE 1 TABLET BY MOUTH EVERY DAY, Disp: 90 tablet, Rfl: 2    metoprolol succinate (TOPROL-XL) 100 MG 24 hr tablet, TAKE 1 TABLET BY MOUTH TWICE A DAY, Disp: 180 tablet, Rfl: 1    rosuvastatin (CRESTOR) 10 MG tablet, TAKE 1 TABLET BY MOUTH EVERY DAY, Disp: 90 tablet, Rfl: 0    traMADoL (ULTRAM) 50 mg tablet, Take 1 tablet (50 mg total) by mouth every 6 (six) hours as needed for Pain. (Patient taking differently: Take 50 mg by mouth every 6 (six) hours as needed for Pain. PRN), Disp: 21 tablet, Rfl: 0  No current facility-administered medications for " "this visit.    Facility-Administered Medications Ordered in Other Visits:     fentaNYL 50 mcg/mL injection 25 mcg, 25 mcg, Intravenous, Q5 Min PRN, Alley Rogers MD, 25 mcg at 05/08/24 1055    midazolam injection 2 mg, 2 mg, Intravenous, PRN, Alley Rogers MD, 2 mg at 05/08/24 1044    phenylephrine HCL 10% ophthalmic solution 1 drop, 1 drop, Right Eye, PRN, Alley Rogers MD, 1 drop at 05/08/24 1026     Allergies:  Patient has no known allergies.     Medical History:   has a past medical history of Cataract, Epiretinal membrane (ERM) of both eyes, Hypertension, Macular degeneration, and Mixed hyperlipidemia.    Surgical History:   has a past surgical history that includes Epidural steroid injection into lumbar spine (N/A, 4/1/2024); Cataract extraction w/  intraocular lens implant (Right, 5/8/2024); and Cataract extraction w/  intraocular lens implant (Left, 6/5/2024).    Family History:  family history is not on file.    Social History:   reports that she has never smoked. She has never been exposed to tobacco smoke. She has never used smokeless tobacco.    Physical Exam:  Vitals:    01/07/25 0957   BP: (!) 154/81   Pulse: (!) 57   Height: 5' 6" (1.676 m)   PainSc: 0-No pain   PainLoc: Leg           GENERAL: Well appearing, in no acute distress, alert and oriented x3.  PSYCH:  Mood and affect appropriate.  SKIN: Skin color, texture, turgor normal, no rashes or lesions.  HEAD/FACE:  Normocephalic, atraumatic. Cranial nerves grossly intact.  NECK: Normal ROM. Supple.   CV: RRR with palpation of the radial artery.  PULM: No evidence of respiratory difficulty, symmetric chest rise.  GI:  Soft and non-distended.  MSK: Straight leg raising is negative to radicular pain. No pain to palpation over the facet joints of the lumbar spine. No pain with lumbar facet loading. No pain over the SI joints. IZAIAH test is negative. No obvious deformities, edema, or skin discoloration.  No atrophy or tone abnormalities " are noted.   NEURO: Bilateral upper and lower extremity coordination and strength is symmetric.  No loss of sensation is noted.  MENTAL STATUS: A x O x 3, good concentration, speech is fluent and goal directed  MOTOR: 5/5 in bilateral lower extremity muscle groups  GAIT: Normal.  Ambulates unassisted.    Imaging:  MRI LUMBAR SPINE WITHOUT CONTRAST     CLINICAL HISTORY:  Lumbar radiculopathy, symptoms persist with conservative treatment; Radiculopathy, lumbar region     TECHNIQUE:  Multiplanar, multisequence MR images were acquired from the thoracolumbar junction to the sacrum without contrast.     COMPARISON:  None.     FINDINGS:  Alignment: Grade 1 anterolisthesis at L4-L5.     Vertebrae: No fracture or marrow infiltrative process.  Prominent hemangioma seen within the L4 vertebral body.  Focal signal void within the L3 vertebral body in keeping with bone island.     Discs: Fusion across the L5-S1 disc space.  Moderate disc height loss at L3-L4.  No evidence for discitis.     Cord: Conus terminates at L1 and appears unremarkable.  Cauda equina appears unremarkable.     Degenerative findings:     T12-L1: No spinal canal stenosis or neuroforaminal narrowing.     L1-L2: No spinal canal stenosis or neuroforaminal narrowing.     L2-L3: No spinal canal stenosis or neuroforaminal narrowing.     L3-L4: Circumferential disc bulge and mild facet arthropathy.  No spinal canal stenosis or neural foraminal narrowing.     L4-L5: Circumferential disc bulge and severe facet arthropathy noted.  There is a right paracentral/foraminal zone disc extrusion demonstrating 1.7 cm migration cranial to the inferior endplate of L4. these findings result in severe effacement of the right lateral recess and severe right neural foraminal narrowing.  There is also mild central spinal canal stenosis.     L5-S1: Moderate facet arthropathy results in moderate left neural foraminal narrowing.     Paraspinal muscles & soft tissues: Mild paraspinal  muscle atrophy.     Impression:     1. Degenerative changes of the lower lumbar spine detailed above.  Right paracentral/foraminal zone disc extrusion at L4-L5 contributes to severe effacement of the right lateral recess and severe right neural foraminal narrowing.        Electronically signed by: Valentín Hoyt MD  Date:                                            03/15/2024    Labs:  BMP  Lab Results   Component Value Date     08/14/2024    K 4.9 08/14/2024     08/14/2024    CO2 25 08/14/2024    BUN 16 08/14/2024    CREATININE 1.2 08/14/2024    CALCIUM 9.9 08/14/2024    ANIONGAP 9 08/14/2024    EGFRNORACEVR 46.6 (A) 08/14/2024     Lab Results   Component Value Date    ALT 26 04/22/2024    AST 28 04/22/2024    ALKPHOS 84 04/22/2024    BILITOT 0.3 04/22/2024     Lab Results   Component Value Date    WBC 8.29 08/14/2024    HGB 14.0 08/14/2024    HCT 44.9 08/14/2024    MCV 82 08/14/2024     08/14/2024         Assessment:  Problem List Items Addressed This Visit    None      03/21/2024- Anette Willis is a 79 y.o. female who  has a past medical history of Cataract, Epiretinal membrane (ERM) of both eyes, Hypertension, Macular degeneration, and Mixed hyperlipidemia.  By history and examination this patient has chronic low back pain with radiculopathy.  The underlying cause is facet arthritis, degenerative disc disease, foraminal stenosis, and central canal stenosis.  Pathology is confirmed by imaging.  MRI lumbar spine was significant for degenerative changes throughout.  There is a right paracentral/foraminal disc extrusion at the L4/5 level which contributes to severe effacement of the right lateral recess causing severe right neural foraminal narrowing.  We discussed the underlying diagnoses and multiple treatment options including non-opioid medications, interventional procedures, physical therapy, home exercise, and core muscle enhancement.  She has been compliant with a physical therapy regimen  with some improvement in her pain.  I feel like she would benefit from an L5/S1 interlaminar epidural steroid injection.  She was prescribed gabapentin in November at onset of the pain.  She discontinued this medication after a few days.  I do recommend that she restart it and explained that it works best when taken on a daily basis.  The risks and benefits of each treatment option were discussed and all questions were answered.      05/07/2024 - Anette Willis presents today for postprocedure follow up.  She is status post L5/S1 interlaminar epidural steroid injection on 04/01/2024 and reports 100% relief of her pain.  I will provide the patient with a home exercise regimen.  She may follow up in 2 months or sooner if needed.      07/03/2024 that 79-year-old female that is known to our clinic she was previously provided a lumbar WAN targeting L5-S1 in April 20, 2024 that continues to provide her with 100% relief of her low back and right leg pain.  Today I am recommending she establish a home exercise plan I will provide her with a home exercise packet that has various exercises using her own body weight to help with lumbar stabilization and muscular strengthening.  See plan agreed upon below.    10/08[birth year]0872-27-tpiu-old female with a history of chronic low back pain with radiculopathy in the past she has been provided a lumbar WAN targeting L5-S1 significantly reduce her symptoms.  She denies any new pain denies profound weakness patient is doing well she continues with a home exercise plan she reports intermittent pain but her pain is stable at this point see plan agreed upon below.    01/07/2025-Anette Willis is a 79 y.o. female who  has a past medical history of Cataract, Epiretinal membrane (ERM) of both eyes, Hypertension, Macular degeneration, and Mixed hyperlipidemia.  By history and examination this patient has chronic low back pain with RIGHT radiculopathy.  The underlying cause cause is facet arthritis,  degenerative disc disease, foraminal stenosis, and central canal stenosis.  Pathology is confirmed by imaging.  We discussed the underlying diagnoses and multiple treatment options including non-opioid medications, interventional procedures, physical therapy, home exercise, core muscle enhancement, activity modification, and weight loss.  The risks and benefits of each treatment option were discussed and all questions were answered.     Treatment Plan:   Procedures:    None at this time.  May repeat L5/S1 interlaminar epidural steroid injection as needed in the future.  PT/OT/HEP: I have stressed the importance of physical activity and a home exercise plan to help with pain and improve health.   I have provided the patient with   Home exercise packet  for spine conditioning program to incorporate into her home exercise regimen.  Medications:   - Patient s/c'd  OTC Tylenol prn    -  Reviewed and consistent with medication use as prescribed.  Imaging:  MRI lumbar spine was reviewed and discussed with the patient using spine models.  Follow Up: RTC 4 months or sooner if needed     Jose Owens NP-C  [facility]      Disclaimer: This note was partly generated using dictation software which may occasionally result in transcription errors.

## 2025-02-14 ENCOUNTER — OFFICE VISIT (OUTPATIENT)
Dept: PRIMARY CARE CLINIC | Facility: CLINIC | Age: 80
End: 2025-02-14
Payer: MEDICARE

## 2025-02-14 ENCOUNTER — NURSE TRIAGE (OUTPATIENT)
Dept: ADMINISTRATIVE | Facility: CLINIC | Age: 80
End: 2025-02-14
Payer: MEDICARE

## 2025-02-14 VITALS
DIASTOLIC BLOOD PRESSURE: 82 MMHG | SYSTOLIC BLOOD PRESSURE: 130 MMHG | RESPIRATION RATE: 14 BRPM | HEART RATE: 63 BPM | OXYGEN SATURATION: 99 % | WEIGHT: 164.88 LBS | BODY MASS INDEX: 26.5 KG/M2 | HEIGHT: 66 IN

## 2025-02-14 DIAGNOSIS — J06.9 UPPER RESPIRATORY TRACT INFECTION, UNSPECIFIED TYPE: Primary | ICD-10-CM

## 2025-02-14 DIAGNOSIS — R06.2 WHEEZING: ICD-10-CM

## 2025-02-14 LAB
CTP QC/QA: YES
CTP QC/QA: YES
POC MOLECULAR INFLUENZA A AGN: NEGATIVE
POC MOLECULAR INFLUENZA B AGN: NEGATIVE
SARS-COV-2 RDRP RESP QL NAA+PROBE: NEGATIVE

## 2025-02-14 PROCEDURE — 99214 OFFICE O/P EST MOD 30 MIN: CPT | Mod: PBBFAC,PN

## 2025-02-14 PROCEDURE — 99999 PR PBB SHADOW E&M-EST. PATIENT-LVL IV: CPT | Mod: PBBFAC,,,

## 2025-02-14 RX ORDER — LEVOCETIRIZINE DIHYDROCHLORIDE 5 MG/1
2.5 TABLET, FILM COATED ORAL NIGHTLY
Qty: 5 TABLET | Refills: 0 | Status: SHIPPED | OUTPATIENT
Start: 2025-02-14 | End: 2025-02-14 | Stop reason: CLARIF

## 2025-02-14 RX ORDER — GUAIFENESIN 600 MG/1
1200 TABLET, EXTENDED RELEASE ORAL 2 TIMES DAILY
Qty: 40 TABLET | Refills: 0 | Status: SHIPPED | OUTPATIENT
Start: 2025-02-14 | End: 2025-02-24

## 2025-02-14 RX ORDER — ALBUTEROL SULFATE 90 UG/1
2 INHALANT RESPIRATORY (INHALATION) EVERY 6 HOURS PRN
Qty: 8 G | Refills: 0 | Status: SHIPPED | OUTPATIENT
Start: 2025-02-14

## 2025-02-14 RX ORDER — PSYLLIUM HUSK 0.4 G
600 CAPSULE ORAL ONCE
COMMUNITY

## 2025-02-14 RX ORDER — CETIRIZINE HYDROCHLORIDE 5 MG/1
5 TABLET, CHEWABLE ORAL DAILY
Qty: 30 TABLET | Refills: 0 | Status: SHIPPED | OUTPATIENT
Start: 2025-02-14 | End: 2025-03-16

## 2025-02-14 NOTE — PROGRESS NOTES
[facility] Progress Note  2/14/2025          Reason for Visit:      had concerns including Cough and Wheezing (Coughing up mucus).       History of Present Illness:         Health Maintenance Due   Topic Date Due    Pneumococcal Vaccines (Age 50+) (2 of 2 - PPSV23) 12/08/2021     History of Present Illness    CHIEF COMPLAINT:  Patient presents today with symptoms of a common cold.    UPPER RESPIRATORY SYMPTOMS:  She presents with episodes of 4-5 consecutive sneezes, significant nasal congestion with mucus production, and asymmetric nasal breathing. She reports a productive cough with mucus. She has developed nighttime wheezing associated with current cold symptoms, with no prior history of asthma or wheezing with previous upper respiratory infections. She denies fever, chills, sore throat, chest tightness, chest pain, and shortness of breath. Her sleep is disrupted by wheezing and frequent bathroom visits.    PAST MEDICAL HISTORY:  She has a history of nasal problems during pregnancy approximately 50 years ago, which led to an Afrin addiction requiring prolonged period for discontinuation.             Assessment & Plan      IMPRESSION:  - Assessed patient with common cold symptoms and new onset wheezing  - Considered possible pneumonia due to wheezing, ordered chest XR to rule out  - Determined steroid treatment unnecessary based on normal lung sounds  - Opted for albuterol treatment to address wheezing symptoms  - Initiated allergy medication to manage nasal congestion  - Awaiting swab results to determine need for antiviral treatment    URI  WHEEZING  -99% RA , afebrile  - Patient reports having a common cold with symptoms including rhinorrhea, sneezing, and mucus production.  - Explained that wheezing can occur with viral illnesses, causing chest tightness.  - Discussed potential for mucus buildup causing nocturnal wheezing when supine.  - FLU/COVID swabs negative  - Ordered a chest XR to rule  "out pneumonia.  - Prescribed albuterol inhaler, to be used every 6 hours as needed for wheezing and dyspnea.   - Continue supportive care: hydrate, rest, mucinex BID, zyrtec daily, flonase/nasal saline PRN, warm salt water gargles and honey and tea for sore throat  Tylenol PRN fever, HA, body aches  - follow up CXR  - RTO if sx persist or worsen               Problem List:     Patient Active Problem List   Diagnosis    Hypothyroidism    HTN (hypertension)    Dyslipidemia    SVT (supraventricular tachycardia)    Impaired functional mobility, balance, gait, and endurance    DDD (degenerative disc disease), lumbar    Spondylosis of lumbar region without myelopathy or radiculopathy    Lumbar strain, initial encounter    Senile purpura    Paroxysmal atrial fibrillation    Nonexudative age-related macular degeneration, bilateral, intermediate dry stage    Posterior vitreous detachment, bilateral    Epiretinal membrane, right    Vitelliform lesion of macula    NS (nuclear sclerosis), bilateral    Nuclear sclerotic cataract of right eye    Aortic atherosclerosis         Medications:       Current Outpatient Medications:     calcium carbonate (CALCIUM 600 ORAL), Take by mouth., Disp: , Rfl:     calcium carbonate (OS-DUSTIN) 600 mg calcium (1,500 mg) Tab, Take 600 mg by mouth once., Disp: , Rfl:     dabigatran etexilate (PRADAXA) 150 mg Cap, Take 1 capsule (150 mg total) by mouth 2 (two) times a day. "Do NOT break, chew, or open capsules.", Disp: 60 capsule, Rfl: 11    flecainide (TAMBOCOR) 100 MG Tab, TAKE 1 TABLET BY MOUTH EVERY 12 HOURS, Disp: 180 tablet, Rfl: 3    Lactobacillus rhamnosus GG (CULTURELLE) 10 billion cell capsule, Take 1 capsule by mouth once daily., Disp: , Rfl:     levothyroxine (SYNTHROID) 75 MCG tablet, TAKE 1 TABLET BY MOUTH EVERY DAY IN THE MORNING, Disp: 90 tablet, Rfl: 3    losartan (COZAAR) 100 MG tablet, TAKE 1 TABLET BY MOUTH EVERY DAY, Disp: 90 tablet, Rfl: 2    metoprolol succinate (TOPROL-XL) 100 " MG 24 hr tablet, TAKE 1 TABLET BY MOUTH TWICE A DAY, Disp: 180 tablet, Rfl: 1    rosuvastatin (CRESTOR) 10 MG tablet, TAKE 1 TABLET BY MOUTH EVERY DAY, Disp: 90 tablet, Rfl: 0    vit C/E/Zn/coppr/lutein/zeaxan (PRESERVISION AREDS-2 ORAL), Take by mouth., Disp: , Rfl:     albuterol (VENTOLIN HFA) 90 mcg/actuation inhaler, Inhale 2 puffs into the lungs every 6 (six) hours as needed for Wheezing or Shortness of Breath. Rescue, Disp: 8 g, Rfl: 0    cetirizine (ZYRTEC) 5 MG chewable tablet, Take 1 tablet (5 mg total) by mouth once daily., Disp: 30 tablet, Rfl: 0    guaiFENesin (MUCINEX) 600 mg 12 hr tablet, Take 2 tablets (1,200 mg total) by mouth 2 (two) times daily. for 10 days, Disp: 40 tablet, Rfl: 0    traMADoL (ULTRAM) 50 mg tablet, Take 1 tablet (50 mg total) by mouth every 6 (six) hours as needed for Pain. (Patient not taking: Reported on 2/14/2025), Disp: 21 tablet, Rfl: 0  No current facility-administered medications for this visit.    Facility-Administered Medications Ordered in Other Visits:     fentaNYL 50 mcg/mL injection 25 mcg, 25 mcg, Intravenous, Q5 Min PRN, Alley Rogers MD, 25 mcg at 05/08/24 1055    midazolam injection 2 mg, 2 mg, Intravenous, PRN, Alley Rogers MD, 2 mg at 05/08/24 1044    phenylephrine HCL 10% ophthalmic solution 1 drop, 1 drop, Right Eye, PRN, Alley Rogers MD, 1 drop at 05/08/24 1026        Review of Systems:     Review of Systems   Constitutional:  Negative for appetite change, chills and fever.   HENT:  Positive for nasal congestion and sneezing. Negative for postnasal drip, sinus pressure/congestion and sore throat.    Respiratory:  Positive for cough (productive) and wheezing. Negative for chest tightness and shortness of breath.    Cardiovascular:  Negative for chest pain.   Gastrointestinal:  Negative for constipation, diarrhea, nausea and vomiting.   Musculoskeletal:  Negative for myalgias.   Neurological:  Negative for headaches.   Psychiatric/Behavioral:   "Negative for sleep disturbance.            Physical Exam:     Temp:             Blood Pressure:  130/82        Pulse:   63     Respirations:  14  Weight:   74.8 kg (164 lb 14.5 oz)  Height:   5' 6" (1.676 m)  BMI:     Body mass index is 26.62 kg/m².    Physical Exam  Constitutional:       General: She is not in acute distress.     Appearance: Normal appearance. She is ill-appearing. She is not toxic-appearing or diaphoretic.   HENT:      Right Ear: Tympanic membrane, ear canal and external ear normal. There is no impacted cerumen.      Left Ear: Tympanic membrane, ear canal and external ear normal. There is no impacted cerumen.      Nose: Congestion and rhinorrhea present.      Mouth/Throat:      Mouth: Mucous membranes are moist.      Pharynx: Oropharynx is clear. Posterior oropharyngeal erythema present. No oropharyngeal exudate.   Cardiovascular:      Rate and Rhythm: Normal rate and regular rhythm.      Pulses: Normal pulses.      Heart sounds: Normal heart sounds.   Pulmonary:      Effort: Pulmonary effort is normal. No respiratory distress.      Breath sounds: Normal breath sounds. No wheezing.   Chest:      Chest wall: No tenderness.   Neurological:      General: No focal deficit present.      Mental Status: She is alert and oriented to person, place, and time. Mental status is at baseline.      Cranial Nerves: No cranial nerve deficit.      Motor: No weakness.      Gait: Gait normal.   Psychiatric:         Mood and Affect: Mood normal.         Behavior: Behavior normal.                 Labs/Imaging/Testing:     Lab Results   Component Value Date    WBC 8.29 08/14/2024    HGB 14.0 08/14/2024    HCT 44.9 08/14/2024    MCV 82 08/14/2024     08/14/2024        CMP  Sodium   Date Value Ref Range Status   08/14/2024 139 136 - 145 mmol/L Final     Potassium   Date Value Ref Range Status   08/14/2024 4.9 3.5 - 5.1 mmol/L Final     Chloride   Date Value Ref Range Status   08/14/2024 105 95 - 110 mmol/L Final "     CO2   Date Value Ref Range Status   08/14/2024 25 23 - 29 mmol/L Final     Glucose   Date Value Ref Range Status   08/14/2024 100 70 - 110 mg/dL Final     BUN   Date Value Ref Range Status   08/14/2024 16 8 - 23 mg/dL Final     Creatinine   Date Value Ref Range Status   08/14/2024 1.2 0.5 - 1.4 mg/dL Final     Calcium   Date Value Ref Range Status   08/14/2024 9.9 8.7 - 10.5 mg/dL Final     Total Protein   Date Value Ref Range Status   04/22/2024 6.5 6.0 - 8.4 g/dL Final     Albumin   Date Value Ref Range Status   04/22/2024 3.6 3.5 - 5.2 g/dL Final     Total Bilirubin   Date Value Ref Range Status   04/22/2024 0.3 0.1 - 1.0 mg/dL Final     Comment:     For infants and newborns, interpretation of results should be based  on gestational age, weight and in agreement with clinical  observations.    Premature Infant recommended reference ranges:  Up to 24 hours.............<8.0 mg/dL  Up to 48 hours............<12.0 mg/dL  3-5 days..................<15.0 mg/dL  6-29 days.................<15.0 mg/dL       Alkaline Phosphatase   Date Value Ref Range Status   04/22/2024 84 55 - 135 U/L Final     AST   Date Value Ref Range Status   04/22/2024 28 10 - 40 U/L Final     ALT   Date Value Ref Range Status   04/22/2024 26 10 - 44 U/L Final     Anion Gap   Date Value Ref Range Status   08/14/2024 9 8 - 16 mmol/L Final     eGFR   Date Value Ref Range Status   08/14/2024 46.6 (A) >60 mL/min/1.73 m^2 Final       Lab Results   Component Value Date    TSH 1.321 03/05/2024       Lab Results   Component Value Date    HGBA1C 6.2 (H) 03/05/2024       Lab Results   Component Value Date    CHOL 133 03/05/2024    CHOL 148 03/10/2023     Lab Results   Component Value Date    HDL 36 (L) 03/05/2024    HDL 48 03/10/2023     Lab Results   Component Value Date    LDLCALC 73.8 03/05/2024    LDLCALC 82.4 03/10/2023     Lab Results   Component Value Date    TRIG 116 03/05/2024    TRIG 88 03/10/2023       Lab Results   Component Value Date     CHOLHDL 27.1 03/05/2024    CHOLHDL 32.4 03/10/2023               Assessment and Plan:         1. Upper respiratory tract infection, unspecified type  -     POCT Influenza A/B Molecular  -     POCT COVID-19 Rapid Screening  -     Discontinue: levocetirizine (XYZAL) 5 MG tablet; Take 0.5 tablets (2.5 mg total) by mouth every evening. for 10 days  Dispense: 5 tablet; Refill: 0  -     guaiFENesin (MUCINEX) 600 mg 12 hr tablet; Take 2 tablets (1,200 mg total) by mouth 2 (two) times daily. for 10 days  Dispense: 40 tablet; Refill: 0  -     cetirizine (ZYRTEC) 5 MG chewable tablet; Take 1 tablet (5 mg total) by mouth once daily.  Dispense: 30 tablet; Refill: 0    2. Wheezing  -     X-Ray Chest PA And Lateral; Future; Expected date: 02/14/2025  -     albuterol (VENTOLIN HFA) 90 mcg/actuation inhaler; Inhale 2 puffs into the lungs every 6 (six) hours as needed for Wheezing or Shortness of Breath. Rescue  Dispense: 8 g; Refill: 0           Follow up if symptoms worsen or fail to improve.     Zehra Ibrahim MD  [facility]   2/14/2025    This note was generated with the assistance of ambient listening technology. Verbal consent was obtained by the patient and accompanying visitor(s) for the recording of patient appointment to facilitate this note. I attest to having reviewed and edited the generated note for accuracy, though some syntax or spelling errors may persist. Please contact the author of this note for any clarification.       Thank you for the opportunity to care for you. We strive to always provide excellent care. Please complete a survey if you receive one and let us know how we are doing    This note was prepared using voice-recognition software.  Although efforts are made to proofread the note, some errors may persist in the final document.

## 2025-02-18 ENCOUNTER — RESULTS FOLLOW-UP (OUTPATIENT)
Dept: PRIMARY CARE CLINIC | Facility: CLINIC | Age: 80
End: 2025-02-18

## 2025-02-18 ENCOUNTER — HOSPITAL ENCOUNTER (OUTPATIENT)
Dept: RADIOLOGY | Facility: HOSPITAL | Age: 80
Discharge: HOME | End: 2025-02-18
Payer: MEDICARE

## 2025-02-18 DIAGNOSIS — R06.2 WHEEZING: ICD-10-CM

## 2025-02-18 PROCEDURE — 71046 X-RAY EXAM CHEST 2 VIEWS: CPT | Mod: 26,,, | Performed by: RADIOLOGY

## 2025-02-18 PROCEDURE — 71046 X-RAY EXAM CHEST 2 VIEWS: CPT | Mod: TC,PO

## 2025-02-24 DIAGNOSIS — Z00.00 ENCOUNTER FOR MEDICARE ANNUAL WELLNESS EXAM: ICD-10-CM

## 2025-04-04 DIAGNOSIS — R00.2 PALPITATIONS: ICD-10-CM

## 2025-04-04 DIAGNOSIS — I47.10 SVT (SUPRAVENTRICULAR TACHYCARDIA): ICD-10-CM

## 2025-04-04 RX ORDER — METOPROLOL SUCCINATE 100 MG/1
100 TABLET, EXTENDED RELEASE ORAL 2 TIMES DAILY
Qty: 180 TABLET | Refills: 0 | Status: SHIPPED | OUTPATIENT
Start: 2025-04-04

## 2025-04-04 NOTE — TELEPHONE ENCOUNTER
Care Due:                  Date            Visit Type   Department     Provider  --------------------------------------------------------------------------------                                [facility] PRIMARY  Last Visit: 04-      FOLLOW UP    CARE           Simon Hernandez  Next Visit: None Scheduled  None         None Found                                                            Last  Test          Frequency    Reason                     Performed    Due Date  --------------------------------------------------------------------------------    CMP.........  12 months..  rosuvastatin.............  04- 04-    Lipid Panel.  12 months..  rosuvastatin.............  03- 03-    TSH.........  12 months..  levothyroxine............  03- 03-    [employer] Embedded Care Due Messages. Reference number: [id number].   4/04/2025 9:11:08 AM CDT

## 2025-04-04 NOTE — TELEPHONE ENCOUNTER
Provider Staff:  Action required for this patient    Requires labs      Please see care gap opportunities below in Care Due Message.    Thanks!  [facility]     Appointments      Date Provider   Last Visit   4/29/2024 Simon Hernandez MD   Next Visit   Visit date not found Simon Hernandez MD     Refill Decision Note   Anette Willis  is requesting a refill authorization.  Brief Assessment and Rationale for Refill:  Approve     Medication Therapy Plan:        Comments:     Note composed:1:23 PM 04/04/2025

## 2025-04-14 RX ORDER — DABIGATRAN ETEXILATE 150 MG/1
150 CAPSULE ORAL 2 TIMES DAILY
Qty: 180 CAPSULE | Refills: 3 | Status: SHIPPED | OUTPATIENT
Start: 2025-04-14

## 2025-04-22 PROBLEM — N18.31 CHRONIC KIDNEY DISEASE, STAGE 3A: Status: ACTIVE | Noted: 2025-04-22

## 2025-04-22 NOTE — PROGRESS NOTES
[facility] Progress Note  4/24/2025          Reason for Visit:      had concerns including Annual Exam.       History of Present Illness:     Patient presents today for prescription refills and follow-up    Poor memory  She reports declining ability to recall past events and specific details when prompted by others, though denies impact on daily activities. 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 She engages in daily mental exercises including Sudoku and Wordle puzzles for cognitive stimulation.  Discussed doing MMSE but she declined today     ASVT  Previously diagnosed.  On metoprolol 100 bid and has extra 25 mg tablets that she takes if she has episodes, which isn't often.  Has always resolved within just a few minutes with 1 or 2 doses of the 25 mg tablet.  She saw Dr. Post in EP and an ablation had previously been discussed but she preferred to just monitor since not having symptoms.     Hypothyroidism  Levothyroxine 75 mcg daily  Diagnosed on routine labs.  Has been on stable dose for a while  Agreeable to recheck labs in march- orders placed     Senile purpura  Has scattered bruising on both forearms.    No significant bleeding.  On aspirin 81 mg daily previously - stopped, but advised with the known atherosclerosis, probably should restart.        Problem List:     Problem List[1]      Surgical History:     She has a past surgical history that includes Epidural steroid injection into lumbar spine (N/A, 4/1/2024); Cataract extraction w/  intraocular lens implant (Right, 5/8/2024); and Cataract extraction w/  intraocular lens implant (Left, 6/5/2024).      Family History:      Her family history is not on file.      Social History:     She reports that she has never smoked. She has never been exposed to tobacco smoke. She has never used smokeless tobacco.      Medications:     Current  "Medications[2]      Allergies:   She has no known allergies.      Review of Systems:     Review of Systems   Constitutional:  Negative for activity change and unexpected weight change.   HENT:  Negative for hearing loss, rhinorrhea and trouble swallowing.    Eyes:  Negative for discharge and visual disturbance.   Respiratory:  Negative for chest tightness and wheezing.    Cardiovascular:  Negative for chest pain and palpitations.   Gastrointestinal:  Negative for blood in stool, constipation, diarrhea and vomiting.   Endocrine: Negative for polydipsia and polyuria.   Genitourinary:  Negative for difficulty urinating, dysuria, hematuria and menstrual problem.   Musculoskeletal:  Positive for joint swelling. Negative for arthralgias and neck pain.   Neurological:  Negative for weakness and headaches.   Psychiatric/Behavioral:  Negative for confusion and dysphoric mood.          Physical Exam:     Temp:             Blood Pressure:  136/80        Pulse:   62     Respirations:     Weight:   74.7 kg (164 lb 10.9 oz)  Height:   5' 6" (1.676 m)  BMI:     Body mass index is 26.58 kg/m².    Physical Exam  Constitutional:       General: She is not in acute distress.  HENT:      Right Ear: Tympanic membrane normal.      Left Ear: Tympanic membrane normal.      Nose: No congestion or rhinorrhea.      Mouth/Throat:      Pharynx: No oropharyngeal exudate or posterior oropharyngeal erythema.   Cardiovascular:      Rate and Rhythm: Normal rate and regular rhythm.   Pulmonary:      Effort: Pulmonary effort is normal. No respiratory distress.      Breath sounds: No wheezing.   Abdominal:      Palpations: Abdomen is soft.      Tenderness: There is no abdominal tenderness.   Skin:     General: Skin is warm.   Neurological:      General: No focal deficit present.      Mental Status: She is alert and oriented to person, place, and time.       Labs/Imaging/Testing:     Most recent CBC:  Lab Results   Component Value Date    WBC 8.29 " 08/14/2024    HGB 14.0 08/14/2024     08/14/2024    MCV 82 08/14/2024       Most recent Lipids:  Lab Results   Component Value Date    CHOL 133 03/05/2024    HDL 36 (L) 03/05/2024    LDLCALC 73.8 03/05/2024    TRIG 116 03/05/2024       Most recent Chemistry:  Lab Results   Component Value Date     08/14/2024    K 4.9 08/14/2024     08/14/2024    CO2 25 08/14/2024    BUN 16 08/14/2024    CREATININE 1.2 08/14/2024     08/14/2024    CALCIUM 9.9 08/14/2024    ALT 26 04/22/2024    AST 28 04/22/2024    ALKPHOS 84 04/22/2024    PROT 6.5 04/22/2024    ALBUMIN 3.6 04/22/2024       Other tests:  Lab Results   Component Value Date    TSH 1.321 03/05/2024    FREET4 1.46 03/05/2024    INR 1.3 (H) 08/14/2024    HGBA1C 6.2 (H) 03/05/2024    [id number] 45 03/05/2024         Assessment and Plan:     Visit Summary:  Assessed memory concerns, determining low suspicion for dementia based on history and ability to perform daily activities. Explained that difficulty recalling specific past events is generally less concerning than forgetting daily tasks or navigation.  Considered cognitive testing but patient declined at this time.  Recommend continuing baby aspirin due to evidence of aortic plaque on imaging. Discussed the shift in recommendations for routine baby aspirin use, noting its continued benefit for those with known plaque.  Evaluated need for fish oil supplementation, deemed unnecessary due to normal triglyceride levels. Clarified the primary use of fish oil supplements for lowering triglycerides.  Discussed potential follow-up with cardiologist Dr. Post for AFib management, despite being asymptomatic on current metoprolol regimen.  Reviewed back pain history, noting long-lasting relief from previous injection.  Assessed calcium supplementation, recommending continuation of Caltrate plus D3 for postmenopausal bone health.    - Continue Caltrate plus D3 for postmenopausal bone health  - Start baby  aspirin for antiplatelet therapy  - Continue metoprolol 100 mg twice daily for atrial fibrillation  - Discontinue fish oil supplementation  - Ordered routine lab work: thyroid levels, chemistry panel, and lipid panel  - Referred to Dr. Post (cardiologist) for annual follow-up  - Follow up for fasting morning appointment for lab work  - Contact office to schedule appointment with Dr. Post, avoiding Mondays and Fridays  - Follow up only if new symptoms develop or memory concerns persist/worsen         1. SVT (supraventricular tachycardia)  - metoprolol succinate (TOPROL-XL) 100 MG 24 hr tablet; Take 1 tablet (100 mg total) by mouth 2 (two) times daily.  Dispense: 180 tablet; Refill: 3  - Ambulatory referral/consult to Cardiology; Future    - Continue metoprolol 100 mg twice daily for atrial fibrillation management.  - Referred the patient to Dr. Post (cardiologist) for annual follow-up.  - Instructed the patient to contact the office for assistance in scheduling an appointment with Dr. Post, avoiding Mondays and Fridays.  - Noted that the patient is not experiencing symptoms of atrial fibrillation, and the Apple Watch has not detected any episodes.  - Recorded the patient's current heart rate at 60 bpm.  - Assessed that the patient is asymptomatic and feels fine.  - Recommend annual follow-up with cardiologist to evaluate the need for additional testing, despite the patient being asymptomatic.    2. Paroxysmal atrial fibrillation  - flecainide (TAMBOCOR) 100 MG Tab; Take 1 tablet (100 mg total) by mouth 2 (two) times a day.  Dispense: 180 tablet; Refill: 3  - CBC Auto Differential; Future  - Comprehensive Metabolic Panel; Future  - Lipid Panel; Future  - Hemoglobin A1C; Future  - TSH; Future  - T4, Free; Future  - Vitamin D; Future    3. Palpitations  - metoprolol succinate (TOPROL-XL) 100 MG 24 hr tablet; Take 1 tablet (100 mg total) by mouth 2 (two) times daily.  Dispense: 180 tablet; Refill: 3  - Ambulatory  referral/consult to Cardiology; Future    4. Hypothyroidism, unspecified type  Recheck thyroid function    5. Aortic atherosclerosis  - Continue baby aspirin for antiplatelet therapy.  - Noted evidence of plaque in the aorta on imaging.  - Recommend baby aspirin for antiplatelet therapy due to evidence of plaque in the aorta.    6. Vitamin D deficiency  - Vitamin D; Future    7. Memory loss  - Noted that the patient reports difficulty recalling past events and names, but no issues with daily activities or recent memory.  - Assessed that the patient's memory issues are likely not indicative of dementia based on history and presentation.  - Recommend cognitive testing and potential neurological evaluation if memory concerns persist or worsen.  - Encouraged the patient to continue engaging in cognitive activities like Sudoku and Wordle to challenge memory.    8. Senile purpura  Stable     - Continue Caltrate plus D3 (calcium with vitamin D3) for postmenopausal bone health.    - Discontinue fish oil supplementation.  - Ordered routine lab work including thyroid levels, chemistry panel, and lipid panel, to be scheduled for a fasting morning appointment.  - Follow up for fasting morning appointment for lab work.  - Contact the office after lab results are reviewed if any concerns or changes needed.              Simon Hernandez MD  4/24/2025    This note was prepared using voice-recognition software.  Although efforts are made to proofread the note, some errors may persist in the final document.         [1]   Patient Active Problem List  Diagnosis    Hypothyroidism    HTN (hypertension)    Dyslipidemia    SVT (supraventricular tachycardia)    Impaired functional mobility, balance, gait, and endurance    DDD (degenerative disc disease), lumbar    Spondylosis of lumbar region without myelopathy or radiculopathy    Lumbar strain, initial encounter    Senile purpura    Paroxysmal atrial fibrillation    Nonexudative  "age-related macular degeneration, bilateral, intermediate dry stage    Posterior vitreous detachment, bilateral    Epiretinal membrane, right    Vitelliform lesion of macula    NS (nuclear sclerosis), bilateral    Nuclear sclerotic cataract of right eye    Aortic atherosclerosis    Upper respiratory tract infection    Wheezing   [2]   Current Outpatient Medications:     omega 3-dha-epa-fish oil (FISH OIL) 1,200 (144-216) mg Cap, Take by mouth once daily., Disp: , Rfl:     vit C/E/Zn/coppr/lutein/zeaxan (PRESERVISION AREDS-2 ORAL), Take by mouth 2 (two) times a day., Disp: , Rfl:     calcium carbonate-vitamin D3 (CALTRATE 600 PLUS D) 600 mg-20 mcg (800 unit) Chew, Take 1 tablet by mouth once daily., Disp: 30 tablet, Rfl: 11    dabigatran etexilate (PRADAXA) 150 mg Cap, Take 1 capsule (150 mg total) by mouth 2 (two) times a day. "Do NOT break, chew, or open capsules.", Disp: 180 capsule, Rfl: 3    flecainide (TAMBOCOR) 100 MG Tab, Take 1 tablet (100 mg total) by mouth 2 (two) times a day., Disp: 180 tablet, Rfl: 3    Lactobacillus rhamnosus GG (CULTURELLE) 10 billion cell capsule, Take 1 capsule by mouth once daily., Disp: 30 capsule, Rfl: 3    levothyroxine (SYNTHROID) 75 MCG tablet, Take 1 tablet (75 mcg total) by mouth once daily., Disp: 90 tablet, Rfl: 3    losartan (COZAAR) 100 MG tablet, Take 1 tablet (100 mg total) by mouth once daily., Disp: 90 tablet, Rfl: 3    metoprolol succinate (TOPROL-XL) 100 MG 24 hr tablet, Take 1 tablet (100 mg total) by mouth 2 (two) times daily., Disp: 180 tablet, Rfl: 3    rosuvastatin (CRESTOR) 10 MG tablet, Take 1 tablet (10 mg total) by mouth once daily., Disp: 90 tablet, Rfl: 3  No current facility-administered medications for this visit.    Facility-Administered Medications Ordered in Other Visits:     fentaNYL 50 mcg/mL injection 25 mcg, 25 mcg, Intravenous, Q5 Min PRN, Alley Rogers MD, 25 mcg at 05/08/24 1055    midazolam injection 2 mg, 2 mg, Intravenous, PRN, " Alley Rogers MD, 2 mg at 05/08/24 1044    phenylephrine HCL 10% ophthalmic solution 1 drop, 1 drop, Right Eye, PRN, Alley Rogers MD, 1 drop at 05/08/24 1026

## 2025-04-24 ENCOUNTER — OFFICE VISIT (OUTPATIENT)
Dept: PRIMARY CARE CLINIC | Facility: CLINIC | Age: 80
End: 2025-04-24
Payer: MEDICARE

## 2025-04-24 VITALS
BODY MASS INDEX: 26.47 KG/M2 | SYSTOLIC BLOOD PRESSURE: 136 MMHG | HEART RATE: 62 BPM | OXYGEN SATURATION: 97 % | DIASTOLIC BLOOD PRESSURE: 80 MMHG | HEIGHT: 66 IN | WEIGHT: 164.69 LBS

## 2025-04-24 DIAGNOSIS — R73.01 IFG (IMPAIRED FASTING GLUCOSE): ICD-10-CM

## 2025-04-24 DIAGNOSIS — E03.9 HYPOTHYROIDISM, UNSPECIFIED TYPE: ICD-10-CM

## 2025-04-24 DIAGNOSIS — I48.0 PAROXYSMAL ATRIAL FIBRILLATION: ICD-10-CM

## 2025-04-24 DIAGNOSIS — I47.10 SVT (SUPRAVENTRICULAR TACHYCARDIA): Primary | ICD-10-CM

## 2025-04-24 DIAGNOSIS — R41.3 MEMORY LOSS: ICD-10-CM

## 2025-04-24 DIAGNOSIS — R00.2 PALPITATIONS: ICD-10-CM

## 2025-04-24 DIAGNOSIS — E55.9 VITAMIN D DEFICIENCY: ICD-10-CM

## 2025-04-24 DIAGNOSIS — D69.2 SENILE PURPURA: ICD-10-CM

## 2025-04-24 DIAGNOSIS — I70.0 AORTIC ATHEROSCLEROSIS: ICD-10-CM

## 2025-04-24 PROBLEM — N18.31 CHRONIC KIDNEY DISEASE, STAGE 3A: Status: RESOLVED | Noted: 2025-04-22 | Resolved: 2025-04-24

## 2025-04-24 PROCEDURE — 99213 OFFICE O/P EST LOW 20 MIN: CPT | Mod: PBBFAC,PN | Performed by: INTERNAL MEDICINE

## 2025-04-24 PROCEDURE — 99214 OFFICE O/P EST MOD 30 MIN: CPT | Mod: S$PBB,,, | Performed by: INTERNAL MEDICINE

## 2025-04-24 PROCEDURE — 99999 PR PBB SHADOW E&M-EST. PATIENT-LVL III: CPT | Mod: PBBFAC,,, | Performed by: INTERNAL MEDICINE

## 2025-04-24 RX ORDER — NAPROXEN SODIUM 220 MG/1
TABLET ORAL DAILY
COMMUNITY

## 2025-04-24 RX ORDER — ROSUVASTATIN CALCIUM 10 MG/1
10 TABLET, COATED ORAL DAILY
Qty: 90 TABLET | Refills: 3 | Status: SHIPPED | OUTPATIENT
Start: 2025-04-24

## 2025-04-24 RX ORDER — LEVOTHYROXINE SODIUM 75 UG/1
75 TABLET ORAL DAILY
Qty: 90 TABLET | Refills: 3 | Status: SHIPPED | OUTPATIENT
Start: 2025-04-24

## 2025-04-24 RX ORDER — DABIGATRAN ETEXILATE 150 MG/1
150 CAPSULE ORAL 2 TIMES DAILY
Qty: 180 CAPSULE | Refills: 3 | Status: SHIPPED | OUTPATIENT
Start: 2025-04-24

## 2025-04-24 RX ORDER — METOPROLOL SUCCINATE 100 MG/1
100 TABLET, EXTENDED RELEASE ORAL 2 TIMES DAILY
Qty: 180 TABLET | Refills: 3 | Status: SHIPPED | OUTPATIENT
Start: 2025-04-24

## 2025-04-24 RX ORDER — NAPROXEN SODIUM 220 MG/1
1 TABLET ORAL DAILY
Qty: 30 CAPSULE | Refills: 3 | Status: CANCELLED | OUTPATIENT
Start: 2025-04-24

## 2025-04-24 RX ORDER — FLECAINIDE ACETATE 100 MG/1
100 TABLET ORAL 2 TIMES DAILY
Qty: 180 TABLET | Refills: 3 | Status: SHIPPED | OUTPATIENT
Start: 2025-04-24

## 2025-04-24 RX ORDER — LOSARTAN POTASSIUM 100 MG/1
100 TABLET ORAL DAILY
Qty: 90 TABLET | Refills: 3 | Status: SHIPPED | OUTPATIENT
Start: 2025-04-24

## 2025-04-24 RX ORDER — VIT C/E/ZN/COPPR/LUTEIN/ZEAXAN 250MG-90MG
1 CAPSULE ORAL 2 TIMES DAILY
Qty: 30 CAPSULE | Refills: 3 | Status: CANCELLED | OUTPATIENT
Start: 2025-04-24

## 2025-04-24 RX ORDER — CALCIUM CARBONATE/VITAMIN D3 600 MG-20
1 TABLET,CHEWABLE ORAL DAILY
Qty: 30 TABLET | Refills: 11 | Status: SHIPPED | OUTPATIENT
Start: 2025-04-24

## 2025-04-28 ENCOUNTER — LAB VISIT (OUTPATIENT)
Dept: LAB | Facility: HOSPITAL | Age: 80
End: 2025-04-28
Attending: INTERNAL MEDICINE
Payer: MEDICARE

## 2025-04-28 ENCOUNTER — RESULTS FOLLOW-UP (OUTPATIENT)
Dept: PRIMARY CARE CLINIC | Facility: CLINIC | Age: 80
End: 2025-04-28

## 2025-04-28 DIAGNOSIS — R73.01 IFG (IMPAIRED FASTING GLUCOSE): ICD-10-CM

## 2025-04-28 DIAGNOSIS — E55.9 VITAMIN D DEFICIENCY: ICD-10-CM

## 2025-04-28 DIAGNOSIS — I48.0 PAROXYSMAL ATRIAL FIBRILLATION: ICD-10-CM

## 2025-04-28 LAB
25(OH)D3+25(OH)D2 SERPL-MCNC: 42 NG/ML (ref 30–96)
ABSOLUTE EOSINOPHIL (OHS): 0.29 K/UL
ABSOLUTE MONOCYTE (OHS): 0.73 K/UL (ref 0.3–1)
ABSOLUTE NEUTROPHIL COUNT (OHS): 4.2 K/UL (ref 1.8–7.7)
ALBUMIN SERPL BCP-MCNC: 3.7 G/DL (ref 3.5–5.2)
ALP SERPL-CCNC: 90 UNIT/L (ref 40–150)
ALT SERPL W/O P-5'-P-CCNC: 16 UNIT/L (ref 10–44)
ANION GAP (OHS): 7 MMOL/L (ref 8–16)
AST SERPL-CCNC: 22 UNIT/L (ref 11–45)
BASOPHILS # BLD AUTO: 0.06 K/UL
BASOPHILS NFR BLD AUTO: 0.8 %
BILIRUB SERPL-MCNC: 0.4 MG/DL (ref 0.1–1)
BUN SERPL-MCNC: 18 MG/DL (ref 8–23)
CALCIUM SERPL-MCNC: 9.4 MG/DL (ref 8.7–10.5)
CHLORIDE SERPL-SCNC: 109 MMOL/L (ref 95–110)
CHOLEST SERPL-MCNC: 142 MG/DL (ref 120–199)
CHOLEST/HDLC SERPL: 3.6 {RATIO} (ref 2–5)
CO2 SERPL-SCNC: 24 MMOL/L (ref 23–29)
CREAT SERPL-MCNC: 1 MG/DL (ref 0.5–1.4)
EAG (OHS): 128 MG/DL (ref 68–131)
ERYTHROCYTE [DISTWIDTH] IN BLOOD BY AUTOMATED COUNT: 14.7 % (ref 11.5–14.5)
GFR SERPLBLD CREATININE-BSD FMLA CKD-EPI: 58 ML/MIN/1.73/M2
GLUCOSE SERPL-MCNC: 108 MG/DL (ref 70–110)
HBA1C MFR BLD: 6.1 % (ref 4–5.6)
HCT VFR BLD AUTO: 48.5 % (ref 37–48.5)
HDLC SERPL-MCNC: 40 MG/DL (ref 40–75)
HDLC SERPL: 28.2 % (ref 20–50)
HGB BLD-MCNC: 15.3 GM/DL (ref 12–16)
IMM GRANULOCYTES # BLD AUTO: 0.02 K/UL (ref 0–0.04)
IMM GRANULOCYTES NFR BLD AUTO: 0.3 % (ref 0–0.5)
LDLC SERPL CALC-MCNC: 85.6 MG/DL (ref 63–159)
LYMPHOCYTES # BLD AUTO: 2.28 K/UL (ref 1–4.8)
MCH RBC QN AUTO: 26.2 PG (ref 27–31)
MCHC RBC AUTO-ENTMCNC: 31.5 G/DL (ref 32–36)
MCV RBC AUTO: 83 FL (ref 82–98)
NONHDLC SERPL-MCNC: 102 MG/DL
NUCLEATED RBC (/100WBC) (OHS): 0 /100 WBC
PLATELET # BLD AUTO: 252 K/UL (ref 150–450)
PMV BLD AUTO: 12.5 FL (ref 9.2–12.9)
POTASSIUM SERPL-SCNC: 4.7 MMOL/L (ref 3.5–5.1)
PROT SERPL-MCNC: 7 GM/DL (ref 6–8.4)
RBC # BLD AUTO: 5.83 M/UL (ref 4–5.4)
RELATIVE EOSINOPHIL (OHS): 3.8 %
RELATIVE LYMPHOCYTE (OHS): 30.1 % (ref 18–48)
RELATIVE MONOCYTE (OHS): 9.6 % (ref 4–15)
RELATIVE NEUTROPHIL (OHS): 55.4 % (ref 38–73)
SODIUM SERPL-SCNC: 140 MMOL/L (ref 136–145)
T4 FREE SERPL-MCNC: 1.39 NG/DL (ref 0.71–1.51)
TRIGL SERPL-MCNC: 82 MG/DL (ref 30–150)
TSH SERPL-ACNC: 1.8 UIU/ML (ref 0.4–4)
WBC # BLD AUTO: 7.58 K/UL (ref 3.9–12.7)

## 2025-04-28 PROCEDURE — 84443 ASSAY THYROID STIM HORMONE: CPT

## 2025-04-28 PROCEDURE — 82947 ASSAY GLUCOSE BLOOD QUANT: CPT

## 2025-04-28 PROCEDURE — 80061 LIPID PANEL: CPT

## 2025-04-28 PROCEDURE — 82306 VITAMIN D 25 HYDROXY: CPT

## 2025-04-28 PROCEDURE — 36415 COLL VENOUS BLD VENIPUNCTURE: CPT | Mod: PN

## 2025-04-28 PROCEDURE — 84439 ASSAY OF FREE THYROXINE: CPT

## 2025-04-28 PROCEDURE — 85025 COMPLETE CBC W/AUTO DIFF WBC: CPT

## 2025-04-28 PROCEDURE — 83036 HEMOGLOBIN GLYCOSYLATED A1C: CPT

## 2025-05-14 ENCOUNTER — OFFICE VISIT (OUTPATIENT)
Dept: PAIN MEDICINE | Facility: CLINIC | Age: 80
End: 2025-05-14
Payer: MEDICARE

## 2025-05-14 VITALS
HEIGHT: 66 IN | HEART RATE: 54 BPM | DIASTOLIC BLOOD PRESSURE: 74 MMHG | BODY MASS INDEX: 26.7 KG/M2 | SYSTOLIC BLOOD PRESSURE: 133 MMHG | WEIGHT: 166.13 LBS

## 2025-05-14 DIAGNOSIS — G89.4 CHRONIC PAIN SYNDROME: ICD-10-CM

## 2025-05-14 DIAGNOSIS — M54.16 LUMBAR RADICULOPATHY: Primary | ICD-10-CM

## 2025-05-14 DIAGNOSIS — M51.362 DEGENERATION OF INTERVERTEBRAL DISC OF LUMBAR REGION WITH DISCOGENIC BACK PAIN AND LOWER EXTREMITY PAIN: ICD-10-CM

## 2025-05-14 PROCEDURE — 99999 PR PBB SHADOW E&M-EST. PATIENT-LVL III: CPT | Mod: PBBFAC,,, | Performed by: NURSE PRACTITIONER

## 2025-05-14 PROCEDURE — 99213 OFFICE O/P EST LOW 20 MIN: CPT | Mod: PBBFAC,PO | Performed by: NURSE PRACTITIONER

## 2025-05-14 PROCEDURE — 99214 OFFICE O/P EST MOD 30 MIN: CPT | Mod: S$PBB,,, | Performed by: NURSE PRACTITIONER

## 2025-05-14 PROCEDURE — G2211 COMPLEX E/M VISIT ADD ON: HCPCS | Mod: S$PBB,,, | Performed by: NURSE PRACTITIONER

## 2025-05-14 NOTE — PROGRESS NOTES
[facility] - Established Patient Evaluation    Referred by: Dr. Simon Hernandez   Reason for referral: * No diagnoses found *     CC:   Chief Complaint   Patient presents with    Follow-up         5/14/2025    10:01 AM 1/7/2025     9:58 AM 10/8/2024     9:46 AM   Last 3 PDI Scores   Pain Disability Index (PDI) 0 0 0     Interval update 05/14/25:  Patient returns to clinic for a 4month follow-up she reports no pain today 0/10 pain score.  She has a hx of low back pain that radiates down the right lower extremity this was a simple four-month follow-up she wanted to discuss her pain level and established a yearly follow up with our office for possible repeat injections if pain returned.  Today she denies any profound weakness denies bowel bladder denies saddle anesthesia at this time.        Interval Update 01/07/2025: Patient return to clinic for three month follow up on right leg pain. Patients reports that her right leg pain is stable at this point she intermittently (once every few weeks)  has a pain in hte right LE but this is also stable. She reports that she is functioning well since her previous lumbar WAN targeting L5-S1 she just wanted to follow up with our office to make sure we are where that she is doing well at if her pain returns we can consider repeating the lumbar WAN targeting L5-S1.  She denies any bowel bladder dysfunction, denies any saddle anesthesia denies any profound weakness at this time.    Interval Update 10/08/2024: Patient return to clinic for three month follow up on right leg pain.  Patient reports that her pain is stable she reports a twinge in her right leg intermittently but not often  Her pain score today is 0/10 she reports continued exercise and no problem with performing her ADLs.  She denies any new pain denies profound weakness denies any bowel or bladder dysfunction at this time.    Interval Update 07/03/2024: Patient return to clinic for follow up.  She continues to have relief of her low  back and right leg pain. She was provide a lumbar WAN  targeting L5-S1 in April of 2024  with Dr. Santana that continues to provide her with relief she received 100% relief from this injection denies any new pain denies any profound weakness denies any bowel bladder dysfunction at this time.    Interval history 05/07/2024  Anette Willis presents today for postprocedure follow up.  She is status post L5/S1 interlaminar epidural steroid injection on 04/01/2024 and reports 100% relief of her pain.  She was not currently taking any pain medications.  She reports her pain is 0/10.     Subjective 03/21/24:   Anette Willis is a 80 y.o. female who presents complaining low back pain that radiates down the right lower extremity.  Onset of pain was in November 2023.  No significant falls trauma or history of back surgeries.  Pt reports pain temporarily improved and then worsened again after lifting some heavy boxes.  She has been compliant with physical therapy and does note some improvement with PT.  She tried gabapentin for a few days prescribed back in November but discontinued it after finding no relief.  An MRI of the lumbar spine was significant for degenerative disc changes as well as a right-sided paracentral disc extrusion causing foraminal effacement at the L4-L5 level.  Severe foraminal stenosis at L4-L5 on the right.    Location: low back  Onset: 3-4 months   Current Pain Score: 6/10  Daily Pain of Range: 7-8/10  Quality: [employer]  Radiation: right leg and right thigh  Worsened by: lifting, standing for more than several minutes, walking for more than several minutes, and daily activity  Improved by: heat and sitting    Patient denies night fever/night sweats, urinary incontinence, bowel incontinence, significant weight loss, significant motor weakness, and loss of sensations.    Previous Interventions:  - 04/01/2024 - L5/S1 WAN - 100% pain relief    Previous  "Therapies:  PT/OT: yes - some improvement   Chiropractor:   HEP:   Relevant Surgery: no   Previous Medications:   - NSAIDS:  Avoid due to blood thinners.  - Muscle Relaxants:    - TCAs:   - SNRIs:   - Topicals:   - Anticonvulsants:  Gabapentin 300 mg BID  - Opioids:  Tramadol p.r.n.  - Adjuvants:     Current Pain Medications:  None currently     Review of Systems:  ROS    GENERAL:  No weight loss, malaise or fevers.  HEENT:   No recent changes in vision or hearing  NECK:  No difficulty with swallowing. No stridor.   RESPIRATORY:  Negative for cough, wheezing or shortness of breath, patient denies any recent URI.  CARDIOVASCULAR:  Negative for chest pain, leg swelling or palpitations.  GI:  Negative for abdominal discomfort, blood in stools or black stools or change in bowel habits.  MUSCULOSKELETAL:  See HPI.  SKIN:  Negative for lesions, rash, and itching.  PSYCH:  No mood disorder or recent psychosocial stressors.    HEMATOLOGY/LYMPHOLOGY:  Negative for prolonged bleeding, bruising easily or swollen nodes.  Patient is not currently taking any anti-coagulants  NEURO:   No history of headaches, syncope, paralysis, seizures or tremors.  All other reviewed and negative other than HPI.    History:  Current medications, allergies, medical history, surgical history,   family history, and social history were reviewed in the chart as marked.    Full Medication List:    Current Outpatient Medications:     calcium carbonate-vitamin D3 (CALTRATE 600 PLUS D) 600 mg-20 mcg (800 unit) Chew, Take 1 tablet by mouth once daily., Disp: 30 tablet, Rfl: 11    dabigatran etexilate (PRADAXA) 150 mg Cap, Take 1 capsule (150 mg total) by mouth 2 (two) times a day. "Do NOT break, chew, or open capsules.", Disp: 180 capsule, Rfl: 3    flecainide (TAMBOCOR) 100 MG Tab, Take 1 tablet (100 mg total) by mouth 2 (two) times a day., Disp: 180 tablet, Rfl: 3    Lactobacillus rhamnosus GG (CULTURELLE) 10 billion cell capsule, Take 1 capsule by " mouth once daily., Disp: 30 capsule, Rfl: 3    levothyroxine (SYNTHROID) 75 MCG tablet, Take 1 tablet (75 mcg total) by mouth once daily., Disp: 90 tablet, Rfl: 3    losartan (COZAAR) 100 MG tablet, Take 1 tablet (100 mg total) by mouth once daily., Disp: 90 tablet, Rfl: 3    metoprolol succinate (TOPROL-XL) 100 MG 24 hr tablet, Take 1 tablet (100 mg total) by mouth 2 (two) times daily., Disp: 180 tablet, Rfl: 3    omega 3-dha-epa-fish oil (FISH OIL) 1,200 (144-216) mg Cap, Take by mouth once daily., Disp: , Rfl:     rosuvastatin (CRESTOR) 10 MG tablet, Take 1 tablet (10 mg total) by mouth once daily., Disp: 90 tablet, Rfl: 3    vit C/E/Zn/coppr/lutein/zeaxan (PRESERVISION AREDS-2 ORAL), Take by mouth 2 (two) times a day., Disp: , Rfl:   No current facility-administered medications for this visit.    Facility-Administered Medications Ordered in Other Visits:     fentaNYL 50 mcg/mL injection 25 mcg, 25 mcg, Intravenous, Q5 Min PRN, Alley Rogers MD, 25 mcg at 05/08/24 1055    midazolam injection 2 mg, 2 mg, Intravenous, PRN, Alley Rogers MD, 2 mg at 05/08/24 1044    phenylephrine HCL 10% ophthalmic solution 1 drop, 1 drop, Right Eye, PRN, Alley Rogers MD, 1 drop at 05/08/24 1026     Allergies:  Patient has no known allergies.     Medical History:   has a past medical history of Cataract, Epiretinal membrane (ERM) of both eyes, Hypertension, Macular degeneration, and Mixed hyperlipidemia.    Surgical History:   has a past surgical history that includes Epidural steroid injection into lumbar spine (N/A, 4/1/2024); Cataract extraction w/  intraocular lens implant (Right, 5/8/2024); and Cataract extraction w/  intraocular lens implant (Left, 6/5/2024).    Family History:  family history is not on file.    Social History:   reports that she has never smoked. She has never been exposed to tobacco smoke. She has never used smokeless tobacco.    Physical Exam:  Vitals:    05/14/25 1020   BP: 133/74   Pulse:  "(!) 54   Weight: 75.4 kg (166 lb 1.9 oz)   Height: 5' 6" (1.676 m)   PainSc: 0-No pain   PainLoc: Back           GENERAL: Well appearing, in no acute distress, alert and oriented x3.  PSYCH:  Mood and affect appropriate.  SKIN: Skin color, texture, turgor normal, no rashes or lesions.  HEAD/FACE:  Normocephalic, atraumatic. Cranial nerves grossly intact.  NECK: Normal ROM. Supple.   CV: RRR with palpation of the radial artery.  PULM: No evidence of respiratory difficulty, symmetric chest rise.  GI:  Soft and non-distended.  MSK: Straight leg raising is negative to radicular pain. No pain to palpation over the facet joints of the lumbar spine. No pain with lumbar facet loading. No pain over the SI joints. IZAIAH test is negative. No obvious deformities, edema, or skin discoloration.  No atrophy or tone abnormalities are noted.   NEURO: Bilateral upper and lower extremity coordination and strength is symmetric.  No loss of sensation is noted.  MENTAL STATUS: A x O x 3, good concentration, speech is fluent and goal directed  MOTOR: 5/5 in bilateral lower extremity muscle groups  GAIT: Normal.  Ambulates unassisted.    Imaging:  MRI LUMBAR SPINE WITHOUT CONTRAST     CLINICAL HISTORY:  Lumbar radiculopathy, symptoms persist with conservative treatment; Radiculopathy, lumbar region     TECHNIQUE:  Multiplanar, multisequence MR images were acquired from the thoracolumbar junction to the sacrum without contrast.     COMPARISON:  None.     FINDINGS:  Alignment: Grade 1 anterolisthesis at L4-L5.     Vertebrae: No fracture or marrow infiltrative process.  Prominent hemangioma seen within the L4 vertebral body.  Focal signal void within the L3 vertebral body in keeping with bone island.     Discs: Fusion across the L5-S1 disc space.  Moderate disc height loss at L3-L4.  No evidence for discitis.     Cord: Conus terminates at L1 and appears unremarkable.  Cauda equina appears unremarkable.     Degenerative findings:     T12-L1: " No spinal canal stenosis or neuroforaminal narrowing.     L1-L2: No spinal canal stenosis or neuroforaminal narrowing.     L2-L3: No spinal canal stenosis or neuroforaminal narrowing.     L3-L4: Circumferential disc bulge and mild facet arthropathy.  No spinal canal stenosis or neural foraminal narrowing.     L4-L5: Circumferential disc bulge and severe facet arthropathy noted.  There is a right paracentral/foraminal zone disc extrusion demonstrating 1.7 cm migration cranial to the inferior endplate of L4. these findings result in severe effacement of the right lateral recess and severe right neural foraminal narrowing.  There is also mild central spinal canal stenosis.     L5-S1: Moderate facet arthropathy results in moderate left neural foraminal narrowing.     Paraspinal muscles & soft tissues: Mild paraspinal muscle atrophy.     Impression:     1. Degenerative changes of the lower lumbar spine detailed above.  Right paracentral/foraminal zone disc extrusion at L4-L5 contributes to severe effacement of the right lateral recess and severe right neural foraminal narrowing.        Electronically signed by: Valentín Hoyt MD  Date:                                            03/15/2024    Labs:  BMP  Lab Results   Component Value Date     04/28/2025    K 4.7 04/28/2025     04/28/2025    CO2 24 04/28/2025    BUN 18 04/28/2025    CREATININE 1.0 04/28/2025    CALCIUM 9.4 04/28/2025    ANIONGAP 7 (L) 04/28/2025    EGFRNORACEVR 58 (L) 04/28/2025     Lab Results   Component Value Date    ALT 16 04/28/2025    AST 22 04/28/2025    ALKPHOS 90 04/28/2025    BILITOT 0.4 04/28/2025     Lab Results   Component Value Date    WBC 7.58 04/28/2025    HGB 15.3 04/28/2025    HCT 48.5 04/28/2025    MCV 83 04/28/2025     04/28/2025         Assessment:  Problem List Items Addressed This Visit    None      03/21/2024- Anette Willis is a 80 y.o. female who  has a past medical history of Cataract, Epiretinal membrane (ERM)  of both eyes, Hypertension, Macular degeneration, and Mixed hyperlipidemia.  By history and examination this patient has chronic low back pain with radiculopathy.  The underlying cause is facet arthritis, degenerative disc disease, foraminal stenosis, and central canal stenosis.  Pathology is confirmed by imaging.  MRI lumbar spine was significant for degenerative changes throughout.  There is a right paracentral/foraminal disc extrusion at the L4/5 level which contributes to severe effacement of the right lateral recess causing severe right neural foraminal narrowing.  We discussed the underlying diagnoses and multiple treatment options including non-opioid medications, interventional procedures, physical therapy, home exercise, and core muscle enhancement.  She has been compliant with a physical therapy regimen with some improvement in her pain.  I feel like she would benefit from an L5/S1 interlaminar epidural steroid injection.  She was prescribed gabapentin in November at onset of the pain.  She discontinued this medication after a few days.  I do recommend that she restart it and explained that it works best when taken on a daily basis.  The risks and benefits of each treatment option were discussed and all questions were answered.      05/07/2024 - Anette Willis presents today for postprocedure follow up.  She is status post L5/S1 interlaminar epidural steroid injection on 04/01/2024 and reports 100% relief of her pain.  I will provide the patient with a home exercise regimen.  She may follow up in 2 months or sooner if needed.      07/03/2024 that 79-year-old female that is known to our clinic she was previously provided a lumbar WAN targeting L5-S1 in April 20, 2024 that continues to provide her with 100% relief of her low back and right leg pain.  Today I am recommending she establish a home exercise plan I will provide her with a home exercise packet that has various exercises using her own body weight to  help with lumbar stabilization and muscular strengthening.  See plan agreed upon below.    10[birth year]08[birth year]10-89-xvmy-old female with a history of chronic low back pain with radiculopathy in the past she has been provided a lumbar WAN targeting L5-S1 significantly reduce her symptoms.  She denies any new pain denies profound weakness patient is doing well she continues with a home exercise plan she reports intermittent pain but her pain is stable at this point see plan agreed upon below.    01/07/2025-Anette Willis is a 80 y.o. female who  has a past medical history of Cataract, Epiretinal membrane (ERM) of both eyes, Hypertension, Macular degeneration, and Mixed hyperlipidemia.  By history and examination this patient has chronic low back pain with RIGHT radiculopathy.  The underlying cause cause is facet arthritis, degenerative disc disease, foraminal stenosis, and central canal stenosis.  Pathology is confirmed by imaging.  We discussed the underlying diagnoses and multiple treatment options including non-opioid medications, interventional procedures, physical therapy, home exercise, core muscle enhancement, activity modification, and weight loss.  The risks and benefits of each treatment option were discussed and all questions were answered.     05/14/[id number]-dthm-old pleasant female by history and exam patient has chronic low back pain with right radiculopathy.  In the past she has been provided a lumbar WAN targeting L5-S1 that significant reduce her symptoms.  Her and I discussed the underlying diagnosis facet arthritis, degenerative disc disease, foraminal stenosis and central canal stenosis for now her pain is stable her pain score today was 0/10 we can certainly provide her a repeat injection if pain returns.    Treatment Plan:   Procedures:    None at this time.  May repeat L5/S1 interlaminar epidural steroid injection as needed in the future.   PT/OT/HEP: I have stressed the importance of physical activity and a  home exercise plan to help with pain and improve health.   I have provided the patient with   Home exercise packet  for spine conditioning program to incorporate into her home exercise regimen.  Medications:   - Patient s/c'd  OTC Tylenol prn    -  Reviewed and consistent with medication use as prescribed.  Imaging:  MRI lumbar spine was reviewed and discussed with the patient using spine models.  Follow Up: RTC 1 year sooner for ongoing care.    Jose Owens, NP-C  [facility]      Disclaimer: This note was partly generated using dictation software which may occasionally result in transcription errors.

## 2025-06-22 ENCOUNTER — PATIENT MESSAGE (OUTPATIENT)
Dept: PRIMARY CARE CLINIC | Facility: CLINIC | Age: 80
End: 2025-06-22
Payer: MEDICARE

## 2025-06-23 NOTE — TELEPHONE ENCOUNTER
Pt feels lightheadness and feels weak but it goes away after she drinks water. Lasts a minute. Occurred in the past week and occurred 2-3 days but is not consistent. Happens in afternoon. She doesn't feel lightheaded or weak after taking medications. Pt would like to see if u recommend another cardiology about those symptoms and heart palpations. Appt scheduled for tmr

## 2025-06-23 NOTE — TELEPHONE ENCOUNTER
LOV with Simno Hernandez MD , 4/24/2025 (Annual)  Next cardiology appt 7/9/25  Next available appt with PCP is late July.  Pt reports feeling weak and episodes of light-headedness; she is asking if her medications need to be adjusted.    Please assist pt in scheduling, if possible. No appts available w/o override capability.

## 2025-06-24 ENCOUNTER — OFFICE VISIT (OUTPATIENT)
Dept: PRIMARY CARE CLINIC | Facility: CLINIC | Age: 80
End: 2025-06-24
Payer: MEDICARE

## 2025-06-24 VITALS
HEART RATE: 57 BPM | WEIGHT: 164.88 LBS | DIASTOLIC BLOOD PRESSURE: 72 MMHG | BODY MASS INDEX: 26.5 KG/M2 | HEIGHT: 66 IN | OXYGEN SATURATION: 97 % | SYSTOLIC BLOOD PRESSURE: 128 MMHG

## 2025-06-24 DIAGNOSIS — I47.10 SVT (SUPRAVENTRICULAR TACHYCARDIA): ICD-10-CM

## 2025-06-24 DIAGNOSIS — R00.2 PALPITATIONS: ICD-10-CM

## 2025-06-24 DIAGNOSIS — I48.0 PAROXYSMAL ATRIAL FIBRILLATION: ICD-10-CM

## 2025-06-24 PROCEDURE — 99214 OFFICE O/P EST MOD 30 MIN: CPT | Mod: S$PBB,,, | Performed by: INTERNAL MEDICINE

## 2025-06-24 PROCEDURE — 99999 PR PBB SHADOW E&M-EST. PATIENT-LVL III: CPT | Mod: PBBFAC,,, | Performed by: INTERNAL MEDICINE

## 2025-06-24 PROCEDURE — 99213 OFFICE O/P EST LOW 20 MIN: CPT | Mod: PBBFAC,PN | Performed by: INTERNAL MEDICINE

## 2025-06-24 RX ORDER — LOSARTAN POTASSIUM 100 MG/1
100 TABLET ORAL DAILY
Qty: 90 TABLET | Refills: 3 | Status: SHIPPED | OUTPATIENT
Start: 2025-06-24

## 2025-06-24 RX ORDER — ROSUVASTATIN CALCIUM 10 MG/1
10 TABLET, COATED ORAL DAILY
Qty: 90 TABLET | Refills: 3 | Status: SHIPPED | OUTPATIENT
Start: 2025-06-24

## 2025-06-24 RX ORDER — FLECAINIDE ACETATE 100 MG/1
100 TABLET ORAL 2 TIMES DAILY
Qty: 180 TABLET | Refills: 3 | Status: SHIPPED | OUTPATIENT
Start: 2025-06-24

## 2025-06-24 RX ORDER — DABIGATRAN ETEXILATE 150 MG/1
150 CAPSULE ORAL 2 TIMES DAILY
Qty: 180 CAPSULE | Refills: 3 | Status: SHIPPED | OUTPATIENT
Start: 2025-06-24

## 2025-06-24 RX ORDER — METOPROLOL SUCCINATE 100 MG/1
100 TABLET, EXTENDED RELEASE ORAL 2 TIMES DAILY
Qty: 180 TABLET | Refills: 3 | Status: SHIPPED | OUTPATIENT
Start: 2025-06-24

## 2025-06-24 RX ORDER — LEVOTHYROXINE SODIUM 75 UG/1
75 TABLET ORAL DAILY
Qty: 90 TABLET | Refills: 3 | Status: SHIPPED | OUTPATIENT
Start: 2025-06-24

## 2025-06-24 NOTE — PROGRESS NOTES
[facility] Progress Note  6/24/2025          Reason for Visit:      had concerns including Follow-up (fatigue).       History of Present Illness:       Patient presents today for episodes of dizziness.    Dizziness  She reports 2-3 episodes of dizziness over the last week, primarily occurring mid-afternoon. Episodes lasted a few minutes and resolved with water intake. Associated symptoms included feeling heavy and extremely fatigued. During one episode, she had to stop walking outside. She emphasizes these symptoms are new and unusual. The episodes have not recurred since the initial occurrences, which she attributes to possible inadequate hydration.    SVT / Afib  Pradaxa  Flecainide  Metoprolol  She has a history of atrial fibrillation with previous episodes of heart racing, which have significantly decreased over the past 1-2 years since medication changes. She denies recent episodes of heart racing and reports stable heart rhythm. Her cardiac symptoms are effectively controlled with current medication regimen.    Hypothyroidism  Levothyroxine 75 mcg daily  April laboratory studies showed normal thyroid function and electrolytes. No evidence of anemia.           Problem List:     Problem List[1]      Medications:     Current Medications[2]      Review of Systems:     Review of Systems   Constitutional:  Negative for chills and fever.   HENT:  Negative for ear pain and sore throat.    Respiratory:  Negative for cough, shortness of breath and wheezing.    Cardiovascular:  Negative for chest pain and palpitations.   Gastrointestinal:  Negative for constipation, diarrhea, nausea and vomiting.   Genitourinary:  Negative for dysuria and hematuria.   Musculoskeletal:  Negative for joint swelling and joint deformity.   Neurological:  Negative for dizziness and weakness.         Physical Exam:     Temp:             Blood Pressure:  128/72        Pulse:   (!) 57     Respirations:     Weight:   74.8 kg  "(164 lb 14.5 oz)  Height:   5' 6" (1.676 m)  BMI:     Body mass index is 26.62 kg/m².    Physical Exam  Constitutional:       General: She is not in acute distress.  HENT:      Right Ear: Tympanic membrane normal.      Left Ear: Tympanic membrane normal.      Nose: No congestion or rhinorrhea.      Mouth/Throat:      Pharynx: No oropharyngeal exudate or posterior oropharyngeal erythema.   Cardiovascular:      Rate and Rhythm: Normal rate and regular rhythm.   Pulmonary:      Effort: Pulmonary effort is normal. No respiratory distress.      Breath sounds: No wheezing.   Abdominal:      Palpations: Abdomen is soft.      Tenderness: There is no abdominal tenderness.   Skin:     General: Skin is warm.   Neurological:      General: No focal deficit present.      Mental Status: She is alert and oriented to person, place, and time.       Labs/Imaging/Testing:     Most recent CBC:  Lab Results   Component Value Date    WBC 7.58 04/28/2025    HGB 15.3 04/28/2025     04/28/2025    MCV 83 04/28/2025       Most recent Lipids:  Lab Results   Component Value Date    CHOL 142 04/28/2025    HDL 40 04/28/2025    LDLCALC 85.6 04/28/2025    TRIG 82 04/28/2025       Most recent Chemistry:  Lab Results   Component Value Date     04/28/2025    K 4.7 04/28/2025     04/28/2025    CO2 24 04/28/2025    BUN 18 04/28/2025    CREATININE 1.0 04/28/2025     04/28/2025    CALCIUM 9.4 04/28/2025    ALT 16 04/28/2025    AST 22 04/28/2025    ALKPHOS 90 04/28/2025    PROT 7.0 04/28/2025    ALBUMIN 3.7 04/28/2025       Other tests:  Lab Results   Component Value Date    TSH 1.799 04/28/2025    FREET4 1.39 04/28/2025    INR 1.3 (H) 08/14/2024    HGBA1C 6.1 (H) 04/28/2025    [id number] 42 04/28/2025       Assessment and Plan:     Visit Summary:  Assessed episodes of dizziness and fatigue, likely due to dehydration given improvement with water intake.  Ruled out anemia, electrolyte imbalance, and thyroid dysfunction based on " normal April lab results.  Evaluated recent knee surgery as potential contributing factor to symptoms, but deemed unlikely.  Discussed a-fib history and current management with medications (Flecainide, Metoprolol).  Determined ablation procedure not urgently needed given good symptom control with current medication regimen.  Recommend monitoring BP during symptomatic episodes to assess for hypotension.    - Continue Flecainide and Metoprolol for a-fib management  - Increase water intake throughout the day  - Rest knee, prop up, and apply ice to reduce swelling  - Check BP during symptomatic episodes  - Repeat labs in October  - Follow up with Dr. Post to discuss ongoing management and potential need for ablation       1. Paroxysmal atrial fibrillation  - flecainide (TAMBOCOR) 100 MG Tab; Take 1 tablet (100 mg total) by mouth 2 (two) times a day.  Dispense: 180 tablet; Refill: 3  - Provided simplified explanation of a-fib and ablation procedure, including potential risks and benefits.  - Patient denies any episodes of rapid ventricular response or heart racing in the past 1-2 years since medication change.  - Continuing Flecainide and Metoprolol for a-fib management with good symptom control.  - Maintained appointment with Dr. Post to discuss ongoing management and potential need for ablation, though patient currently prefers to avoid the procedure due to controlled symptoms with medication.    2. SVT (supraventricular tachycardia)  - metoprolol succinate (TOPROL-XL) 100 MG 24 hr tablet; Take 1 tablet (100 mg total) by mouth 2 (two) times daily.  Dispense: 180 tablet; Refill: 3    3. Palpitations  - metoprolol succinate (TOPROL-XL) 100 MG 24 hr tablet; Take 1 tablet (100 mg total) by mouth 2 (two) times daily.  Dispense: 180 tablet; Refill: 3      - Patient experienced episodes of dizziness, heaviness, and fatigue which resolved after hydration.  - Assessed these episodes as likely due to dehydration and possibly  "a drop in blood pressure.  - Explained physiological response of increased heart rate to compensate for BP drops, noting medications may inhibit this natural response.  - Instructed patient to check BP during symptomatic episodes and contact office if experiencing more issues with dizziness or if BP is found to be low.  - Recommend continuing efforts to increase water intake throughout the day.    - Follow up for repeat labs in October.          Simon Hernandez MD  6/24/2025    This note was prepared using voice-recognition software.  Although efforts are made to proofread the note, some errors may persist in the final document.         [1]   Patient Active Problem List  Diagnosis    Hypothyroidism    HTN (hypertension)    Dyslipidemia    SVT (supraventricular tachycardia)    Impaired functional mobility, balance, gait, and endurance    DDD (degenerative disc disease), lumbar    Spondylosis of lumbar region without myelopathy or radiculopathy    Lumbar strain, initial encounter    Senile purpura    Paroxysmal atrial fibrillation    Nonexudative age-related macular degeneration, bilateral, intermediate dry stage    Posterior vitreous detachment, bilateral    Epiretinal membrane, right    Vitelliform lesion of macula    NS (nuclear sclerosis), bilateral    Nuclear sclerotic cataract of right eye    Aortic atherosclerosis    Upper respiratory tract infection    Wheezing   [2]   Current Outpatient Medications:     calcium carbonate-vitamin D3 (CALTRATE 600 PLUS D) 600 mg-20 mcg (800 unit) Chew, Take 1 tablet by mouth once daily., Disp: 30 tablet, Rfl: 11    Lactobacillus rhamnosus GG (CULTURELLE) 10 billion cell capsule, Take 1 capsule by mouth once daily., Disp: 30 capsule, Rfl: 3    vit C/E/Zn/coppr/lutein/zeaxan (PRESERVISION AREDS-2 ORAL), Take by mouth 2 (two) times a day., Disp: , Rfl:     dabigatran etexilate (PRADAXA) 150 mg Cap, Take 1 capsule (150 mg total) by mouth 2 (two) times a day. "Do NOT break, chew, " "or open capsules.", Disp: 180 capsule, Rfl: 3    flecainide (TAMBOCOR) 100 MG Tab, Take 1 tablet (100 mg total) by mouth 2 (two) times a day., Disp: 180 tablet, Rfl: 3    levothyroxine (SYNTHROID) 75 MCG tablet, Take 1 tablet (75 mcg total) by mouth once daily., Disp: 90 tablet, Rfl: 3    losartan (COZAAR) 100 MG tablet, Take 1 tablet (100 mg total) by mouth once daily., Disp: 90 tablet, Rfl: 3    metoprolol succinate (TOPROL-XL) 100 MG 24 hr tablet, Take 1 tablet (100 mg total) by mouth 2 (two) times daily., Disp: 180 tablet, Rfl: 3    rosuvastatin (CRESTOR) 10 MG tablet, Take 1 tablet (10 mg total) by mouth once daily., Disp: 90 tablet, Rfl: 3  No current facility-administered medications for this visit.    Facility-Administered Medications Ordered in Other Visits:     fentaNYL 50 mcg/mL injection 25 mcg, 25 mcg, Intravenous, Q5 Min PRN, Alley Rogers MD, 25 mcg at 05/08/24 1055    midazolam injection 2 mg, 2 mg, Intravenous, PRN, Alley Rogers MD, 2 mg at 05/08/24 1044    phenylephrine HCL 10% ophthalmic solution 1 drop, 1 drop, Right Eye, PRN, Alley Rogers MD, 1 drop at 05/08/24 1026    "

## 2025-07-01 DIAGNOSIS — I48.0 PAROXYSMAL ATRIAL FIBRILLATION: ICD-10-CM

## 2025-07-01 DIAGNOSIS — I47.10 SVT (SUPRAVENTRICULAR TACHYCARDIA): Primary | ICD-10-CM

## 2025-07-09 ENCOUNTER — HOSPITAL ENCOUNTER (OUTPATIENT)
Dept: CARDIOLOGY | Facility: CLINIC | Age: 80
Discharge: HOME | End: 2025-07-09
Payer: MEDICARE

## 2025-07-09 ENCOUNTER — OFFICE VISIT (OUTPATIENT)
Dept: ELECTROPHYSIOLOGY | Facility: CLINIC | Age: 80
End: 2025-07-09
Payer: MEDICARE

## 2025-07-09 VITALS
WEIGHT: 163.81 LBS | SYSTOLIC BLOOD PRESSURE: 130 MMHG | HEIGHT: 66 IN | BODY MASS INDEX: 26.33 KG/M2 | DIASTOLIC BLOOD PRESSURE: 74 MMHG | HEART RATE: 55 BPM

## 2025-07-09 DIAGNOSIS — I48.0 PAROXYSMAL ATRIAL FIBRILLATION: ICD-10-CM

## 2025-07-09 DIAGNOSIS — I47.10 SVT (SUPRAVENTRICULAR TACHYCARDIA): ICD-10-CM

## 2025-07-09 DIAGNOSIS — I48.0 PAROXYSMAL ATRIAL FIBRILLATION: Primary | ICD-10-CM

## 2025-07-09 DIAGNOSIS — I10 PRIMARY HYPERTENSION: ICD-10-CM

## 2025-07-09 DIAGNOSIS — R00.2 PALPITATIONS: ICD-10-CM

## 2025-07-09 LAB
OHS QRS DURATION: 92 MS
OHS QTC CALCULATION: 401 MS

## 2025-07-09 PROCEDURE — 99213 OFFICE O/P EST LOW 20 MIN: CPT | Mod: S$PBB,,, | Performed by: INTERNAL MEDICINE

## 2025-07-09 PROCEDURE — 99999 PR PBB SHADOW E&M-EST. PATIENT-LVL III: CPT | Mod: PBBFAC,,, | Performed by: INTERNAL MEDICINE

## 2025-07-09 PROCEDURE — 93005 ELECTROCARDIOGRAM TRACING: CPT | Mod: PBBFAC | Performed by: INTERNAL MEDICINE

## 2025-07-09 PROCEDURE — 93010 ELECTROCARDIOGRAM REPORT: CPT | Mod: S$PBB,,, | Performed by: INTERNAL MEDICINE

## 2025-07-09 PROCEDURE — 99213 OFFICE O/P EST LOW 20 MIN: CPT | Mod: PBBFAC | Performed by: INTERNAL MEDICINE

## 2025-07-09 NOTE — PROGRESS NOTES
"Subjective:   Patient ID:  Anette Willis is a 80 y.o. female who presents for evaluation of Atrial Fibrillation    Primary Cardiologist: Tavo Damon Jr, MD  Primary Care Physician: Simon Hernandez MD    HPI  Prior Hx:  I had the pleasure of seeing Mrs. Willis today in our electrophysiology clinic in follow-up for her atrial fibrillation. As you are aware she is a pleasant 80 year-old woman with hypertension and SVT. She has a long history of palpitations, previously determined to be SVT. She was treated with metoprolol by her cardiologist when she lived in [place]. She saw Dr. Dubois to establish care after moving to [place]. A 30 day event monitor was ordered. She was observed to have an episode of AF with RVR for which she had been referred and seen in 2/2024. She was started on eliquis. No ECGs from [place] of SVT. Eliquis is very unaffordable. She paid $800 for a 90 day supply. Suspect "SVT" is a paroxysmal atrial tachycardia. Discussed drug therapy versus ablation. She preferred to start with an anti-arrhythmic drug. She has a structurally normal heart and no symptoms of ischemic heart disease. Will start flecainide 100mg bid. Requested she check with her drug insurance company if dabigatran is covered. If not recommend switching to warfarin at the end of her 90 days of eliquis    ECHO 2/2024: normal LVEF, normal LA size    4/2024: Mrs. Willis returned for follow-up. Feels great. Concerned with her resting heart rate in the 50s on her meds. This is asymptomatic. She has not called her insurance yet about Dabigatran coverage. Suspect "SVT" is a paroxysmal atrial tachycardia. Discussed drug therapy versus ablation. She preferred to start AAD therapy. She is on flecainide/metoprolol. This is working well for her. Will lower metoprolol succinate to 100mg once daily. Recommended again to contact her insurance regarding coverage of Dabigatran. Should this be unaffordable would recommend transition to warfarin " with goal INR of 2-3 once she runs out of eliquis.    6/2024: Mrs. Willis returned for follow-up. She has had recurrence of AF with associated weakness. Saw Dr. Damon in May 2024 and was in AF. In sinus rhythm today. After discussion she elected for PVI.    11/2024: Mrs. Willis cancelled her previously scheduled ablation in August. She presented to discuss. She rescheduled for December however presents to discuss again. She indicates that she has been more in normal rhythm since switching to dabigatran. We have discussed what dabigatran does, which has no role in rhythm control. She continues on flecainide/metoprolol. She reports no AF since May 2024.    Interim Hx:  Mrs Willis returns for follow-up. Feels well. No symptomatic AF since our last visit. No bleeding issues on pradaxa.    My interpretation of today's in-clinic ECG is sinus bradycardia with a rate of 55 bpm and narrow QRS    Review of Systems   Constitutional: Negative for fever and malaise/fatigue.   HENT:  Negative for congestion and sore throat.    Eyes:  Negative for blurred vision and visual disturbance.   Cardiovascular:  Negative for chest pain, dyspnea on exertion, irregular heartbeat, near-syncope, palpitations and syncope.   Respiratory:  Negative for cough and shortness of breath.    Hematologic/Lymphatic: Negative for bleeding problem. Does not bruise/bleed easily.   Skin: Negative.    Musculoskeletal:  Positive for arthritis and joint pain.   Gastrointestinal:  Negative for bloating, abdominal pain, hematochezia and melena.   Neurological:  Negative for focal weakness and weakness.   Psychiatric/Behavioral: Negative.         Objective:   Physical Exam  Vitals reviewed.   Constitutional:       General: She is not in acute distress.     Appearance: She is well-developed. She is not diaphoretic.   HENT:      Head: Normocephalic and atraumatic.   Eyes:      General:         Right eye: No discharge.         Left eye: No discharge.       Conjunctiva/sclera: Conjunctivae normal.   Cardiovascular:      Rate and Rhythm: Regular rhythm. Bradycardia present.      Heart sounds: No murmur heard.     No friction rub. No gallop.   Pulmonary:      Effort: Pulmonary effort is normal. No respiratory distress.      Breath sounds: Normal breath sounds. No wheezing or rales.   Abdominal:      General: Bowel sounds are normal. There is no distension.      Palpations: Abdomen is soft.      Tenderness: There is no abdominal tenderness.   Musculoskeletal:      Cervical back: Neck supple.   Skin:     General: Skin is warm and dry.   Neurological:      Mental Status: She is alert and oriented to person, place, and time.   Psychiatric:         Behavior: Behavior normal.         Thought Content: Thought content normal.         Judgment: Judgment normal.         Assessment:      1. Paroxysmal atrial fibrillation    2. SVT (supraventricular tachycardia)    3. Primary hypertension    4. Palpitations        Plan:     In summary, Mrs. Willis is a pleasant 80 year-old woman with hypertension and reported SVT presenting for evaluation for symptomatic paroxysmal AF. She is having second thoughts regarding PVI (previously cancelled then re-scheduled). Discussed Pradaxa is not an anti-arrhythmic drug and it is a coincidence that her burden has decreased since changing from eliquis to pradaxa. Discussed flecainide is the anti-arrhythmic drug that is keeping her in sinus rhythm, with which she has already shown to have breakthrough. Discussed PVI versus alternative AAD therapy versus maintaining as is on flecainide. She elects to continue as is for now but would consider PVI again should her burden increase.     RTC in 6 months    Thank you for allowing me to participate in the care of this patient. Please do not hesitate to call me with any questions or concerns.    Blade Post MD, PhD  Cardiac Electrophysiology

## (undated) DEVICE — GLOVE BIOGEL ECLIPSE SZ 6.5

## (undated) DEVICE — Device

## (undated) DEVICE — DRAPE OPHTHALMIC 48X62 FEN

## (undated) DEVICE — DUOVISC

## (undated) DEVICE — SYR LUER LOCK 1CC

## (undated) DEVICE — SOL BETADINE 5%

## (undated) DEVICE — DRESSING TRANS 2X2 TEGADERM